# Patient Record
Sex: FEMALE | Race: WHITE | NOT HISPANIC OR LATINO | Employment: OTHER | URBAN - METROPOLITAN AREA
[De-identification: names, ages, dates, MRNs, and addresses within clinical notes are randomized per-mention and may not be internally consistent; named-entity substitution may affect disease eponyms.]

---

## 2017-01-05 ENCOUNTER — ANTI-COAG VISIT (OUTPATIENT)
Dept: CARDIOLOGY | Facility: CLINIC | Age: 72
End: 2017-01-05
Payer: MEDICARE

## 2017-01-05 DIAGNOSIS — Z79.01 LONG TERM CURRENT USE OF ANTICOAGULANT THERAPY: Primary | ICD-10-CM

## 2017-01-05 DIAGNOSIS — I48.91 ATRIAL FIBRILLATION, UNSPECIFIED TYPE: ICD-10-CM

## 2017-01-05 LAB
CTP QC/QA: NORMAL
INR PPP: 2.2 (ref 2–3)

## 2017-01-05 PROCEDURE — 85610 PROTHROMBIN TIME: CPT | Mod: QW,S$GLB,,

## 2017-01-05 NOTE — PROGRESS NOTES
INR in range on adjusted dose. No changes/problems reported. Will maintain and recheck in 3 weeks.

## 2017-01-05 NOTE — MR AVS SNAPSHOT
[facility]  [address]  [place] [address]  Phone: [phone]                  Eva Corona   2017 2:00 PM   Anti-coag visit    Description:  Female : 1945   Provider:  Betsy Quesada, KenD   Department:  [facility]           Diagnoses this Visit        Comments    Long term current use of anticoagulant therapy    -  Primary     Atrial fibrillation, unspecified type                To Do List           Future Appointments        Provider Department Dept Phone    2017 3:00 PM JAYY REYES 2 [place] [facility] [phone]    2017 3:00 PM [facility] [phone]    2017 3:00 PM Beronica Ivory, PharmD [facility] [phone]      Goals (5 Years of Data)     None      [facility]     [facility] -  Assistance  Registered nurses in the [facility] provide clinical advisement, health education, appointment booking, and other advisory services.  Call for this free service at [phone].             Medications           Message regarding Medications     Verify the changes and/or additions to your medication regime listed below are the same as discussed with your clinician today.  If any of these changes or additions are incorrect, please notify your healthcare provider.             Verify that the below list of medications is an accurate representation of the medications you are currently taking.  If none reported, the list may be blank. If incorrect, please contact your healthcare provider. Carry this list with you in case of emergency.           Current Medications     acetaminophen (TYLENOL) 325 MG tablet Take 2 tablets (650 mg total) by mouth every 6 (six) hours as needed for Pain.    amlodipine (NORVASC) 5 MG tablet Take 1 tablet (5 mg total) by mouth once daily.    amoxicillin (AMOXIL) 500 MG Tab Take 1 tablet (500 mg total) by mouth every 12  (twelve) hours.    ascorbic acid (VITAMIN C) 1000 MG tablet Take 1,000 mg by mouth once daily.    atorvastatin (LIPITOR) 10 MG tablet Take 10 mg by mouth once daily.    COCONUT OIL ORAL Take by mouth.    fluticasone (FLONASE) 50 mcg/actuation nasal spray 2 sprays by Each Nare route every evening.    furosemide (LASIX) 40 MG tablet Take 40 mg by mouth 2 (two) times daily.    gabapentin (NEURONTIN) 300 MG capsule Take 600 mg by mouth 3 (three) times daily.    glimepiride (AMARYL) 4 MG tablet Take 2 tablets (8 mg total) by mouth daily with breakfast.    lansoprazole (PREVACID SOLUTAB) 30 MG disintegrating tablet Take 30 mg by mouth once daily.    levothyroxine (SYNTHROID) 75 MCG tablet TAKE 1 TABLET BY MOUTH ONCE A DAY BEFORE BREAKFAST    metformin (GLUCOPHAGE) 500 MG tablet Take 1 tablet (500 mg total) by mouth 2 (two) times daily with meals.    metoprolol succinate (TOPROL-XL) 100 MG 24 hr tablet Take 1 tablet (100 mg total) by mouth once daily.    MULTIVIT-MIN/FA/CA CARB/VIT K (ONE-A-DAY WOMEN'S 50+ ORAL) Take 1 tablet by mouth once daily.     potassium chloride SA (K-DUR,KLOR-CON) 20 MEQ tablet Take 1 tablet (20 mEq total) by mouth once daily.    VITAMIN A ORAL Take 1 tablet by mouth once daily.     warfarin (COUMADIN) 5 MG tablet 1.5 tablets (7.5 mg) PO on Mon, Wed, Fri.  1 tablet PO (5 mg) all other days.           Clinical Reference Information           Vital Signs - Last Recorded     LMP                   (LMP Unknown)           Allergies as of 1/5/2017     Codeine    Neuromuscular Blockers, Steroidal    Morphine      Immunizations Administered on Date of Encounter - 1/5/2017     None      Orders Placed During Today's Visit      Normal Orders This Visit    ISTAT INR          1/5/2017  2:09 PM - Betsy Quesada PharmD      Component Results     Component Value Flag Ref Range Units Status    INR 2.2  2.0 - 3.0  Final     Acceptable           December 2016 Details    Sun Mon Tue Wed Thu Fri Sat          1               2               3                 4               5               6      5 mg         7      7.5 mg         8      5 mg         9      7.5 mg         10      5 mg           11      5 mg         12      7.5 mg         13   2.3   5 mg   See details      14      7.5 mg         15      5 mg         16      7.5 mg         17      5 mg           18      5 mg         19      7.5 mg         20      5 mg         21      7.5 mg         22      5 mg         23      7.5 mg         24      5 mg           25      5 mg         26      7.5 mg         27      5 mg         28      7.5 mg         29      5 mg         30      7.5 mg         31      5 mg          Date Details   12/13 Last INR check   INR: 2.3                 January 2017 Details    Sun Mon Tue Wed Thu Fri Sat     1      5 mg         2      7.5 mg         3      5 mg         4      7.5 mg         5   2.2   5 mg   See details      6      7.5 mg         7      5 mg           8      5 mg         9      7.5 mg         10      5 mg         11      7.5 mg         12      5 mg         13      7.5 mg         14      5 mg           15      5 mg         16      7.5 mg         17      5 mg         18      7.5 mg         19      5 mg         20      7.5 mg         21      5 mg           22      5 mg         23      7.5 mg         24      5 mg         25      7.5 mg         26      5 mg         27      7.5 mg         28      5 mg           29      5 mg         30      7.5 mg         31                 Date Details   01/05 This INR check   INR: 2.2       Date of next INR:  1/31/2017               How to take your warfarin dose     To take:  5 mg Take 1 of the 5 mg tablets.    To take:  7.5 mg Take 1.5 of the 5 mg tablets.           Anticoagulation Summary as of 1/5/2017     Maintenance plan 7.5 mg (5 mg x 1.5) on Mon, Wed, Fri; 5 mg (5 mg x 1) all other days    Full instructions 7.5 mg on Mon, Wed, Fri; 5 mg all other days    Next INR check 1/31/2017       Anticoagulation Episode Summary     Comments [facility]

## 2017-01-11 ENCOUNTER — DOCUMENTATION ONLY (OUTPATIENT)
Dept: FAMILY MEDICINE | Facility: CLINIC | Age: 72
End: 2017-01-11

## 2017-01-11 NOTE — PROGRESS NOTES
Pre-Visit Chart Review  For Appointment Scheduled on 01/12/2017    Health Maintenance Due   Topic Date Due    Foot Exam  09/04/1955    TETANUS VACCINE  09/04/1963    Pneumococcal (65+) (1 of 2 - PCV13) 09/04/2010    Eye Exam  05/19/2015    Mammogram  03/03/2016    Influenza Vaccine  08/01/2016    Urine Microalbumin  10/12/2016

## 2017-01-26 ENCOUNTER — OFFICE VISIT (OUTPATIENT)
Dept: CARDIOLOGY | Facility: CLINIC | Age: 72
End: 2017-01-26
Payer: MEDICARE

## 2017-01-26 VITALS
SYSTOLIC BLOOD PRESSURE: 155 MMHG | OXYGEN SATURATION: 94 % | BODY MASS INDEX: 31.47 KG/M2 | DIASTOLIC BLOOD PRESSURE: 67 MMHG | HEART RATE: 73 BPM | HEIGHT: 64 IN | WEIGHT: 184.31 LBS

## 2017-01-26 DIAGNOSIS — Z86.79 PERSONAL HISTORY OF ATRIAL FIBRILLATION: ICD-10-CM

## 2017-01-26 DIAGNOSIS — E78.2 MIXED HYPERLIPIDEMIA: ICD-10-CM

## 2017-01-26 DIAGNOSIS — I42.9 CARDIOMYOPATHY, UNSPECIFIED TYPE: ICD-10-CM

## 2017-01-26 DIAGNOSIS — Z86.79 S/P ABLATION OF ATRIAL FIBRILLATION: ICD-10-CM

## 2017-01-26 DIAGNOSIS — Z98.890 S/P ABLATION OF ATRIAL FIBRILLATION: ICD-10-CM

## 2017-01-26 DIAGNOSIS — E66.9 OBESITY, CLASS I, BMI 30-34.9: ICD-10-CM

## 2017-01-26 DIAGNOSIS — I48.19 PERSISTENT ATRIAL FIBRILLATION: ICD-10-CM

## 2017-01-26 DIAGNOSIS — I50.33 ACUTE ON CHRONIC DIASTOLIC HEART FAILURE: ICD-10-CM

## 2017-01-26 DIAGNOSIS — I50.22 CHRONIC SYSTOLIC CONGESTIVE HEART FAILURE: ICD-10-CM

## 2017-01-26 DIAGNOSIS — I48.0 PAROXYSMAL ATRIAL FIBRILLATION: ICD-10-CM

## 2017-01-26 DIAGNOSIS — I10 ESSENTIAL HYPERTENSION: Primary | ICD-10-CM

## 2017-01-26 DIAGNOSIS — Z79.01 LONG TERM CURRENT USE OF ANTICOAGULANT THERAPY: ICD-10-CM

## 2017-01-26 PROCEDURE — 3077F SYST BP >= 140 MM HG: CPT | Mod: S$GLB,,, | Performed by: INTERNAL MEDICINE

## 2017-01-26 PROCEDURE — 99214 OFFICE O/P EST MOD 30 MIN: CPT | Mod: S$GLB,,, | Performed by: INTERNAL MEDICINE

## 2017-01-26 PROCEDURE — 3078F DIAST BP <80 MM HG: CPT | Mod: S$GLB,,, | Performed by: INTERNAL MEDICINE

## 2017-01-26 PROCEDURE — 1157F ADVNC CARE PLAN IN RCRD: CPT | Mod: S$GLB,,, | Performed by: INTERNAL MEDICINE

## 2017-01-26 PROCEDURE — 99499 UNLISTED E&M SERVICE: CPT | Mod: S$GLB,,, | Performed by: INTERNAL MEDICINE

## 2017-01-26 PROCEDURE — 1159F MED LIST DOCD IN RCRD: CPT | Mod: S$GLB,,, | Performed by: INTERNAL MEDICINE

## 2017-01-26 PROCEDURE — 1160F RVW MEDS BY RX/DR IN RCRD: CPT | Mod: S$GLB,,, | Performed by: INTERNAL MEDICINE

## 2017-01-26 PROCEDURE — 1126F AMNT PAIN NOTED NONE PRSNT: CPT | Mod: S$GLB,,, | Performed by: INTERNAL MEDICINE

## 2017-01-26 PROCEDURE — 99999 PR PBB SHADOW E&M-EST. PATIENT-LVL III: CPT | Mod: PBBFAC,,, | Performed by: INTERNAL MEDICINE

## 2017-01-26 NOTE — MR AVS SNAPSHOT
[facility] - Cardiology  [address]  [place] [address]  Phone: [phone]                  Eva Corona   2017 3:00 PM   Office Visit    Description:  Female : 1945   Provider:  Justus Cleveland MD   Department:  [facility]           Reason for Visit     Hypertension           Diagnoses this Visit        Comments    Essential hypertension    -  Primary     Acute on chronic diastolic heart failure         Cardiomyopathy, unspecified type         Chronic systolic congestive heart failure         Persistent atrial fibrillation                To Do List           Future Appointments        Provider Department Dept Phone    2017 3:00 PM Beronica Ivory, PharmD [facility] - Coumadin [phone]    2017 3:00 PM [facility] [phone]      Goals (5 Years of Data)     None      Follow-Up and Disposition     Return in about 3 months (around 2017).      [facility]     [facility] On Call Nurse Care Line -  Assistance  Registered nurses in the [facility] provide clinical advisement, health education, appointment booking, and other advisory services.  Call for this free service at [phone].             Medications           Message regarding Medications     Verify the changes and/or additions to your medication regime listed below are the same as discussed with your clinician today.  If any of these changes or additions are incorrect, please notify your healthcare provider.             Verify that the below list of medications is an accurate representation of the medications you are currently taking.  If none reported, the list may be blank. If incorrect, please contact your healthcare provider. Carry this list with you in case of emergency.           Current Medications     acetaminophen (TYLENOL) 325 MG tablet Take 2 tablets (650 mg total) by mouth every 6 (six) hours as needed for Pain.     "amlodipine (NORVASC) 5 MG tablet Take 1 tablet (5 mg total) by mouth once daily.    ascorbic acid (VITAMIN C) 1000 MG tablet Take 1,000 mg by mouth once daily.    atorvastatin (LIPITOR) 10 MG tablet Take 10 mg by mouth once daily.    COCONUT OIL ORAL Take by mouth.    fluticasone (FLONASE) 50 mcg/actuation nasal spray 2 sprays by Each Nare route every evening.    furosemide (LASIX) 40 MG tablet Take 40 mg by mouth 2 (two) times daily.    gabapentin (NEURONTIN) 300 MG capsule Take 600 mg by mouth 3 (three) times daily.    glimepiride (AMARYL) 4 MG tablet Take 2 tablets (8 mg total) by mouth daily with breakfast.    lansoprazole (PREVACID SOLUTAB) 30 MG disintegrating tablet Take 30 mg by mouth once daily.    levothyroxine (SYNTHROID) 75 MCG tablet TAKE 1 TABLET BY MOUTH ONCE A DAY BEFORE BREAKFAST    metformin (GLUCOPHAGE) 500 MG tablet Take 1 tablet (500 mg total) by mouth 2 (two) times daily with meals.    metoprolol succinate (TOPROL-XL) 100 MG 24 hr tablet Take 1 tablet (100 mg total) by mouth once daily.    MULTIVIT-MIN/FA/CA CARB/VIT K (ONE-A-DAY WOMEN'S 50+ ORAL) Take 1 tablet by mouth once daily.     potassium chloride SA (K-DUR,KLOR-CON) 20 MEQ tablet Take 1 tablet (20 mEq total) by mouth once daily.    VITAMIN A ORAL Take 1 tablet by mouth once daily.     warfarin (COUMADIN) 5 MG tablet 1.5 tablets (7.5 mg) PO on Mon, Wed, Fri.  1 tablet PO (5 mg) all other days.           Clinical Reference Information           Vital Signs - Last Recorded  Most recent update: 1/26/2017  3:50 PM by Brooke Velázquez MA    BP Pulse Ht Wt LMP SpO2    (!) 155/67 (BP Location: Left arm) 73 5' 4" (1.626 m) 83.6 kg (184 lb 4.9 oz) (LMP Unknown) (!) 94%    BMI                31.64 kg/m2          Blood Pressure          Most Recent Value    Right Arm BP - Sitting  153/68    Left Arm BP - Sitting  155/67    BP  (!)  155/67      Allergies as of 1/26/2017     Codeine    Neuromuscular Blockers, Steroidal    Morphine      Immunizations " Administered on Date of Encounter - 1/26/2017     None      Instructions    Continue current medication regimen  Follow up in 3 months

## 2017-01-26 NOTE — PROGRESS NOTES
[facility]    CC: Follow up    Patient ID: Eva Corona is a 71 y.o. female with a past medical history of DM2, Afib s/p ablation (on coumadin), Non-ischemic cardiomyopathy (EF 35-40%), HTN, HLD, who presents for a follow up appointment.  Pertinent history/events include:      -She was unable to tolerate the increase in Toprol XL to 100 mg daily due to feeling weak and tired.  She went back to 50 mg daily, and now feels better on this dosage.  -Increased Toprol XL to 100 mg daily on 11/10/2016 for improved blood pressure control.  -Underwent Afib ablation on 6/18/2015  -Underwent cardiac catherization on 6/3/2015: RHC showed increased right heart pressures: RV:60/14, PAP:63/25. Coronary angiogram revealed normal coronaries and normal LV function.  -Echo from 11/30/2015 shows EF:35-40% (unchanged from 6/19/2015); Mildly to moderately enlarged aortic root.   -Admitted with PAF with RVR and CHF on 1/30/14- underwent successful electrical cardioversion and was placed on sotalol.    Last clinic visit 12/9/2016:  Assessment/Plan:   Eva Corona is a 71 y.o. female with a past medical history of DM2, Afib s/p ablation (on coumadin), Non-ischemic cardiomyopathy (EF 35-40%), HTN, HLD, who presents for a follow up.  Blood pressure remains elevated.   Unable to tolerate Toprol  mg daily.    1. HTN- Not currently at goal.  Increase Norvasc to 10 mg daily.  Continue Toprol XL 50 mg daily.  Pt 's blood pressure has been difficult to control for several years per chart review.  Will continue to make small changes to her regimen as she tolerates them.   Pt instructed to eat a low salt diet as well.     2. Non-ischemic Cardiomyopathy-  Asymptomatic at this time.  Continue to monitor.    3. PAF s/p ablation- Continue anticoagulation with coumadin.      4. HLD- Continue atorvastatin 10 mg daily.        HPI  Today, Ms. Corona reports doing well.  Recently treated for bronchitis, which is improving.  States blood  pressures at PCP's office and home have been in 130's systolic.  Reports no chest pain, significant SOB, LE edema, palpitations, syncope or TIA symptoms.  States she is eating a low salt diet now and has lost a little weight.  /68 today.  Taking all medications as prescribed with no issues.      Past Medical History   Diagnosis Date    Anticoagulant long-term use     Atrial fibrillation     Cataract     CHF (congestive heart failure)     Diabetes mellitus     Encounter for blood transfusion     Hyperlipidemia     Hypertension     On home oxygen therapy      2L, nightly    Shortness of breath on exertion     Thyroid disease      Past Surgical History   Procedure Laterality Date    Cardiac catheterization Right     Colonoscopy      Cardiac electrophysiology mapping and ablation      Eye surgery Bilateral      cataract, implants    Hysterectomy       TVH, ovaries intact, benign      Review of patient's allergies indicates:   Allergen Reactions    Codeine Nausea And Vomiting    Neuromuscular blockers, steroidal      Other reaction(s): Unknown    Morphine Nausea And Vomiting       Current Outpatient Prescriptions:     acetaminophen (TYLENOL) 325 MG tablet, Take 2 tablets (650 mg total) by mouth every 6 (six) hours as needed for Pain., Disp: , Rfl: 0    amlodipine (NORVASC) 5 MG tablet, Take 1 tablet (5 mg total) by mouth once daily., Disp: 90 tablet, Rfl: 3    ascorbic acid (VITAMIN C) 1000 MG tablet, Take 1,000 mg by mouth once daily., Disp: , Rfl:     atorvastatin (LIPITOR) 10 MG tablet, Take 10 mg by mouth once daily., Disp: , Rfl:     COCONUT OIL ORAL, Take by mouth., Disp: , Rfl:     fluticasone (FLONASE) 50 mcg/actuation nasal spray, 2 sprays by Each Nare route every evening., Disp: 3 Bottle, Rfl: 3    furosemide (LASIX) 40 MG tablet, Take 40 mg by mouth 2 (two) times daily., Disp: , Rfl:     gabapentin (NEURONTIN) 300 MG capsule, Take 600 mg by mouth 3 (three) times daily., Disp: ,  Rfl:     glimepiride (AMARYL) 4 MG tablet, Take 2 tablets (8 mg total) by mouth daily with breakfast., Disp: 180 tablet, Rfl: 3    lansoprazole (PREVACID SOLUTAB) 30 MG disintegrating tablet, Take 30 mg by mouth once daily., Disp: , Rfl:     levothyroxine (SYNTHROID) 75 MCG tablet, TAKE 1 TABLET BY MOUTH ONCE A DAY BEFORE BREAKFAST, Disp: 90 tablet, Rfl: 3    metformin (GLUCOPHAGE) 500 MG tablet, Take 1 tablet (500 mg total) by mouth 2 (two) times daily with meals., Disp: 180 tablet, Rfl: 3    metoprolol succinate (TOPROL-XL) 100 MG 24 hr tablet, Take 1 tablet (100 mg total) by mouth once daily. (Patient taking differently: Take 100 mg by mouth once daily. Take 1/2 tablet PO daily), Disp: 30 tablet, Rfl: 11    MULTIVIT-MIN/FA/CA CARB/VIT K (ONE-A-DAY WOMEN'S 50+ ORAL), Take 1 tablet by mouth once daily. , Disp: , Rfl:     potassium chloride SA (K-DUR,KLOR-CON) 20 MEQ tablet, Take 1 tablet (20 mEq total) by mouth once daily., Disp: 30 tablet, Rfl: 11    VITAMIN A ORAL, Take 1 tablet by mouth once daily. , Disp: , Rfl:     warfarin (COUMADIN) 5 MG tablet, 1.5 tablets (7.5 mg) PO on Mon, Wed, Fri.  1 tablet PO (5 mg) all other days., Disp: 135 tablet, Rfl: 3      Social History     Social History    Marital status:      Spouse name: N/A    Number of children: N/A    Years of education: N/A     Occupational History    Not on file.     Social History Main Topics    Smoking status: Never Smoker    Smokeless tobacco: Never Used    Alcohol use No    Drug use: No    Sexual activity: No     Other Topics Concern    Not on file     Social History Narrative     Family History   Problem Relation Age of Onset    Diabetes Mother     Cancer Neg Hx          Review of Systems  Constitution: Denies chills, fever, and sweats.  HENT: Denies headaches or blurry vision.  Cardiovascular: Denies chest pain or irregular heart beat.  Respiratory: Denies cough or shortness of breath.  Gastrointestinal: Denies  "abdominal pain, nausea, or vomiting.  Musculoskeletal: Denies muscle cramps.  Neurological: Denies dizziness or focal weakness.  Psychiatric/Behavioral: Normal mental status.  Hematologic/Lymphatic: Denies bleeding problem or easy bruising/bleeding.  Skin: Denies rash or suspicious lesions    Physical Examination  Visit Vitals    BP (!) 155/67 (BP Location: Left arm)    Pulse 73    Ht 5' 4" (1.626 m)    Wt 83.6 kg (184 lb 4.9 oz)    LMP  (LMP Unknown)    SpO2 (!) 94%    BMI 31.64 kg/m2       Constitutional: No acute distress, conversant  HEENT: Sclera anicteric, Pupils equal, round and reactive to light, extraocular motions intact, Oropharynx clear  Neck: No JVD, no carotid bruits  Cardiovascular: regular rate and rhythm, no murmur, rubs or gallops, normal S1/S2  Pulmonary: Clear to auscultation bilaterally  Abdominal: Abdomen soft, nontender, nondistended, positive bowel sounds  Extremities: No lower extremity edema,   Pulses:  Carotid pulses are 2+ on the right side, and 2+ on the left side.  Radial pulses are 2+ on the right side, and 2+ on the left side.   Femoral pulses are 2+ on the right side, and 2+ on the left side.  Skin: No ecchymosis, erythema, or ulcers  Psych: Alert and oriented x 3, appropriate affect  Neuro: CNII-XII intact, no focal deficits    Labs:  Results for MUMTAZ MONTALVO (MRN [id number]) as of 12/9/2016 17:27   Ref. Range 12/2/2016 09:38   Cholesterol Latest Ref Range: 120 - 199 mg/dL 112 (L)   HDL Latest Ref Range: 40 - 75 mg/dL 26 (L)   LDL Cholesterol Latest Ref Range: 63.0 - 159.0 mg/dL 57.4 (L)   Total Cholesterol/HDL Ratio Latest Ref Range: 2.0 - 5.0  4.3   Triglycerides Latest Ref Range: 30 - 150 mg/dL 143       Echo 11/30/2015:  CONCLUSIONS     1 - Mildly to moderately enlarged aortic root.     2 - Mild left atrial enlargement.     3 - Mild left ventricular enlargement.     4 - Eccentric hypertrophy.     5 - Moderately depressed left ventricular systolic function (EF 35-40%). "     6 - Low normal to mildly depressed right ventricular systolic function .     7 - No significant change from Echo on 6/19/2015.     Assessment/Plan:   Eva Corona is a 71 y.o. female with a past medical history of DM2, Afib s/p ablation (on coumadin), Non-ischemic cardiomyopathy (EF 35-40%), HTN, HLD, who presents for a follow up.  Blood pressure remains elevated.   Unable to tolerate Toprol  mg daily.    1. HTN- Pt's blood pressures better at home.  She has not been able to tolerate small changes to her regimen.  Will continue current regimen and monitor her home blood pressure readings.  Continue a low salt diet and weight loss.       2. Non-ischemic Cardiomyopathy-  Asymptomatic at this time.  Continue to monitor.    3. PAF s/p ablation- Continue anticoagulation with coumadin.      4. HLD- Continue atorvastatin 10 mg daily.      Follow up in 3 months    Total duration of face to face visit time 45 minutes.  Total time spent counseling greater than fifty percent of total visit time.  Counseling included discussion regarding imaging findings, diagnosis, possibilities, treatment options, risks and benefits.      Justus Cleveland MD, PhD  Interventional Cardiology

## 2017-01-31 NOTE — PROGRESS NOTES
Pt called to resched appt to 2/7--sick with bronchitis; taking doxycycline 100mg BID x 7 days (started yesterday)

## 2017-02-07 ENCOUNTER — ANTI-COAG VISIT (OUTPATIENT)
Dept: CARDIOLOGY | Facility: CLINIC | Age: 72
End: 2017-02-07
Payer: MEDICARE

## 2017-02-07 DIAGNOSIS — I48.91 ATRIAL FIBRILLATION, UNSPECIFIED TYPE: ICD-10-CM

## 2017-02-07 DIAGNOSIS — Z79.01 LONG TERM CURRENT USE OF ANTICOAGULANT THERAPY: Primary | ICD-10-CM

## 2017-02-07 LAB — INR PPP: 1.7 (ref 2–3)

## 2017-02-07 PROCEDURE — 85610 PROTHROMBIN TIME: CPT | Mod: QW,S$GLB,, | Performed by: PHARMACIST

## 2017-02-07 PROCEDURE — 99211 OFF/OP EST MAY X REQ PHY/QHP: CPT | Mod: 25,S$GLB,, | Performed by: PHARMACIST

## 2017-02-07 NOTE — PROGRESS NOTES
"INR subtherapeutic, Pt has another refill of doxy x 7 days, started today.  INR is subtherapeutic with the lowered dose for potential DDI.  Will adjust dose a bit higher for the refill of doxy which is for 7 days.  Pt will then resume her routine dose and recheck in 1 month  "This note will not be shared with the patient."  "

## 2017-02-07 NOTE — MR AVS SNAPSHOT
[facility]  [address]  [place] [address]  Phone: [phone]                  Eva Corona   2017 2:15 PM   Anti-coag visit    Description:  Female : 1945   Provider:  Beronica Ivory PharmD   Department:  [place] [facility] Cou[facility]           Diagnoses this Visit        Comments    Long term current use of anticoagulant therapy                To Do List           Future Appointments        Provider Department Dept Phone    3/7/2017 2:30 PM Fadi Gomezll [facility] - Coumadin [phone]    2017 3:00 PM Justus Cleveland MD [facility] Cardiology [phone]      Goals (5 Years of Data)     None      [facility]     [facility] On Call Nurse [facility] Line -  Assistance  Registered nurses in the [facility] On Call Center provide clinical advisement, health education, appointment booking, and other advisory services.  Call for this free service at [phone].             Medications           Message regarding Medications     Verify the changes and/or additions to your medication regime listed below are the same as discussed with your clinician today.  If any of these changes or additions are incorrect, please notify your healthcare provider.             Verify that the below list of medications is an accurate representation of the medications you are currently taking.  If none reported, the list may be blank. If incorrect, please contact your healthcare provider. Carry this list with you in case of emergency.           Current Medications     acetaminophen (TYLENOL) 325 MG tablet Take 2 tablets (650 mg total) by mouth every 6 (six) hours as needed for Pain.    amlodipine (NORVASC) 5 MG tablet Take 1 tablet (5 mg total) by mouth once daily.    ascorbic acid (VITAMIN C) 1000 MG tablet Take 1,000 mg by mouth once daily.    atorvastatin (LIPITOR) 10 MG tablet Take 10 mg by mouth once daily.    COCONUT OIL ORAL Take by mouth.    fluticasone (FLONASE)  50 mcg/actuation nasal spray 2 sprays by Each Nare route every evening.    furosemide (LASIX) 40 MG tablet Take 40 mg by mouth 2 (two) times daily.    gabapentin (NEURONTIN) 300 MG capsule Take 600 mg by mouth 3 (three) times daily.    glimepiride (AMARYL) 4 MG tablet Take 2 tablets (8 mg total) by mouth daily with breakfast.    lansoprazole (PREVACID SOLUTAB) 30 MG disintegrating tablet Take 30 mg by mouth once daily.    levothyroxine (SYNTHROID) 75 MCG tablet TAKE 1 TABLET BY MOUTH ONCE A DAY BEFORE BREAKFAST    metformin (GLUCOPHAGE) 500 MG tablet Take 1 tablet (500 mg total) by mouth 2 (two) times daily with meals.    metoprolol succinate (TOPROL-XL) 100 MG 24 hr tablet Take 1 tablet (100 mg total) by mouth once daily.    MULTIVIT-MIN/FA/CA CARB/VIT K (ONE-A-DAY WOMEN'S 50+ ORAL) Take 1 tablet by mouth once daily.     potassium chloride SA (K-DUR,KLOR-CON) 20 MEQ tablet Take 1 tablet (20 mEq total) by mouth once daily.    VITAMIN A ORAL Take 1 tablet by mouth once daily.     warfarin (COUMADIN) 5 MG tablet 1.5 tablets (7.5 mg) PO on Mon, Wed, Fri.  1 tablet PO (5 mg) all other days.           Clinical Reference Information           Your Vitals Were     Last Period                   (LMP Unknown)           Allergies as of 2/7/2017     Codeine    Neuromuscular Blockers, Steroidal    Morphine      Immunizations Administered on Date of Encounter - 2/7/2017     None      January 2017 Details    Sun Mon Tue Wed Thu Fri Sat     1               2               3               4               5   2.2   5 mg   See details      6      7.5 mg         7      5 mg           8      5 mg         9      7.5 mg         10      5 mg         11      7.5 mg         12      5 mg         13      7.5 mg         14      5 mg           15      5 mg         16      7.5 mg         17      5 mg         18      7.5 mg         19      5 mg         20      7.5 mg         21      5 mg           22      5 mg         23      7.5 mg         24       5 mg         25      7.5 mg         26      5 mg         27      7.5 mg         28      5 mg           29      5 mg         30      7.5 mg         31      5 mg              Date Details   01/05 Last INR check   INR: 2.2                 February 2017 Details    Sun Mon Tue Wed Thu Fri Sat        1      2.5 mg         2      5 mg         3      5 mg         4      5 mg           5      5 mg         6      2.5 mg         7      5 mg   See details      8      5 mg         9      5 mg         10      5 mg         11      5 mg           12      5 mg         13      5 mg         14      5 mg         15      7.5 mg         16      5 mg         17      7.5 mg         18      5 mg           19      5 mg         20      7.5 mg         21      5 mg         22      7.5 mg         23      5 mg         24      7.5 mg         25      5 mg           26      5 mg         27      7.5 mg         28      5 mg              Date Details   02/07 This INR check               How to take your warfarin dose     To take:  5 mg Take 1 of the 5 mg tablets.    To take:  7.5 mg Take 1.5 of the 5 mg tablets.           March 2017 Details    Sun Mon Tue Wed Thu Fri Sat        1      7.5 mg         2      5 mg         3      7.5 mg         4      5 mg           5      5 mg         6      7.5 mg         7            8               9               10               11                 12               13               14               15               16               17               18                 19               20               21               22               23               24               25                 26               27               28               29               30               31                 Date Details   No additional details    Date of next INR:  3/7/2017         How to take your warfarin dose     To take:  5 mg Take 1 of the 5 mg tablets.    To take:  7.5 mg Take 1.5 of the 5 mg tablets.           Anticoagulation  Summary as of 2/7/2017     Maintenance plan 7.5 mg (5 mg x 1.5) on Mon, Wed, Fri; 5 mg (5 mg x 1) all other days    Full instructions 2/8: 5 mg; 2/10: 5 mg; 2/13: 5 mg; Otherwise 7.5 mg on Mon, Wed, Fri; 5 mg all other days    Next INR check 3/7/2017      Anticoagulation Episode Summary     Comments [facility]      Patient Findings     Positives Change in medications    Negatives Signs/symptoms of thrombosis, Signs/symptoms of bleeding, Laboratory test error suspected, Change in health, Change in alcohol use, Change in activity, Upcoming invasive procedure, Emergency department visit, Upcoming dental procedure, Missed doses, Extra doses, Change in diet/appetite, Hospital admission, Bruising, Other complaints    Comments Pt has another refill of doxy x 7 days, started today      Language Assistance Services     ATTENTION: Language assistance services are available, free of charge. Please call [phone].      ATENCIÓN: Si habla kimberly, tiene a galdamez disposición servicios gratuitos de asistencia lingüística. Llame al [phone].     [facility] Ý: N?u b?n nói Ti?ng Vi?t, có các d?ch v? h? tr? ngôn ng? mi?n phí dành cho b?n. G?i s? [phone].         [facility] [facility] complies with applicable Federal civil rights laws and does not discriminate on the basis of race, color, national origin, age, disability, or sex.

## 2017-02-23 RX ORDER — FUROSEMIDE 40 MG/1
TABLET ORAL
Qty: 180 TABLET | Refills: 3 | Status: SHIPPED | OUTPATIENT
Start: 2017-02-23 | End: 2017-07-25

## 2017-02-23 RX ORDER — ATORVASTATIN CALCIUM 10 MG/1
TABLET, FILM COATED ORAL
Qty: 90 TABLET | Refills: 3 | Status: SHIPPED | OUTPATIENT
Start: 2017-02-23 | End: 2018-03-21 | Stop reason: SDUPTHER

## 2017-02-23 RX ORDER — GABAPENTIN 300 MG/1
CAPSULE ORAL
Qty: 540 CAPSULE | Refills: 3 | Status: SHIPPED | OUTPATIENT
Start: 2017-02-23 | End: 2017-09-28 | Stop reason: DRUGHIGH

## 2017-02-23 RX ORDER — AMLODIPINE BESYLATE 5 MG/1
TABLET ORAL
Qty: 90 TABLET | Refills: 3 | Status: SHIPPED | OUTPATIENT
Start: 2017-02-23 | End: 2017-11-14 | Stop reason: ALTCHOICE

## 2017-03-07 ENCOUNTER — ANTI-COAG VISIT (OUTPATIENT)
Dept: CARDIOLOGY | Facility: CLINIC | Age: 72
End: 2017-03-07
Payer: MEDICARE

## 2017-03-07 DIAGNOSIS — I48.91 ATRIAL FIBRILLATION, UNSPECIFIED TYPE: ICD-10-CM

## 2017-03-07 DIAGNOSIS — Z79.01 LONG TERM CURRENT USE OF ANTICOAGULANT THERAPY: Primary | ICD-10-CM

## 2017-03-07 LAB — INR PPP: 2.1 (ref 2–3)

## 2017-03-07 PROCEDURE — 85610 PROTHROMBIN TIME: CPT | Mod: QW,S$GLB,, | Performed by: PHARMACIST

## 2017-03-07 NOTE — PROGRESS NOTES
INR in range.  Pt denies any changes in meds/diet/health.  Will continue dose and recheck in 5 weeks

## 2017-04-11 ENCOUNTER — ANTI-COAG VISIT (OUTPATIENT)
Dept: CARDIOLOGY | Facility: CLINIC | Age: 72
End: 2017-04-11
Payer: MEDICARE

## 2017-04-11 DIAGNOSIS — Z79.01 LONG TERM CURRENT USE OF ANTICOAGULANT THERAPY: Primary | ICD-10-CM

## 2017-04-11 DIAGNOSIS — I48.91 ATRIAL FIBRILLATION, UNSPECIFIED TYPE: ICD-10-CM

## 2017-04-11 LAB — INR PPP: 2.4 (ref 2–3)

## 2017-04-11 PROCEDURE — 85610 PROTHROMBIN TIME: CPT | Mod: QW,S$GLB,, | Performed by: PHARMACIST

## 2017-04-11 NOTE — PROGRESS NOTES
"INR in range.  Pt denies any changes in meds/diet/health.  Will continue dose and recheck in 4 weeks.  She is t o see Dr Cleveland 4/27, will call with changes.   She plans to inquire about a different anticoagulant. "This note will not be shared with the patient."  "

## 2017-04-11 NOTE — MR AVS SNAPSHOT
[place] [facility]  [address]  [place] [address]  Phone: [phone]                  Eva Corona   2017 2:30 PM   Anti-coag visit    Description:  Female : 1945   Provider:  Beronica Ivory, PharmD   Department:  [place] [facility]           Diagnoses this Visit        Comments    Long term current use of anticoagulant therapy    -  Primary     Atrial fibrillation, unspecified type                To Do List           Future Appointments        Provider Department Dept Phone    2017 3:30 PM [facility] [phone]    2017 2:30 PM Beronica Ivory, Fadi Salcedoll [facility] - Coumadin [phone]      Goals (5 Years of Data)     None      [facility]     [facility] On Call Nurse Care Line -  Assistance  Unless otherwise directed by your provider, please contact [facility], our nurse care line that is available for  assistance.     Registered nurses in the [facility] Center provide: appointment scheduling, clinical advisement, health education, and other advisory services.  Call: [phone] (toll free)               Medications           Message regarding Medications     Verify the changes and/or additions to your medication regime listed below are the same as discussed with your clinician today.  If any of these changes or additions are incorrect, please notify your healthcare provider.             Verify that the below list of medications is an accurate representation of the medications you are currently taking.  If none reported, the list may be blank. If incorrect, please contact your healthcare provider. Carry this list with you in case of emergency.           Current Medications     acetaminophen (TYLENOL) 325 MG tablet Take 2 tablets (650 mg total) by mouth every 6 (six) hours as needed for Pain.    amlodipine (NORVASC) 5 MG tablet TAKE 1 TABLET (5 MG TOTAL) BY MOUTH ONCE DAILY.    ascorbic acid  (VITAMIN C) 1000 MG tablet Take 1,000 mg by mouth once daily.    atorvastatin (LIPITOR) 10 MG tablet TAKE 1 TABLET (10 MG TOTAL) BY MOUTH ONCE DAILY.    COCONUT OIL ORAL Take by mouth.    fluticasone (FLONASE) 50 mcg/actuation nasal spray 2 sprays by Each Nare route every evening.    furosemide (LASIX) 40 MG tablet TAKE 1 TABLET (40 MG TOTAL) BY MOUTH 2 (TWO) TIMES DAILY.    gabapentin (NEURONTIN) 300 MG capsule TAKE 2 CAPSULES (600 MG TOTAL) BY MOUTH 3 (THREE) TIMES DAILY.    glimepiride (AMARYL) 4 MG tablet Take 2 tablets (8 mg total) by mouth daily with breakfast.    lansoprazole (PREVACID SOLUTAB) 30 MG disintegrating tablet Take 30 mg by mouth once daily.    levothyroxine (SYNTHROID) 75 MCG tablet TAKE 1 TABLET BY MOUTH ONCE A DAY BEFORE BREAKFAST    metformin (GLUCOPHAGE) 500 MG tablet Take 1 tablet (500 mg total) by mouth 2 (two) times daily with meals.    metoprolol succinate (TOPROL-XL) 100 MG 24 hr tablet Take 1 tablet (100 mg total) by mouth once daily.    MULTIVIT-MIN/FA/CA CARB/VIT K (ONE-A-DAY WOMEN'S 50+ ORAL) Take 1 tablet by mouth once daily.     potassium chloride SA (K-DUR,KLOR-CON) 20 MEQ tablet Take 1 tablet (20 mEq total) by mouth once daily.    VITAMIN A ORAL Take 1 tablet by mouth once daily.     warfarin (COUMADIN) 5 MG tablet 1.5 tablets (7.5 mg) PO on Mon, Wed, Fri.  1 tablet PO (5 mg) all other days.           Clinical Reference Information           Your Vitals Were     Last Period                   (LMP Unknown)           Allergies as of 4/11/2017     Codeine    Neuromuscular Blockers, Steroidal    Morphine      Immunizations Administered on Date of Encounter - 4/11/2017     None      Orders Placed During Today's Visit      Normal Orders This Visit    POCT INR          4/11/2017  2:56 PM - Beronica Ivory, PharmD      Component Results     Component    INR    2.4      March 2017 Details    Sun Mon Tue Wed Thu Fri Sat        1               2               3               4                  5               6               7   2.1   5 mg   See details      8      7.5 mg         9      5 mg         10      7.5 mg         11      5 mg           12      5 mg         13      7.5 mg         14      5 mg         15      7.5 mg         16      5 mg         17      7.5 mg         18      5 mg           19      5 mg         20      7.5 mg         21      5 mg         22      7.5 mg         23      5 mg         24      7.5 mg         25      5 mg           26      5 mg         27      7.5 mg         28      5 mg         29      7.5 mg         30      5 mg         31      7.5 mg           Date Details   03/07 Last INR check   INR: 2.1                 April 2017 Details    Sun Mon Tue Wed Thu Fri Sat           1      5 mg           2      5 mg         3      7.5 mg         4      5 mg         5      7.5 mg         6      5 mg         7      7.5 mg         8      5 mg           9      5 mg         10      7.5 mg         11   2.4   5 mg   See details      12      7.5 mg         13      5 mg         14      7.5 mg         15      5 mg           16      5 mg         17      7.5 mg         18      5 mg         19      7.5 mg         20      5 mg         21      7.5 mg         22      5 mg           23      5 mg         24      7.5 mg         25      5 mg         26      7.5 mg         27      5 mg         28      7.5 mg         29      5 mg           30      5 mg                Date Details   04/11 This INR check   INR: 2.4                     How to take your warfarin dose     To take:  5 mg Take 1 of the 5 mg tablets.    To take:  7.5 mg Take 1.5 of the 5 mg tablets.           May 2017 Details    Sun Mon Tue Wed Thu Fri Sat      1      7.5 mg         2      5 mg         3      7.5 mg         4      5 mg         5      7.5 mg         6      5 mg           7      5 mg         8      7.5 mg         9            10               11               12               13                 14               15               16                17               18               19               20                 21               22               23               24               25               26               27                 28               29               30               31                   Date Details   No additional details    Date of next INR:  5/9/2017         How to take your warfarin dose     To take:  5 mg Take 1 of the 5 mg tablets.    To take:  7.5 mg Take 1.5 of the 5 mg tablets.           Anticoagulation Summary as of 4/11/2017     Maintenance plan 7.5 mg (5 mg x 1.5) on Mon, Wed, Fri; 5 mg (5 mg x 1) all other days    Full instructions 7.5 mg on Mon, Wed, Fri; 5 mg all other days    Next INR check 5/9/2017      Anticoagulation Episode Summary     Comments [facility]  (po)      Language Assistance Services     ATTENTION: Language assistance services are available, free of charge. Please call [phone].      ATENCIÓN: Si fabiola harris, tiene a galdamez disposición servicios gratuitos de asistencia lingüística. Llame al [phone].     CHÚ Ý: N?u b?n nói Ti?ng Vi?t, có các d?ch v? h? tr? ngôn ng? mi?n phí dành cho b?n. G?i s? [phone].         [place] [facility] complies with applicable Federal civil rights laws and does not discriminate on the basis of race, color, national origin, age, disability, or sex.

## 2017-04-27 ENCOUNTER — OFFICE VISIT (OUTPATIENT)
Dept: CARDIOLOGY | Facility: CLINIC | Age: 72
End: 2017-04-27
Payer: MEDICARE

## 2017-04-27 VITALS — HEIGHT: 64 IN | WEIGHT: 185.88 LBS | OXYGEN SATURATION: 91 % | HEART RATE: 71 BPM | BODY MASS INDEX: 31.73 KG/M2

## 2017-04-27 DIAGNOSIS — Z86.79 PERSONAL HISTORY OF ATRIAL FIBRILLATION: ICD-10-CM

## 2017-04-27 DIAGNOSIS — I10 ESSENTIAL HYPERTENSION: ICD-10-CM

## 2017-04-27 DIAGNOSIS — Z86.79 S/P ABLATION OF ATRIAL FIBRILLATION: ICD-10-CM

## 2017-04-27 DIAGNOSIS — I27.20 PULMONARY HYPERTENSION: Primary | ICD-10-CM

## 2017-04-27 DIAGNOSIS — I48.19 PERSISTENT ATRIAL FIBRILLATION: ICD-10-CM

## 2017-04-27 DIAGNOSIS — I48.0 PAROXYSMAL ATRIAL FIBRILLATION: ICD-10-CM

## 2017-04-27 DIAGNOSIS — E66.9 OBESITY, CLASS I, BMI 30-34.9: ICD-10-CM

## 2017-04-27 DIAGNOSIS — Z79.01 LONG TERM CURRENT USE OF ANTICOAGULANT THERAPY: ICD-10-CM

## 2017-04-27 DIAGNOSIS — I42.9 CARDIOMYOPATHY, UNSPECIFIED TYPE: ICD-10-CM

## 2017-04-27 DIAGNOSIS — I50.22 CHRONIC SYSTOLIC CONGESTIVE HEART FAILURE: ICD-10-CM

## 2017-04-27 DIAGNOSIS — Z98.890 S/P ABLATION OF ATRIAL FIBRILLATION: ICD-10-CM

## 2017-04-27 DIAGNOSIS — E78.2 MIXED HYPERLIPIDEMIA: ICD-10-CM

## 2017-04-27 PROCEDURE — 99214 OFFICE O/P EST MOD 30 MIN: CPT | Mod: S$GLB,,, | Performed by: INTERNAL MEDICINE

## 2017-04-27 PROCEDURE — 99999 PR PBB SHADOW E&M-EST. PATIENT-LVL III: CPT | Mod: PBBFAC,,, | Performed by: INTERNAL MEDICINE

## 2017-04-27 PROCEDURE — 99499 UNLISTED E&M SERVICE: CPT | Mod: S$PBB,,, | Performed by: INTERNAL MEDICINE

## 2017-04-27 PROCEDURE — 1126F AMNT PAIN NOTED NONE PRSNT: CPT | Mod: S$GLB,,, | Performed by: INTERNAL MEDICINE

## 2017-04-27 PROCEDURE — 1160F RVW MEDS BY RX/DR IN RCRD: CPT | Mod: S$GLB,,, | Performed by: INTERNAL MEDICINE

## 2017-04-27 PROCEDURE — 1159F MED LIST DOCD IN RCRD: CPT | Mod: S$GLB,,, | Performed by: INTERNAL MEDICINE

## 2017-04-27 NOTE — PROGRESS NOTES
[facility]    CC: Follow up    Patient ID: Eva Corona is a 71 y.o. female with a past medical history of DM2, Afib s/p ablation (on coumadin), Non-ischemic cardiomyopathy (EF 35-40%), HTN, HLD, who presents for a follow up appointment.  Pertinent history/events include:      -She was unable to tolerate the increase in Toprol XL to 100 mg daily due to feeling weak and tired.  She went back to 50 mg daily, and now feels better on this dosage.  -Increased Toprol XL to 100 mg daily on 11/10/2016 for improved blood pressure control.  -Underwent Afib ablation on 6/18/2015  -Underwent cardiac catherization on 6/3/2015: RHC showed increased right heart pressures: RV:60/14, PAP:63/25. Coronary angiogram revealed normal coronaries and normal LV function.  -Echo from 11/30/2015 shows EF:35-40% (unchanged from 6/19/2015); Mildly to moderately enlarged aortic root.   -Admitted with PAF with RVR and CHF on 1/30/14- underwent successful electrical cardioversion and was placed on sotalol    HPI:  Today, Ms. Corona reports doing well.  Blood pressures have been stable and mainly in 130's at home.  She has no chest pain, significant SOB, LE edema, palpitations, syncope or TIA symptoms.  Taking all medications as prescribed with no issues.  Maintaining a low salt diet.    Past Medical History:   Diagnosis Date    Anticoagulant long-term use     Atrial fibrillation     Cataract     CHF (congestive heart failure)     Diabetes mellitus     Encounter for blood transfusion     Hyperlipidemia     Hypertension     On home oxygen therapy     2L, nightly    Shortness of breath on exertion     Thyroid disease      Past Surgical History:   Procedure Laterality Date    CARDIAC CATHETERIZATION Right     CARDIAC ELECTROPHYSIOLOGY MAPPING AND ABLATION      COLONOSCOPY      EYE SURGERY Bilateral     cataract, implants    HYSTERECTOMY      TVH, ovaries intact, benign      Review of patient's allergies indicates:    Allergen Reactions    Codeine Nausea And Vomiting    Neuromuscular blockers, steroidal      Other reaction(s): Unknown    Morphine Nausea And Vomiting       Current Outpatient Prescriptions:     acetaminophen (TYLENOL) 325 MG tablet, Take 2 tablets (650 mg total) by mouth every 6 (six) hours as needed for Pain., Disp: , Rfl: 0    amlodipine (NORVASC) 5 MG tablet, TAKE 1 TABLET (5 MG TOTAL) BY MOUTH ONCE DAILY., Disp: 90 tablet, Rfl: 3    ascorbic acid (VITAMIN C) 1000 MG tablet, Take 1,000 mg by mouth once daily., Disp: , Rfl:     atorvastatin (LIPITOR) 10 MG tablet, TAKE 1 TABLET (10 MG TOTAL) BY MOUTH ONCE DAILY., Disp: 90 tablet, Rfl: 3    COCONUT OIL ORAL, Take by mouth., Disp: , Rfl:     fluticasone (FLONASE) 50 mcg/actuation nasal spray, 2 sprays by Each Nare route every evening., Disp: 3 Bottle, Rfl: 3    furosemide (LASIX) 40 MG tablet, TAKE 1 TABLET (40 MG TOTAL) BY MOUTH 2 (TWO) TIMES DAILY., Disp: 180 tablet, Rfl: 3    gabapentin (NEURONTIN) 300 MG capsule, TAKE 2 CAPSULES (600 MG TOTAL) BY MOUTH 3 (THREE) TIMES DAILY., Disp: 540 capsule, Rfl: 3    glimepiride (AMARYL) 4 MG tablet, Take 2 tablets (8 mg total) by mouth daily with breakfast., Disp: 180 tablet, Rfl: 3    lansoprazole (PREVACID SOLUTAB) 30 MG disintegrating tablet, Take 30 mg by mouth once daily., Disp: , Rfl:     levothyroxine (SYNTHROID) 75 MCG tablet, TAKE 1 TABLET BY MOUTH ONCE A DAY BEFORE BREAKFAST, Disp: 90 tablet, Rfl: 3    metformin (GLUCOPHAGE) 500 MG tablet, Take 1 tablet (500 mg total) by mouth 2 (two) times daily with meals., Disp: 180 tablet, Rfl: 3    metoprolol succinate (TOPROL-XL) 100 MG 24 hr tablet, Take 1 tablet (100 mg total) by mouth once daily. (Patient taking differently: Take 100 mg by mouth once daily. Take 1/2 tablet PO daily), Disp: 30 tablet, Rfl: 11    MULTIVIT-MIN/FA/CA CARB/VIT K (ONE-A-DAY WOMEN'S 50+ ORAL), Take 1 tablet by mouth once daily. , Disp: , Rfl:     potassium chloride SA  "(K-DUR,KLOR-CON) 20 MEQ tablet, Take 1 tablet (20 mEq total) by mouth once daily., Disp: 30 tablet, Rfl: 11    VITAMIN A ORAL, Take 1 tablet by mouth once daily. , Disp: , Rfl:     warfarin (COUMADIN) 5 MG tablet, 1.5 tablets (7.5 mg) PO on Mon, Wed, Fri.  1 tablet PO (5 mg) all other days., Disp: 135 tablet, Rfl: 3      Social History     Social History    Marital status:      Spouse name: N/A    Number of children: N/A    Years of education: N/A     Occupational History    Not on file.     Social History Main Topics    Smoking status: Never Smoker    Smokeless tobacco: Never Used    Alcohol use No    Drug use: No    Sexual activity: No     Other Topics Concern    Not on file     Social History Narrative     Family History   Problem Relation Age of Onset    Diabetes Mother     Cancer Neg Hx          Review of Systems  Constitution: Denies chills, fever, and sweats.  HENT: Denies headaches or blurry vision.  Cardiovascular: Denies chest pain or irregular heart beat.  Respiratory: Denies cough or shortness of breath.  Gastrointestinal: Denies abdominal pain, nausea, or vomiting.  Musculoskeletal: Denies muscle cramps.  Neurological: Denies dizziness or focal weakness.  Psychiatric/Behavioral: Normal mental status.  Hematologic/Lymphatic: Denies bleeding problem or easy bruising/bleeding.  Skin: Denies rash or suspicious lesions    Physical Examination  Pulse 71  Ht 5' 4" (1.626 m)  Wt 84.3 kg (185 lb 13.6 oz)  LMP  (LMP Unknown)  SpO2 (!) 91%  BMI 31.9 kg/m2    Constitutional: No acute distress, conversant  HEENT: Sclera anicteric, Pupils equal, round and reactive to light, extraocular motions intact, Oropharynx clear  Neck: No JVD, no carotid bruits  Cardiovascular: regular rate and rhythm, no murmur, rubs or gallops, normal S1/S2  Pulmonary: Clear to auscultation bilaterally  Abdominal: Abdomen soft, nontender, nondistended, positive bowel sounds  Extremities: No lower extremity edema, "   Pulses:  Carotid pulses are 2+ on the right side, and 2+ on the left side.  Radial pulses are 2+ on the right side, and 2+ on the left side.   Femoral pulses are 2+ on the right side, and 2+ on the left side.  Skin: No ecchymosis, erythema, or ulcers  Psych: Alert and oriented x 3, appropriate affect  Neuro: CNII-XII intact, no focal deficits    Labs:  Results for MUMTAZ MONTALVO (MRN [id number]) as of 12/9/2016 17:27   Ref. Range 12/2/2016 09:38   Cholesterol Latest Ref Range: 120 - 199 mg/dL 112 (L)   HDL Latest Ref Range: 40 - 75 mg/dL 26 (L)   LDL Cholesterol Latest Ref Range: 63.0 - 159.0 mg/dL 57.4 (L)   Total Cholesterol/HDL Ratio Latest Ref Range: 2.0 - 5.0  4.3   Triglycerides Latest Ref Range: 30 - 150 mg/dL 143       Echo 11/30/2015:  CONCLUSIONS     1 - Mildly to moderately enlarged aortic root.     2 - Mild left atrial enlargement.     3 - Mild left ventricular enlargement.     4 - Eccentric hypertrophy.     5 - Moderately depressed left ventricular systolic function (EF 35-40%).     6 - Low normal to mildly depressed right ventricular systolic function .     7 - No significant change from Echo on 6/19/2015.     Assessment/Plan:   Mumtaz Montalvo is a 71 y.o. female with a past medical history of DM2, Afib s/p ablation (on coumadin), Non-ischemic cardiomyopathy (EF 35-40%), HTN, HLD, who presents for a follow up.     1. HTN- Stable.  She has not been able to tolerate small changes to her regimen.  Continue current regimen.  Continue a low salt diet and weight loss.       2. Non-ischemic Cardiomyopathy-  Asymptomatic at this time and well compensated on exam.  Continue to monitor.  Repeat echo prior to next visit.      3. PAF s/p ablation- Continue anticoagulation with coumadin.      4. HLD- Continue atorvastatin 10 mg daily.      Follow up in 6 months with echo prior    Total duration of face to face visit time 45 minutes.  Total time spent counseling greater than fifty percent of total visit  time.  Counseling included discussion regarding imaging findings, diagnosis, possibilities, treatment options, risks and benefits.      Justus Cleveland MD, PhD  Interventional Cardiology

## 2017-04-27 NOTE — MR AVS SNAPSHOT
[facility]  [address], [address]  [place] [address]  Phone: [phone]                  Eva Corona   2017 3:30 PM   Office Visit    Description:  Female : 1945   Provider:  Justus Cleveland MD   Department:  [place] [facility]           Reason for Visit     Follow-up           Diagnoses this Visit        Comments    Pulmonary hypertension    -  Primary     Essential hypertension         Cardiomyopathy, unspecified type         Chronic systolic congestive heart failure         Persistent atrial fibrillation         Paroxysmal atrial fibrillation         Mixed hyperlipidemia         Obesity, Class I, BMI 30-34.9         Personal history of atrial fibrillation         Long term current use of anticoagulant therapy         S/P ablation of atrial fibrillation                To Do List           Future Appointments        Provider Department Dept Phone    2017 2:30 PM Beronica Ivory, PharmD [facility] [phone]    10/31/2017 1:00 PM MD [place] Torres [facility] [phone]      Goals (5 Years of Data)     None      [facility]     [facility] On Call Nurse Care Line -  Assistance  Unless otherwise directed by your provider, please contact [facility], our nurse care line that is available for  assistance.     Registered nurses in the [facility] On Call Center provide: appointment scheduling, clinical advisement, health education, and other advisory services.  Call: [phone] (toll free)               Medications           Message regarding Medications     Verify the changes and/or additions to your medication regime listed below are the same as discussed with your clinician today.  If any of these changes or additions are incorrect, please notify your healthcare provider.             Verify that the below list of medications is an accurate representation of the medications you are currently taking.  If none  "reported, the list may be blank. If incorrect, please contact your healthcare provider. Carry this list with you in case of emergency.           Current Medications     acetaminophen (TYLENOL) 325 MG tablet Take 2 tablets (650 mg total) by mouth every 6 (six) hours as needed for Pain.    amlodipine (NORVASC) 5 MG tablet TAKE 1 TABLET (5 MG TOTAL) BY MOUTH ONCE DAILY.    ascorbic acid (VITAMIN C) 1000 MG tablet Take 1,000 mg by mouth once daily.    atorvastatin (LIPITOR) 10 MG tablet TAKE 1 TABLET (10 MG TOTAL) BY MOUTH ONCE DAILY.    COCONUT OIL ORAL Take by mouth.    fluticasone (FLONASE) 50 mcg/actuation nasal spray 2 sprays by Each Nare route every evening.    furosemide (LASIX) 40 MG tablet TAKE 1 TABLET (40 MG TOTAL) BY MOUTH 2 (TWO) TIMES DAILY.    gabapentin (NEURONTIN) 300 MG capsule TAKE 2 CAPSULES (600 MG TOTAL) BY MOUTH 3 (THREE) TIMES DAILY.    glimepiride (AMARYL) 4 MG tablet Take 2 tablets (8 mg total) by mouth daily with breakfast.    lansoprazole (PREVACID SOLUTAB) 30 MG disintegrating tablet Take 30 mg by mouth once daily.    levothyroxine (SYNTHROID) 75 MCG tablet TAKE 1 TABLET BY MOUTH ONCE A DAY BEFORE BREAKFAST    metformin (GLUCOPHAGE) 500 MG tablet Take 1 tablet (500 mg total) by mouth 2 (two) times daily with meals.    metoprolol succinate (TOPROL-XL) 100 MG 24 hr tablet Take 1 tablet (100 mg total) by mouth once daily.    MULTIVIT-MIN/FA/CA CARB/VIT K (ONE-A-DAY WOMEN'S 50+ ORAL) Take 1 tablet by mouth once daily.     potassium chloride SA (K-DUR,KLOR-CON) 20 MEQ tablet Take 1 tablet (20 mEq total) by mouth once daily.    VITAMIN A ORAL Take 1 tablet by mouth once daily.     warfarin (COUMADIN) 5 MG tablet 1.5 tablets (7.5 mg) PO on Mon, Wed, Fri.  1 tablet PO (5 mg) all other days.           Clinical Reference Information           Your Vitals Were     Pulse Height Weight Last Period SpO2 BMI    71 5' 4" (1.626 m) 84.3 kg (185 lb 13.6 oz) (LMP Unknown) 91% 31.9 kg/m2      Blood Pressure     "      Most Recent Value    Right Arm BP - Sitting  163/73    Left Arm BP - Sitting  167/69      Allergies as of 4/27/2017     Codeine    Neuromuscular Blockers, Steroidal    Morphine      Immunizations Administered on Date of Encounter - 4/27/2017     None      Orders Placed During Today's Visit     Future Labs/Procedures Expected by Expires    2D Echo w/ Color Flow Doppler  10/11/2017 4/27/2018      Instructions    Continue current medication regimen  Follow up in 6 months with echo prior       Language Assistance Services     ATTENTION: Language assistance services are available, free of charge. Please call [phone].      ATENCIÓN: Si habla español, tiene a galdamez disposición servicios gratuitos de asistencia lingüística. Llame al [phone].     CHÚ Ý: N?u b?n nói Ti?ng Vi?t, có các d?ch v? h? tr? ngôn ng? mi?n phí dành cho b?n. G?i s? [phone].         [facility] complies with applicable Federal civil rights laws and does not discriminate on the basis of race, color, national origin, age, disability, or sex.

## 2017-05-08 ENCOUNTER — HOSPITAL ENCOUNTER (EMERGENCY)
Facility: HOSPITAL | Age: 72
Discharge: HOME | End: 2017-05-08
Attending: EMERGENCY MEDICINE
Payer: MEDICARE

## 2017-05-08 VITALS
BODY MASS INDEX: 30.82 KG/M2 | OXYGEN SATURATION: 91 % | DIASTOLIC BLOOD PRESSURE: 63 MMHG | SYSTOLIC BLOOD PRESSURE: 145 MMHG | RESPIRATION RATE: 18 BRPM | WEIGHT: 185 LBS | HEIGHT: 65 IN | HEART RATE: 96 BPM | TEMPERATURE: 99 F

## 2017-05-08 DIAGNOSIS — R50.9 FEVER: ICD-10-CM

## 2017-05-08 DIAGNOSIS — J06.9 VIRAL URI: Primary | ICD-10-CM

## 2017-05-08 LAB
ALBUMIN SERPL BCP-MCNC: 4.1 G/DL
ALP SERPL-CCNC: 88 U/L
ALT SERPL W/O P-5'-P-CCNC: 36 U/L
ANION GAP SERPL CALC-SCNC: 11 MMOL/L
AST SERPL-CCNC: 35 U/L
BASOPHILS # BLD AUTO: 0 K/UL
BASOPHILS NFR BLD: 0 %
BILIRUB SERPL-MCNC: 0.7 MG/DL
BUN SERPL-MCNC: 14 MG/DL
CALCIUM SERPL-MCNC: 9.7 MG/DL
CHLORIDE SERPL-SCNC: 99 MMOL/L
CO2 SERPL-SCNC: 28 MMOL/L
CREAT SERPL-MCNC: 0.8 MG/DL
DIFFERENTIAL METHOD: ABNORMAL
EOSINOPHIL # BLD AUTO: 0.2 K/UL
EOSINOPHIL NFR BLD: 1.7 %
ERYTHROCYTE [DISTWIDTH] IN BLOOD BY AUTOMATED COUNT: 15.6 %
EST. GFR  (AFRICAN AMERICAN): >60 ML/MIN/1.73 M^2
EST. GFR  (NON AFRICAN AMERICAN): >60 ML/MIN/1.73 M^2
GLUCOSE SERPL-MCNC: 128 MG/DL
HCT VFR BLD AUTO: 35 %
HGB BLD-MCNC: 11.4 G/DL
LACTATE SERPL-SCNC: 0.9 MMOL/L
LYMPHOCYTES # BLD AUTO: 1.1 K/UL
LYMPHOCYTES NFR BLD: 11.9 %
MCH RBC QN AUTO: 27.1 PG
MCHC RBC AUTO-ENTMCNC: 32.7 %
MCV RBC AUTO: 83 FL
MONOCYTES # BLD AUTO: 0.6 K/UL
MONOCYTES NFR BLD: 6.1 %
NEUTROPHILS # BLD AUTO: 7.4 K/UL
NEUTROPHILS NFR BLD: 80.3 %
PLATELET # BLD AUTO: 234 K/UL
PMV BLD AUTO: 8.2 FL
POTASSIUM SERPL-SCNC: 3.9 MMOL/L
PROT SERPL-MCNC: 7.6 G/DL
RBC # BLD AUTO: 4.22 M/UL
SODIUM SERPL-SCNC: 138 MMOL/L
WBC # BLD AUTO: 9.2 K/UL

## 2017-05-08 PROCEDURE — 87040 BLOOD CULTURE FOR BACTERIA: CPT

## 2017-05-08 PROCEDURE — 36415 COLL VENOUS BLD VENIPUNCTURE: CPT

## 2017-05-08 PROCEDURE — 80053 COMPREHEN METABOLIC PANEL: CPT

## 2017-05-08 PROCEDURE — 83605 ASSAY OF LACTIC ACID: CPT

## 2017-05-08 PROCEDURE — 25000003 PHARM REV CODE 250: Performed by: EMERGENCY MEDICINE

## 2017-05-08 PROCEDURE — 85025 COMPLETE CBC W/AUTO DIFF WBC: CPT

## 2017-05-08 PROCEDURE — 99284 EMERGENCY DEPT VISIT MOD MDM: CPT

## 2017-05-08 RX ORDER — ACETAMINOPHEN 325 MG/1
650 TABLET ORAL
Status: COMPLETED | OUTPATIENT
Start: 2017-05-08 | End: 2017-05-08

## 2017-05-08 RX ADMIN — ACETAMINOPHEN 650 MG: 325 TABLET ORAL at 07:05

## 2017-05-08 NOTE — ED PROVIDER NOTES
"Encounter Date: 5/8/2017    SCRIBE #1 NOTE: I, Vesta Greene, am scribing for, and in the presence of, Dr. Tran.       History     Chief Complaint   Patient presents with    Fever     "since after lunch"     Review of patient's allergies indicates:   Allergen Reactions    Codeine Nausea And Vomiting    Neuromuscular blockers, steroidal      Other reaction(s): Unknown    Morphine Nausea And Vomiting     HPI Comments: 05/08/2017  6:50 PM     Chief Complaint: Fever    The patient is a 71 y.o. female with PMHx of anticoagulant long-term use; A-fib; cataract; CHF; DM Encounter for blood transfusion; HLD; HTN; On home oxygen therapy; SOB on exertion; and thyroid disease and PSHx of cardiac catheterization; colonoscopy; cardiac electrophysiology mapping and ablation; eye surgery; and hysterectomy presents to the ED c/o a fever with associated congestion and yellow sputum that began 2 days ago. Pt attempted tx with Mucinex DM and Tylenol. Pt denies change in appetite, change in breathing, abdominal pain, rash, or sore throat. Pt reports great grand baby is sick and may have a viral illness. Pt states she cannot take Aspirin.         The history is provided by the patient.     Past Medical History:   Diagnosis Date    Anticoagulant long-term use     Atrial fibrillation     Cataract     CHF (congestive heart failure)     Diabetes mellitus     Encounter for blood transfusion     Hyperlipidemia     Hypertension     On home oxygen therapy     2L, nightly    Shortness of breath on exertion     Thyroid disease      Past Surgical History:   Procedure Laterality Date    CARDIAC CATHETERIZATION Right     CARDIAC ELECTROPHYSIOLOGY MAPPING AND ABLATION      COLONOSCOPY      EYE SURGERY Bilateral     cataract, implants    HYSTERECTOMY      TVH, ovaries intact, benign     Family History   Problem Relation Age of Onset    Diabetes Mother     Cancer Neg Hx      Social History   Substance Use Topics    Smoking " status: Never Smoker    Smokeless tobacco: Never Used    Alcohol use No     Review of Systems   Constitutional: Positive for fever.   HENT: Positive for congestion. Negative for sore throat.    Eyes: Negative for visual disturbance.   Respiratory:        + Yellow sputum     Cardiovascular: Negative for chest pain.   Gastrointestinal: Negative for abdominal pain, diarrhea, nausea and vomiting.   Genitourinary: Negative for difficulty urinating and pelvic pain.   Musculoskeletal: Negative for arthralgias.   Skin: Negative for rash.   Neurological: Negative for weakness.   Psychiatric/Behavioral: Negative for confusion.       Physical Exam   Initial Vitals   BP Pulse Resp Temp SpO2   05/08/17 1759 05/08/17 1759 05/08/17 1759 05/08/17 1759 05/08/17 1759   149/65 110 18 100 °F (37.8 °C) 94 %     Physical Exam    Nursing note and vitals reviewed.  Constitutional: She appears well-developed and well-nourished. No distress.   HENT:   Head: Normocephalic and atraumatic.   Eyes: EOM are normal. Pupils are equal, round, and reactive to light.   Neck: Normal range of motion. Neck supple.   Cardiovascular: Regular rhythm.   Mildly tachycardic     Pulmonary/Chest: Effort normal and breath sounds normal. She has no wheezes. She has no rhonchi. She has no rales.   Abdominal: Soft. She exhibits no distension. There is no tenderness.   Neurological: She is alert and oriented to person, place, and time. No cranial nerve deficit or sensory deficit.   Skin: Skin is warm and dry.         ED Course   Procedures  Labs Reviewed   CBC W/ AUTO DIFFERENTIAL - Abnormal; Notable for the following:        Result Value    Hemoglobin 11.4 (*)     Hematocrit 35.0 (*)     RDW 15.6 (*)     MPV 8.2 (*)     Gran% 80.3 (*)     Lymph% 11.9 (*)     All other components within normal limits   COMPREHENSIVE METABOLIC PANEL - Abnormal; Notable for the following:     Glucose 128 (*)     All other components within normal limits   CULTURE, BLOOD   LACTIC  ACID, PLASMA                 Imaging Results         X-Ray Chest PA And Lateral (Final result) Result time:  05/08/17 18:41:13    Final result by Brian Anand MD (05/08/17 18:41:13)    Impression:     Radiographic findings which could relate to cardiac decompensation/CHF..      Electronically signed by: Brian Anand MD  Date:     05/08/17  Time:    18:41     Narrative:    Comparison:Chest x-ray-6/22/2015    Technique: PA and lateral radiographs of the chest were acquired     Findings:     The cardiac silhouette is enlarged.  There is prominence of the central pulmonary vasculature.  There is atherosclerotic calcification present within the aortic arch.  No airspace consolidation or pulmonary mass. No significant volume of pleural fluid or pneumothorax. The bones reveal degenerative changes of the thoracic spine and acromioclavicular joints.            (radiology reading, visualized by me)          Scribe Attestation:   Scribe #1: I performed the above scribed service and the documentation accurately describes the services I performed. I attest to the accuracy of the note.    Attending Attestation:           Physician Attestation for Scribe:  Physician Attestation Statement for Scribe #1: I, Dr. Tran, reviewed documentation, as scribed by Vesta Greene  in my presence, and it is both accurate and complete.         Eva Corona is a 71 y.o. female presenting with subjective fever on the part of the patient and the setting of new upper respiratory symptoms and sick contact in a grandchild.  I suspect viral URI.  The patient has not actually had any measured fevers with MAXIMUM TEMPERATURE 37.8 and the emergency department this evening.  Initial tachycardia resolved with observation.  Lactic acid is normal and I doubt sepsis.  Lungs are clear to auscultation with normal work of breathing.  No sign of pneumonia.  Radiology reading reviewed it with mild CHF pattern very similar to prior with no sign of  new pulmonary edema or decompensated heart failure.  I do not think antibiotics are indicated.  I do not think requires admission for observation.  Decongestants with acetaminophen as necessary for subjective fever discussed in detail.  Return precautions reviewed.  Outpatient follow-up recommended.        ED Course     Clinical Impression:   The primary encounter diagnosis was Viral URI. A diagnosis of Fever was also pertinent to this visit.          Luke Tran MD  05/08/17 [id number]

## 2017-05-08 NOTE — ED NOTES
Assumed care from JARRED, RN.  Patient awake, alert and oriented x 3, skin warm and dry, in NAD with family at bedside.

## 2017-05-08 NOTE — ED AVS SNAPSHOT
[facility] [address]  [place] [address]               Eva Corona   2017  6:17 PM   ED    Description:  Female : 1945   Department:  [facility]           Your Care was Coordinated By:     Provider Role From To    Luke Tran MD Attending Provider 17 0407 --      Reason for Visit     Fever           Diagnoses this Visit        Comments    Viral URI    -  Primary     Fever           ED Disposition     ED Disposition Condition Comment    Discharge             To Do List           Follow-up Information     Follow up with Roxanna Peterson MD.    Specialty:  Family Medicine    Why:  1 week    Contact information:    [facility] [address]  [phone]        [facility] On Call     [facility] Nurse Care Line -  Assistance  Unless otherwise directed by your provider, please contact [facility], our nurse care line that is available for  assistance.     Registered nurses in the [facility] Center provide: appointment scheduling, clinical advisement, health education, and other advisory services.  Call: [phone] (toll free)               Medications           Message regarding Medications     Verify the changes and/or additions to your medication regime listed below are the same as discussed with your clinician today.  If any of these changes or additions are incorrect, please notify your healthcare provider.        These medications were administered today        Dose Freq    acetaminophen tablet 650 mg 650 mg ED 1 Time    Sig: Take 2 tablets (650 mg total) by mouth ED 1 Time.    Class: Normal    Route: Oral           Verify that the below list of medications is an accurate representation of the medications you are currently taking.  If none reported, the list may be blank. If incorrect, please contact your healthcare provider. Carry this list with you in case of emergency.          "  Current Medications     acetaminophen (TYLENOL) 325 MG tablet Take 2 tablets (650 mg total) by mouth every 6 (six) hours as needed for Pain.    amlodipine (NORVASC) 5 MG tablet TAKE 1 TABLET (5 MG TOTAL) BY MOUTH ONCE DAILY.    ascorbic acid (VITAMIN C) 1000 MG tablet Take 1,000 mg by mouth once daily.    atorvastatin (LIPITOR) 10 MG tablet TAKE 1 TABLET (10 MG TOTAL) BY MOUTH ONCE DAILY.    COCONUT OIL ORAL Take by mouth.    fluticasone (FLONASE) 50 mcg/actuation nasal spray 2 sprays by Each Nare route every evening.    furosemide (LASIX) 40 MG tablet TAKE 1 TABLET (40 MG TOTAL) BY MOUTH 2 (TWO) TIMES DAILY.    gabapentin (NEURONTIN) 300 MG capsule TAKE 2 CAPSULES (600 MG TOTAL) BY MOUTH 3 (THREE) TIMES DAILY.    glimepiride (AMARYL) 4 MG tablet Take 2 tablets (8 mg total) by mouth daily with breakfast.    lansoprazole (PREVACID SOLUTAB) 30 MG disintegrating tablet Take 30 mg by mouth once daily.    levothyroxine (SYNTHROID) 75 MCG tablet TAKE 1 TABLET BY MOUTH ONCE A DAY BEFORE BREAKFAST    metformin (GLUCOPHAGE) 500 MG tablet Take 1 tablet (500 mg total) by mouth 2 (two) times daily with meals.    metoprolol succinate (TOPROL-XL) 100 MG 24 hr tablet Take 1 tablet (100 mg total) by mouth once daily.    MULTIVIT-MIN/FA/CA CARB/VIT K (ONE-A-DAY WOMEN'S 50+ ORAL) Take 1 tablet by mouth once daily.     potassium chloride SA (K-DUR,KLOR-CON) 20 MEQ tablet Take 1 tablet (20 mEq total) by mouth once daily.    VITAMIN A ORAL Take 1 tablet by mouth once daily.     warfarin (COUMADIN) 5 MG tablet 1.5 tablets (7.5 mg) PO on Mon, Wed, Fri.  1 tablet PO (5 mg) all other days.           Clinical Reference Information           Your Vitals Were     BP Pulse Temp Resp Height Weight    145/63 96 100 °F (37.8 °C) 18 5' 5" (1.651 m) 83.9 kg (185 lb)    Last Period SpO2 BMI          (LMP Unknown) 91% 30.79 kg/m2        Allergies as of 5/8/2017        Reactions    Codeine Nausea And Vomiting    Neuromuscular Blockers, Steroidal     " Other reaction(s): Unknown    Morphine Nausea And Vomiting      Immunizations Administered on Date of Encounter - 5/8/2017     None      ED Micro, Lab, POCT     Start Ordered       Status Ordering Provider    05/08/17 1858 05/08/17 1857  Lactic acid, plasma  STAT      Final result     05/08/17 1807 05/08/17 1806    STAT,   Status:  Canceled      Canceled     05/08/17 1807 05/08/17 1806    STAT,   Status:  Canceled      Canceled     05/08/17 1807 05/08/17 1806  CBC auto differential  STAT      Final result     05/08/17 1807 05/08/17 1806  Comprehensive metabolic panel  STAT      Final result     05/08/17 1807 05/08/17 1806  Blood culture  STAT      In process       ED Imaging Orders     Start Ordered       Status Ordering Provider    05/08/17 1807 05/08/17 1806  X-Ray Chest PA And Lateral  1 time imaging      Final result       Discharge References/Attachments     URI, VIRAL, NO ABX (ADULT) (ENGLISH)      Your Scheduled Appointments     May 09, 2017  2:30 PM CDT   Anticoagulation with Fadi Gomez - Coumadin ([facility])    [address]  [place] [address]   [phone]            Oct 31, 2017  1:00 PM CDT   Established Patient Visit with MD [place] Torres - Cardiology ([facility])    [address]  [place] [address]   [phone]               [facility] complies with applicable Federal civil rights laws and does not discriminate on the basis of race, color, national origin, age, disability, or sex.        Language Assistance Services     ATTENTION: Language assistance services are available, free of charge. Please call [phone].      ATENCIÓN: Si habla kimberly, tiene a galdamez disposición servicios gratuitos de asistencia lingüística. Llame al [phone].     CHÚ Ý: N?u b?n nói Ti?ng Vi?t, có các d?ch v? h? tr? ngôn ng? mi?n phí dành cho b?n. G?i s? [phone].

## 2017-05-09 ENCOUNTER — ANTI-COAG VISIT (OUTPATIENT)
Dept: CARDIOLOGY | Facility: CLINIC | Age: 72
End: 2017-05-09
Payer: MEDICARE

## 2017-05-09 DIAGNOSIS — Z79.01 LONG TERM CURRENT USE OF ANTICOAGULANT THERAPY: Primary | ICD-10-CM

## 2017-05-09 DIAGNOSIS — I48.91 ATRIAL FIBRILLATION, UNSPECIFIED TYPE: ICD-10-CM

## 2017-05-09 LAB — INR PPP: 1.6 (ref 2–3)

## 2017-05-09 PROCEDURE — 85610 PROTHROMBIN TIME: CPT | Mod: QW,S$GLB,, | Performed by: PHARMACIST

## 2017-05-09 PROCEDURE — 99211 OFF/OP EST MAY X REQ PHY/QHP: CPT | Mod: 25,S$GLB,, | Performed by: PHARMACIST

## 2017-05-09 NOTE — ED NOTES
"Pt presents to ER with c/o fever since lunch today. Pt reports she "did not take temp at home. I just felt my eyes get heavy and I knew I had a fever." Pt laying in bed, resting comfortably, VSS. Pt denies chest pain, SOB. Will continue to monitor.   "

## 2017-05-09 NOTE — MR AVS SNAPSHOT
[facility]  [address]  [place] [address]  Phone: [phone]                  Eva Corona   2017 2:30 PM   Anti-coag visit    Description:  Female : 1945   Provider:  Beronica Ivory PharmD   Department:  [facility]           Diagnoses this Visit        Comments    Long term current use of anticoagulant therapy    -  Primary     Atrial fibrillation, unspecified type                To Do List           Future Appointments        Provider Department Dept Phone    2017 1:30 PM Beronica Ivory, [facility] [phone]    10/31/2017 1:00 PM Justus Cleveland MD [facility] [phone]      Goals (5 Years of Data)     None      [facility]     [facility] Care Line -  Assistance  Unless otherwise directed by your provider, please contact [facility], our nurse care line that is available for  assistance.     Registered nurses in the [facility] provide: appointment scheduling, clinical advisement, health education, and other advisory services.  Call: [phone] (toll free)               Medications           Message regarding Medications     Verify the changes and/or additions to your medication regime listed below are the same as discussed with your clinician today.  If any of these changes or additions are incorrect, please notify your healthcare provider.             Verify that the below list of medications is an accurate representation of the medications you are currently taking.  If none reported, the list may be blank. If incorrect, please contact your healthcare provider. Carry this list with you in case of emergency.           Current Medications     acetaminophen (TYLENOL) 325 MG tablet Take 2 tablets (650 mg total) by mouth every 6 (six) hours as needed for Pain.    amlodipine (NORVASC) 5 MG tablet TAKE 1 TABLET (5 MG TOTAL) BY MOUTH ONCE DAILY.    ascorbic acid  (VITAMIN C) 1000 MG tablet Take 1,000 mg by mouth once daily.    atorvastatin (LIPITOR) 10 MG tablet TAKE 1 TABLET (10 MG TOTAL) BY MOUTH ONCE DAILY.    COCONUT OIL ORAL Take by mouth.    fluticasone (FLONASE) 50 mcg/actuation nasal spray 2 sprays by Each Nare route every evening.    furosemide (LASIX) 40 MG tablet TAKE 1 TABLET (40 MG TOTAL) BY MOUTH 2 (TWO) TIMES DAILY.    gabapentin (NEURONTIN) 300 MG capsule TAKE 2 CAPSULES (600 MG TOTAL) BY MOUTH 3 (THREE) TIMES DAILY.    glimepiride (AMARYL) 4 MG tablet Take 2 tablets (8 mg total) by mouth daily with breakfast.    lansoprazole (PREVACID SOLUTAB) 30 MG disintegrating tablet Take 30 mg by mouth once daily.    levothyroxine (SYNTHROID) 75 MCG tablet TAKE 1 TABLET BY MOUTH ONCE A DAY BEFORE BREAKFAST    metformin (GLUCOPHAGE) 500 MG tablet Take 1 tablet (500 mg total) by mouth 2 (two) times daily with meals.    metoprolol succinate (TOPROL-XL) 100 MG 24 hr tablet Take 1 tablet (100 mg total) by mouth once daily.    MULTIVIT-MIN/FA/CA CARB/VIT K (ONE-A-DAY WOMEN'S 50+ ORAL) Take 1 tablet by mouth once daily.     potassium chloride SA (K-DUR,KLOR-CON) 20 MEQ tablet Take 1 tablet (20 mEq total) by mouth once daily.    VITAMIN A ORAL Take 1 tablet by mouth once daily.     warfarin (COUMADIN) 5 MG tablet 1.5 tablets (7.5 mg) PO on Mon, Wed, Fri.  1 tablet PO (5 mg) all other days.           Clinical Reference Information           Your Vitals Were     Last Period                   (LMP Unknown)           Allergies as of 5/9/2017     Codeine    Neuromuscular Blockers, Steroidal    Morphine      Immunizations Administered on Date of Encounter - 5/9/2017     None      Orders Placed During Today's Visit      Normal Orders This Visit    POCT INR          5/9/2017  2:41 PM - Beronica Ivory, PharmD      Component Results     Component    INR    1.6      April 2017 Details    Sun Mon Tue Wed Thu Fri Sat           1                 2               3               4                5               6               7               8                 9      5 mg         10      7.5 mg         11   2.4   5 mg   See details      12      7.5 mg         13      5 mg         14      7.5 mg         15      5 mg           16      5 mg         17      7.5 mg         18      5 mg         19      7.5 mg         20      5 mg         21      7.5 mg         22      5 mg           23      5 mg         24      7.5 mg         25      5 mg         26      7.5 mg         27      5 mg         28      7.5 mg         29      5 mg           30      5 mg                Date Details   04/11 Last INR check   INR: 2.4                 May 2017 Details    Sun Mon Tue Wed Thu Fri Sat      1      7.5 mg         2      5 mg         3      7.5 mg         4      5 mg         5      7.5 mg         6      5 mg           7      5 mg         8      5 mg         9   1.6   5 mg   See details      10      7.5 mg         11      5 mg         12      7.5 mg         13      5 mg           14      5 mg         15      7.5 mg         16      5 mg         17      7.5 mg         18      5 mg         19      7.5 mg         20      5 mg           21      5 mg         22      7.5 mg         23      5 mg         24      7.5 mg         25      5 mg         26      7.5 mg         27      5 mg           28      5 mg         29      7.5 mg         30            31                   Date Details   05/09 This INR check   INR: 1.6       Date of next INR:  5/30/2017               How to take your warfarin dose     To take:  5 mg Take 1 of the 5 mg tablets.    To take:  7.5 mg Take 1.5 of the 5 mg tablets.           Anticoagulation Summary as of 5/9/2017     Maintenance plan 7.5 mg (5 mg x 1.5) on Mon, Wed, Fri; 5 mg (5 mg x 1) all other days    Full instructions 7.5 mg on Mon, Wed, Fri; 5 mg all other days    Next INR check 5/30/2017      Anticoagulation Episode Summary     Comments [facility]  (po)      Patient Findings     Positives  Change in medications, Other complaints    Negatives Signs/symptoms of thrombosis, Signs/symptoms of bleeding, Laboratory test error suspected, Change in health, Change in alcohol use, Change in activity, Upcoming invasive procedure, Emergency department visit, Upcoming dental procedure, Missed doses, Extra doses, Change in diet/appetite, Hospital admission, Bruising    Comments Pt reports she has been feeling poorly for a few days, dx with bronchitis yesterday and started on doxy.  She reports she was feeling very poorly  Last night and running fever, so went to ED.  No med changes, but they did assess with CXR and blood/urine work.  She reports appetite is down.      Language Assistance Services     ATTENTION: Language assistance services are available, free of charge. Please call [phone].      ATENCIÓN: Si fabiola harris, tiene a galdamez disposición servicios gratuitos de asistencia lingüística. Llame al [phone].     CHÚ Ý: N?u b?n nói Ti?ng Vi?t, có các d?ch v? h? tr? ngôn ng? mi?n phí dành cho b?n. G?i s? [phone].         [facility] complies with applicable Federal civil rights laws and does not discriminate on the basis of race, color, national origin, age, disability, or sex.

## 2017-05-09 NOTE — PROGRESS NOTES
"INR subtherapeutic today.  Pt reports she has been feeling poorly for a few days, dx with bronchitis yesterday and started on doxy 100mg 2 tab first day then 1 daily x 7 days .  She reports she was feeling very poorly  Last night and running fever, so went to ED.  No med changes, but they did assess with CXR and blood/urine work.  She reports appetite is down.  She denies any missed doses. And confirms the dose from last night.    Since the doxy dose is shorter duration and frequency is less than normal, AND, the iNR is subtherapuetic today, I will not adjust coumadin dose.  Will maintain and recheck in 3 weeks "This note will not be shared with the patient."  "

## 2017-05-14 LAB — BACTERIA BLD CULT: NORMAL

## 2017-05-23 RX ORDER — GLIMEPIRIDE 4 MG/1
TABLET ORAL
Qty: 180 TABLET | Refills: 3 | Status: SHIPPED | OUTPATIENT
Start: 2017-05-23 | End: 2018-03-16 | Stop reason: SDUPTHER

## 2017-05-23 RX ORDER — LEVOTHYROXINE SODIUM 75 UG/1
TABLET ORAL
Qty: 90 TABLET | Refills: 3 | Status: SHIPPED | OUTPATIENT
Start: 2017-05-23 | End: 2018-03-16 | Stop reason: SDUPTHER

## 2017-05-26 ENCOUNTER — DOCUMENTATION ONLY (OUTPATIENT)
Dept: FAMILY MEDICINE | Facility: CLINIC | Age: 72
End: 2017-05-26

## 2017-05-26 ENCOUNTER — OFFICE VISIT (OUTPATIENT)
Dept: FAMILY MEDICINE | Facility: CLINIC | Age: 72
End: 2017-05-26
Payer: MEDICARE

## 2017-05-26 ENCOUNTER — LAB VISIT (OUTPATIENT)
Dept: LAB | Facility: HOSPITAL | Age: 72
End: 2017-05-26
Attending: FAMILY MEDICINE
Payer: MEDICARE

## 2017-05-26 VITALS
BODY MASS INDEX: 30.56 KG/M2 | WEIGHT: 183.44 LBS | DIASTOLIC BLOOD PRESSURE: 57 MMHG | SYSTOLIC BLOOD PRESSURE: 133 MMHG | TEMPERATURE: 98 F | OXYGEN SATURATION: 95 % | HEART RATE: 71 BPM | HEIGHT: 65 IN

## 2017-05-26 DIAGNOSIS — Z23 NEED FOR PNEUMOCOCCAL VACCINATION: ICD-10-CM

## 2017-05-26 DIAGNOSIS — J34.89 SORE NOSE: ICD-10-CM

## 2017-05-26 DIAGNOSIS — E66.9 OBESITY, CLASS I, BMI 30-34.9: ICD-10-CM

## 2017-05-26 DIAGNOSIS — I50.9 ACUTE ON CHRONIC CONGESTIVE HEART FAILURE, UNSPECIFIED CONGESTIVE HEART FAILURE TYPE: ICD-10-CM

## 2017-05-26 LAB
BNP SERPL-MCNC: 440 PG/ML
CREAT UR-MCNC: 62 MG/DL
MICROALBUMIN UR DL<=1MG/L-MCNC: 11 UG/ML
MICROALBUMIN/CREATININE RATIO: 17.7 UG/MG

## 2017-05-26 PROCEDURE — G0009 ADMIN PNEUMOCOCCAL VACCINE: HCPCS | Mod: S$GLB,,, | Performed by: PHYSICIAN ASSISTANT

## 2017-05-26 PROCEDURE — 83880 ASSAY OF NATRIURETIC PEPTIDE: CPT

## 2017-05-26 PROCEDURE — 83036 HEMOGLOBIN GLYCOSYLATED A1C: CPT

## 2017-05-26 PROCEDURE — 36415 COLL VENOUS BLD VENIPUNCTURE: CPT | Mod: PO

## 2017-05-26 PROCEDURE — 1126F AMNT PAIN NOTED NONE PRSNT: CPT | Mod: S$GLB,,, | Performed by: PHYSICIAN ASSISTANT

## 2017-05-26 PROCEDURE — 99999 PR PBB SHADOW E&M-EST. PATIENT-LVL III: CPT | Mod: PBBFAC,,, | Performed by: PHYSICIAN ASSISTANT

## 2017-05-26 PROCEDURE — 1159F MED LIST DOCD IN RCRD: CPT | Mod: S$GLB,,, | Performed by: PHYSICIAN ASSISTANT

## 2017-05-26 PROCEDURE — 3045F PR MOST RECENT HEMOGLOBIN A1C LEVEL 7.0-9.0%: CPT | Mod: S$GLB,,, | Performed by: PHYSICIAN ASSISTANT

## 2017-05-26 PROCEDURE — 90670 PCV13 VACCINE IM: CPT | Mod: S$GLB,,, | Performed by: PHYSICIAN ASSISTANT

## 2017-05-26 PROCEDURE — 99214 OFFICE O/P EST MOD 30 MIN: CPT | Mod: S$GLB,,, | Performed by: PHYSICIAN ASSISTANT

## 2017-05-26 PROCEDURE — 99499 UNLISTED E&M SERVICE: CPT | Mod: S$PBB,,, | Performed by: PHYSICIAN ASSISTANT

## 2017-05-26 RX ORDER — MUPIROCIN 20 MG/G
OINTMENT TOPICAL 3 TIMES DAILY
Qty: 22 G | Refills: 0 | Status: SHIPPED | OUTPATIENT
Start: 2017-05-26 | End: 2017-06-05

## 2017-05-26 NOTE — PROGRESS NOTES
Subjective:       Patient ID: Eva Corona is a 71 y.o. female.    Chief Complaint: Follow-up (Emergency room/URI)    HPI   Patient is a 71 year old  female presenting to the clinic for ER f/u URI. Patient reports symptoms are gradually improving. She still has cough with mild amount of mucous coming up. She has been taking mucinex DM. She has also completed course of doxycycline 100mg BID given to her by Dr. Carver. She reports wheezing and shortness of breath have improved.   Review of Systems   Constitutional: Negative for activity change, appetite change, chills, diaphoresis, fatigue and fever.   HENT: Positive for congestion, postnasal drip, rhinorrhea and sinus pressure. Negative for nosebleeds.    Respiratory: Positive for cough. Negative for shortness of breath and wheezing.    Cardiovascular: Negative.  Negative for chest pain.   Gastrointestinal: Negative for abdominal pain, blood in stool, constipation, diarrhea, nausea and vomiting.   Genitourinary: Negative for dysuria, frequency, hematuria and urgency.   Musculoskeletal: Negative.    Skin: Negative.  Negative for color change and rash.   Neurological: Negative for dizziness and syncope.   Psychiatric/Behavioral: Negative for agitation, behavioral problems and confusion.       Objective:      Physical Exam   Constitutional: Vital signs are normal. She appears well-developed and well-nourished. No distress.   HENT:   Head: Normocephalic and atraumatic.   Right Ear: Hearing, tympanic membrane, external ear and ear canal normal.   Left Ear: Hearing, tympanic membrane, external ear and ear canal normal.   Nose: Rhinorrhea present.   Mouth/Throat: Uvula is midline, oropharynx is clear and moist and mucous membranes are normal.   Cardiovascular: Normal rate, regular rhythm, S1 normal, S2 normal and normal heart sounds.  Exam reveals no gallop.    No murmur heard.  Pulses:       Radial pulses are 2+ on the right side, and 2+ on the left side.         Dorsalis pedis pulses are 2+ on the right side, and 2+ on the left side.        Posterior tibial pulses are 2+ on the right side, and 2+ on the left side.   <2sec cap refill fingers bilat     Pulmonary/Chest: Effort normal and breath sounds normal. No respiratory distress. She has no wheezes. She has no rhonchi.   Feet:   Right Foot:   Protective Sensation: 7 sites tested. 7 sites sensed.   Skin Integrity: Negative for ulcer, blister, skin breakdown, erythema, warmth, callus or dry skin.   Left Foot:   Protective Sensation: 7 sites tested. 7 sites sensed.   Skin Integrity: Negative for ulcer, blister, skin breakdown, erythema, warmth, callus or dry skin.   Skin: Skin is warm and dry. She is not diaphoretic.   Appropriate skin turgor   Psychiatric: She has a normal mood and affect. Her speech is normal and behavior is normal. Judgment and thought content normal. Cognition and memory are normal.       Assessment:       1. Uncontrolled type 2 diabetes mellitus with complication, without long-term current use of insulin    2. Sore nose    3. Acute on chronic congestive heart failure, unspecified congestive heart failure type    4. Need for pneumococcal vaccination    5. Obesity, Class I, BMI 30-34.9        Plan:   Eva was seen today for follow-up.    Diagnoses and all orders for this visit:    Uncontrolled type 2 diabetes mellitus with complication, without long-term current use of insulin  -     Cancel: MICROALBUMIN / CREATININE RATIO URINE  -     Hemoglobin A1c; Future  -     MICROALBUMIN / CREATININE RATIO URINE    Sore nose  -     mupirocin (BACTROBAN) 2 % ointment; Apply topically 3 (three) times daily.    Acute on chronic congestive heart failure, unspecified congestive heart failure type  -     Brain natriuretic peptide; Future    Need for pneumococcal vaccination  -     Pneumococcal Conjugate Vaccine (13 Valent) (IM)    Obesity, Class I, BMI 30-34.9  Patient readiness: acceptance and barriers:none    During the  course of the visit the patient was educated and counseled about the following:     Diabetes:  Discussed general issues about diabetes pathophysiology and management.  Hypertension:   Medication: no change.  Obesity:   Regular aerobic exercise program discussed.    Goals: Diabetes: Maintain Hemoglobin A1C below 7, Hypertension: Reduce Blood Pressure and Obesity: Reduce calorie intake and BMI    Did patient meet goals/outcomes: No    The following self management tools provided: declined    Patient Instructions (the written plan) was given to the patient/family.     Time spent with patient: 30 minutes

## 2017-05-26 NOTE — PROGRESS NOTES
Pre-Visit Chart Review  For Appointment Scheduled on 05/26/17    Health Maintenance Due   Topic Date Due    Foot Exam  09/04/1955    TETANUS VACCINE  09/04/1963    Zoster Vaccine  09/04/2005    Pneumococcal (65+) (1 of 2 - PCV13) 09/04/2010    Mammogram  03/03/2016    Urine Microalbumin  10/12/2016    DEXA SCAN  03/03/2017    Hemoglobin A1c  03/05/2017

## 2017-05-26 NOTE — PROGRESS NOTES
Patient identified using name and . VIS sheet was given and procedure was explained. Prevnar 13 was administered IM in the right deltoid using sterile technique. No residual bleeding was noted at the injection site. Patient tolerated procedure well.

## 2017-05-26 NOTE — PATIENT INSTRUCTIONS
Established High Blood Pressure    High blood pressure (hypertension) is a chronic disease. Often health care providers dont know what causes it. But it can be caused by certain health conditions and medicines.  If you have high blood pressure, you may not have any symptoms. If you do have symptoms, they may include headache, dizziness, changes in your vision, chest pain, and shortness of breath. But even without symptoms, high blood pressure thats not treated raises your risk for heart attack and stroke. High blood pressure is a serious health risk and shouldnt be ignored.  A blood pressure reading is made up of two numbers: a higher number over a lower number. The top number is the systolic pressure. The bottom number is the diastolic pressure. A normal blood pressure is less than 120 over less than 80.  High blood pressure is when either the top number is 140 or higher, or the bottom number is 90 or higher. This must be the result when taking your blood pressure a number of times. The blood pressures between normal and high are called prehypertension.  Home care  If you have high blood pressure, you should do what is listed below to lower your blood pressure. If you are taking medicines for high blood pressure, these methods may reduce or end your need for medicines in the future.  · Begin a weight-loss program if you are overweight.  · Cut back on how much salt you get in your diet. Heres how to do this:  ¨ Dont eat foods that have a lot of salt. These include olives, pickles, smoked meats, and salted potato chips.  ¨ Dont add salt to your food at the table.  ¨ Use only small amounts of salt when cooking.  · Begin an exercise program. Talk with your health care provider about the type of exercise program that would be best for you. It doesn't have to be hard. Even brisk walking for 20 minutes 3 times a week is a good form of exercise.  · Dont take medicines that have heart stimulants. This includes many  cold and sinus decongestant pills and sprays, as well as diet pills. Check the warnings about hypertension on the label. Stimulants such as amphetamine or cocaine could be lethal for someone with high blood pressure. Never take these.  · Limit how much caffeine you get in your diet. Switch to caffeine-free products.  · Stop smoking. If you are a long-time smoker, this can be hard. Enroll in a stop-smoking program to make it more likely that you will quit for good.  · Learn how to handle stress. This is an important part of any program to lower blood pressure. Learn about relaxation methods like meditation, yoga, or biofeedback.  · If your provider prescribed medicines, take them exactly as directed. Missing doses may cause your blood pressure get out of control.  · Consider buying an automatic blood pressure machine. You can get one of these at most pharmacies. Use this to watch your blood pressure at home. Give the results to your provider.  Follow-up care  You will need to make regular visits to your health care provider. This is to check your blood pressure and to make changes to your medicines. Make a follow-up appointment as directed.  When to seek medical advice  Call your health care provider right away if any of these occur:  · Chest pain or shortness of breath  · Severe headache  · Throbbing or rushing sound in the ears  · Nosebleed  · Sudden severe pain in your belly (abdomen)  · Extreme drowsiness, confusion, or fainting  · Dizziness or dizziness with a spinning sensation (vertigo)  · Weakness of an arm or leg or one side of the face  · You have problems speaking or seeing   Date Last Reviewed: 11/25/2014  © 0410-2031 [employer]. [address] [place] [address], [place] [address]. All rights reserved. This information is not intended as a substitute for professional medical care. Always follow your healthcare professional's instructions.        Diabetes (General Information)  Diabetes is a long-term  health problem. It means your body does not make enough insulin. Or it may mean that your body cannot use the insulin it makes. Insulin is a hormone in your body. It lets blood sugar (glucose) reach the cells in your body. All of your cells need glucose for fuel.  When you have diabetes, the glucose in your blood builds up because it cannot get into the cells. This buildup is called high blood sugar (hyperglycemia).  Your blood sugar level depends on several things. It depends on what kind of food you eat and how much of it you eat. It also depends on how much exercise you get, and how much insulin you have in your body. Eating too much of the wrong kinds of food or not taking diabetes medicine on time can cause high blood sugar. Infections can cause high blood sugar even if you are taking medicines correctly.  These things can also cause low blood sugar:  · Missing meals  · Not eating enough food  · Taking too much diabetes medicine  Diabetes can cause serious problems over time if you do not get treated. These problems include heart disease, stroke, kidney failure, and blindness. They also include nerve pain or loss of feeling in your legs and feet, and gangrene of the feet. By keeping your blood sugar under control you can prevent or delay these problems.  Normal blood sugar levels are 80 to 100 before a meal and less than 180 in the 1 to 2 hours after a meal.  Home care  Follow these guidelines when caring for yourself at home:  · Follow the diet your healthcare provider gives you. Take insulin or other diabetes medicine exactly as told to.  · Watch your blood sugar as you are told to. Keep a log of your results. This will help your provider change your medicines to keep your blood sugar under control.  · Try to reach your ideal weight. You may be able to cut back on or not have to take diabetes medicine if you eat the right foods and get exercise.  · Do not smoke. Smoking worsens the effects of diabetes on your  circulation. You are much more likely to have a heart attack if you have diabetes and you smoke.  · Take good care of your feet. If you have lost feeling in your feet, you may not see an injury or infection. Check your feet and between your toes at least once a week.  · Wear a medical alert bracelet or necklace, or carry a card in your wallet that says you have diabetes. This will help healthcare providers give you the right care if you get very ill and cannot tell them that you have diabetes.  Sick day plan  If you get a cold, the flu, or a bacterial or viral infection, take these steps:  · Look at your diabetes sick plan and call your healthcare provider as you were told to. You may need to call your provider right away if:  ¨ Your blood sugar is above 240 while taking your diabetes medicine  ¨ Your urine ketone levels are above normal or high  ¨ You have been vomiting more than 6 hours  ¨ You have trouble breathing or your breath ha s a fruity smell  ¨ You have a high fever  ¨ You have a fever for several days and you are not getting better  ¨ You get light-headed and are sleepier than usual  · Keep taking your diabetes pills (oral medicine) even if you have been vomiting and are feeling sick. Call your provider right away because you may need insulin to lower your blood sugar until you recover from your illness.  · Keep taking your insulin even if you have been vomiting and are feeling sick. Call your provider right away to ask if you need to change your insulin dose. This will depend on your blood sugar results.  · Check your blood sugar every 2 to 4 hours, or at least 4 times a day.  · Check your ketones often. If you are vomiting and having diarrhea, watch them more often.  · Do not skip meals. Try to eat small meals on a regular schedule. Do this even if you do not feel like eating.  · Drink water or other liquids that do not have caffeine or calories. This will keep you from getting dehydrated. If you are  nauseated or vomiting, takes small sips every 5 minutes. To prevent dehydration try to drink a cup (8 ounces) of fluids every hour while you are awake.  General care  Always bring a source of fast-acting sugar with you in case you have symptoms of low blood sugar (below 70). At the first sign of low blood sugar, eat or drink 15 to 20 grams of fast-acting sugar to raise your blood sugar. Examples are:  · 3 to 4 glucose tablets. You can buy these at most [facility].  · 4 ounces (1/2 cup) of regular (not diet) soft drinks  · 4 ounces (1/2 cup) of any fruit juice  · 8 ounces (1 cup) of milk  · 5 to 6 pieces of hard candy  · 1 tablespoon of honey  Check your blood sugar 15 minutes after treating yourself. If it is still below 70, take 15 to 20 more grams of fast-acting sugar. Test again in 15 minutes. If it returns to normal (70 or above), eat a snack or meal to keep your blood sugar in a safe range. If it stays low, call your doctor or go to an emergency room.  Follow-up care  Follow-up with your healthcare provider, or as advised. For more information about diabetes, visit the American Diabetes Association website at www.diabetes.org or call 788-165-2478.  When to seek medical advice  Call your healthcare provider right away if you have any of these symptoms of high blood sugar:  · Frequent urination  · Dizziness  · Drowsiness  · Thirst  · Headache  · Nausea or vomiting  · Abdominal pain  · Eyesight changes  · Fast breathing  · Confusion or loss of consciousness  Also call your provider right away if you have any of these signs of low blood sugar:  · Fatigue  · Headache  · Shakes  · Excess sweating  · Hunger  · Feeling anxious or restless  · Eyesight changes  · Drowsiness  · Weakness  · Confusion or loss of consciousness  Call 911  Call for emergency help right away if any of these occur:  · Chest pain or shortness of breath  · Dizziness or fainting  · Weakness of an arm or leg or one side of the face  · Trouble  speaking or seeing   Date Last Reviewed: 6/1/2016 © 2000-2016 [employer]. [address] [place] [address], [place] [address]. All rights reserved. This information is not intended as a substitute for professional medical care. Always follow your healthcare professional's instructions.        Weight Management: Getting Started  Healthy bodies come in all shapes and sizes. Not all bodies are made to be thin. For some people, a healthy weight is higher than the average weight listed on weight charts. Your healthcare provider can help you decide on a healthy weight for you.    Reasons to lose weight  Losing weight can help with some health problems, such as high blood pressure, heart disease, diabetes, sleep apnea, and arthritis. You may also feel more energy.  Set your long-term goal  Your goal doesn't even have to be a specific weight. You may decide on a fitness goal (such as being able to walk 10 miles a week), or a health goal (such as lowering your blood pressure). Choose a goal that is measurable and reasonable, so you know when you've reached it. A goal of reaching a BMI of less than 25 is not always reasonable (or possible).   Make an action plan  Habits dont change overnight. Setting your goals too high can leave you feeling discouraged if you cant reach them. Be realistic. Choose one or two small changes you can make now. Set an action plan for how you are going to make these changes. When you can stick to this plan, keep making a few more small changes. Taking small steps will help you stay on the path to success.  Track your progress  Write down your goals. Then, keep a daily record of your progress. Write down what you eat and how active you are. This record lets you look back on how much youve done. It may also help when youre feeling frustrated. Reward yourself for success. Even if you dont reach every goal, give yourself credit for what you do get done.  Get support  Encouragement from  others can help make losing weight easier. Ask your family members and friends for support. They may even want to join you. Also look to your healthcare provider, registered dietitian, and  for help. Your local hospital can give you more information about nutrition, exercise, and weight loss.  Date Last Reviewed: 1/31/2016  © 5302-1846 [employer]. [address] [place] [address], [place] [address]. All rights reserved. This information is not intended as a substitute for professional medical care. Always follow your healthcare professional's instructions.        Walking for Fitness  Fitness walking has something for everyone, even people who are already fit. Walking is one of the safest ways to condition your body aerobically. It can boost energy, help you lose weight, and reduce stress.    Physical benefits  · Walking strengthens your heart and lungs, and tones your muscles.  · When walking, your feet land with less impact than in other sports. This reduces chances of muscle, bone, and joint injury.  · Regular walking improves your cholesterol levels and lowers your risk of heart disease. And it helps you control your blood sugar if you have diabetes.  · Walking is a weight-bearing activity, which helps maintain bone density. This can help prevent osteoporosis.  Personal rewards  · Taking walks can help you relax and manage stress. And fitness walking may make you feel better about yourself.  · Walking can help you sleep better at night and make you less likely to be depressed.  · Regular walking may help maintain your memory as you get older.  · Walking is a great way to spend extra time with friends and family members. Be sure to invite your dog along!  Q&A about fitness walking  Q: Will walking keep me fit?  A: Yes. Regular walking at the right pace gives you all the benefits of other aerobic activities, such as jogging and swimming.  Q: Will walking help me lose weight and keep it  off?  A: Yes. Per mile, walking can burn as many calories as jogging. Your health care provider can help work walking into your weight-loss plan.  Q: Is walking safe for my health?  A: Yes. Walking is safe if you have high blood pressure, diabetes, heart disease, or other conditions. Talk to your health care provider before you start.  Date Last Reviewed: 5/9/2015  © 2853-3800 [employer]. [address] [place] [address], [place] [address]. All rights reserved. This information is not intended as a substitute for professional medical care. Always follow your healthcare professional's instructions.

## 2017-05-27 LAB
ESTIMATED AVG GLUCOSE: 166 MG/DL
HBA1C MFR BLD HPLC: 7.4 %

## 2017-05-28 DIAGNOSIS — E11.8 TYPE 2 DIABETES MELLITUS WITH COMPLICATION, WITHOUT LONG-TERM CURRENT USE OF INSULIN: Primary | ICD-10-CM

## 2017-05-29 RX ORDER — METFORMIN HYDROCHLORIDE 500 MG/1
1000 TABLET ORAL 2 TIMES DAILY WITH MEALS
Qty: 360 TABLET | Refills: 1 | Status: SHIPPED | OUTPATIENT
Start: 2017-05-29 | End: 2017-09-28 | Stop reason: DRUGHIGH

## 2017-06-06 ENCOUNTER — ANTI-COAG VISIT (OUTPATIENT)
Dept: CARDIOLOGY | Facility: CLINIC | Age: 72
End: 2017-06-06
Payer: MEDICARE

## 2017-06-06 DIAGNOSIS — I48.91 ATRIAL FIBRILLATION, UNSPECIFIED TYPE: ICD-10-CM

## 2017-06-06 DIAGNOSIS — Z79.01 LONG TERM CURRENT USE OF ANTICOAGULANT THERAPY: Primary | ICD-10-CM

## 2017-06-06 LAB — INR PPP: 2.4 (ref 2–3)

## 2017-06-06 PROCEDURE — 85610 PROTHROMBIN TIME: CPT | Mod: QW,S$GLB,, | Performed by: PHARMACIST

## 2017-07-11 ENCOUNTER — ANTI-COAG VISIT (OUTPATIENT)
Dept: CARDIOLOGY | Facility: CLINIC | Age: 72
End: 2017-07-11
Payer: MEDICARE

## 2017-07-11 DIAGNOSIS — Z79.01 LONG TERM CURRENT USE OF ANTICOAGULANT THERAPY: Primary | ICD-10-CM

## 2017-07-11 LAB — INR PPP: 2.3 (ref 2–3)

## 2017-07-11 PROCEDURE — 85610 PROTHROMBIN TIME: CPT | Mod: QW,S$GLB,, | Performed by: PHARMACIST

## 2017-07-11 NOTE — PROGRESS NOTES
INR in range.  Pt denies any changes in meds/diet/health.  Will continue dose and recheck in 6 weeks

## 2017-07-24 ENCOUNTER — TELEPHONE (OUTPATIENT)
Dept: CARDIOLOGY | Facility: CLINIC | Age: 72
End: 2017-07-24

## 2017-07-24 NOTE — TELEPHONE ENCOUNTER
Spoke to patient.  She stated that she has no outward swelling or edema to legs , ankles or feet.  No swelling in her arms or hands.   Patient is taking her lasix daily.  Not having difficulty with her thyroid and or diabetic medication.  Patient stated that she is hydrating herself regularly.     Asked patient to call and make an appointment with her PCP or the NP or PA for an urgent appointment if she is continuing with Shortness of breath.  If she continues to have shortness of breath instructed patient to go to the emergency department.    Patient has an 10/2017 appointment for Dr Cleveland.  Dr Cleveladn is out of the office for 2 months.   Patient stated that she understands and agrees.

## 2017-07-24 NOTE — TELEPHONE ENCOUNTER
----- Message from Ivania Mcfadden sent at 7/24/2017 11:03 AM CDT -----  Patient requesting same day appointment due to shortness of breath and trouble sleeping. Please call patient at [phone]. Thank you.

## 2017-07-25 ENCOUNTER — TELEPHONE (OUTPATIENT)
Dept: ADMINISTRATIVE | Facility: HOSPITAL | Age: 72
End: 2017-07-25

## 2017-07-25 ENCOUNTER — HOSPITAL ENCOUNTER (EMERGENCY)
Facility: HOSPITAL | Age: 72
Discharge: HOME | End: 2017-07-25
Attending: EMERGENCY MEDICINE
Payer: MEDICARE

## 2017-07-25 VITALS
TEMPERATURE: 97 F | SYSTOLIC BLOOD PRESSURE: 156 MMHG | RESPIRATION RATE: 19 BRPM | DIASTOLIC BLOOD PRESSURE: 70 MMHG | BODY MASS INDEX: 30.45 KG/M2 | HEART RATE: 91 BPM | WEIGHT: 183 LBS | OXYGEN SATURATION: 97 %

## 2017-07-25 DIAGNOSIS — I50.9 ACUTE ON CHRONIC CONGESTIVE HEART FAILURE, UNSPECIFIED CONGESTIVE HEART FAILURE TYPE: Primary | ICD-10-CM

## 2017-07-25 LAB
ALBUMIN SERPL BCP-MCNC: 4.4 G/DL
ALP SERPL-CCNC: 103 U/L
ALT SERPL W/O P-5'-P-CCNC: 36 U/L
ANION GAP SERPL CALC-SCNC: 13 MMOL/L
AST SERPL-CCNC: 34 U/L
BASOPHILS # BLD AUTO: 0 K/UL
BASOPHILS NFR BLD: 0.4 %
BILIRUB SERPL-MCNC: 0.6 MG/DL
BNP SERPL-MCNC: 900 PG/ML
BUN SERPL-MCNC: 17 MG/DL
CALCIUM SERPL-MCNC: 10.3 MG/DL
CHLORIDE SERPL-SCNC: 98 MMOL/L
CO2 SERPL-SCNC: 31 MMOL/L
CREAT SERPL-MCNC: 0.9 MG/DL
DIFFERENTIAL METHOD: ABNORMAL
EOSINOPHIL # BLD AUTO: 0.2 K/UL
EOSINOPHIL NFR BLD: 2.7 %
ERYTHROCYTE [DISTWIDTH] IN BLOOD BY AUTOMATED COUNT: 16.2 %
EST. GFR  (AFRICAN AMERICAN): >60 ML/MIN/1.73 M^2
EST. GFR  (NON AFRICAN AMERICAN): >60 ML/MIN/1.73 M^2
GLUCOSE SERPL-MCNC: 154 MG/DL
HCT VFR BLD AUTO: 39.6 %
HGB BLD-MCNC: 12.9 G/DL
INR PPP: 2.2
LYMPHOCYTES # BLD AUTO: 2.1 K/UL
LYMPHOCYTES NFR BLD: 25.6 %
MCH RBC QN AUTO: 26.8 PG
MCHC RBC AUTO-ENTMCNC: 32.6 G/DL
MCV RBC AUTO: 82 FL
MONOCYTES # BLD AUTO: 0.6 K/UL
MONOCYTES NFR BLD: 7.2 %
NEUTROPHILS # BLD AUTO: 5.2 K/UL
NEUTROPHILS NFR BLD: 64.1 %
PLATELET # BLD AUTO: 298 K/UL
PMV BLD AUTO: 8.4 FL
POTASSIUM SERPL-SCNC: 3.7 MMOL/L
PROT SERPL-MCNC: 8.3 G/DL
PROTHROMBIN TIME: 22.6 SEC
RBC # BLD AUTO: 4.82 M/UL
SODIUM SERPL-SCNC: 142 MMOL/L
TROPONIN I SERPL DL<=0.01 NG/ML-MCNC: 0.02 NG/ML
WBC # BLD AUTO: 8.2 K/UL

## 2017-07-25 PROCEDURE — 63600175 PHARM REV CODE 636 W HCPCS: Performed by: EMERGENCY MEDICINE

## 2017-07-25 PROCEDURE — 93005 ELECTROCARDIOGRAM TRACING: CPT

## 2017-07-25 PROCEDURE — 83880 ASSAY OF NATRIURETIC PEPTIDE: CPT

## 2017-07-25 PROCEDURE — 36415 COLL VENOUS BLD VENIPUNCTURE: CPT

## 2017-07-25 PROCEDURE — 84484 ASSAY OF TROPONIN QUANT: CPT

## 2017-07-25 PROCEDURE — 99284 EMERGENCY DEPT VISIT MOD MDM: CPT | Mod: 25

## 2017-07-25 PROCEDURE — 80053 COMPREHEN METABOLIC PANEL: CPT

## 2017-07-25 PROCEDURE — 96374 THER/PROPH/DIAG INJ IV PUSH: CPT

## 2017-07-25 PROCEDURE — 85610 PROTHROMBIN TIME: CPT

## 2017-07-25 PROCEDURE — 85025 COMPLETE CBC W/AUTO DIFF WBC: CPT

## 2017-07-25 RX ORDER — FUROSEMIDE 10 MG/ML
40 INJECTION INTRAMUSCULAR; INTRAVENOUS
Status: COMPLETED | OUTPATIENT
Start: 2017-07-25 | End: 2017-07-25

## 2017-07-25 RX ORDER — FUROSEMIDE 40 MG/1
TABLET ORAL
Qty: 120 TABLET | Refills: 0 | Status: SHIPPED | OUTPATIENT
Start: 2017-07-25 | End: 2018-02-27 | Stop reason: ALTCHOICE

## 2017-07-25 RX ADMIN — FUROSEMIDE 40 MG: 10 INJECTION, SOLUTION INTRAMUSCULAR; INTRAVENOUS at 11:07

## 2017-07-25 NOTE — TELEPHONE ENCOUNTER
Call transferred from phone room/[facility].  Patient stated that she is very short of breathe and has not been able to lay flat for 3 night. She was not able to sleep with out being elevated and  last night it got worse.  She was so short of breathe in her recliner she could not sleep.  After reviewing chart I instructed her to go to the ED due to her history of CHF and other cardiac issues.  She confirmed understanding and was go to the ED.  Spoke with Dr. Peterson and she agree with my decision.

## 2017-07-25 NOTE — TELEPHONE ENCOUNTER
----- Message from Kavitha Wilson sent at 7/25/2017  9:30 AM CDT -----  Called pod and nurse took call.  Patient states that she has shortness of breathe.

## 2017-07-25 NOTE — ED NOTES
Pt here for eval of SOB that has been worsening x1 week, denies CP. No swelling noted to extremities. Pt wears oxygen at night. Pt she has hx of CHF and is currently taking lasix. Denies fever, n/v/d. Reports producitve cough. resp even and unlabored. Breath sounds clear bilat. Skin warm dry and intact. Pt placed on cardiac monitor, nibp and continuous pulse ox.

## 2017-07-25 NOTE — ED PROVIDER NOTES
Encounter Date: 7/25/2017    SCRIBE #1 NOTE: ITiki, jenny scribing for, and in the presence of, Dr. Humphrey.       History     Chief Complaint   Patient presents with    Shortness of Breath     x 1 week       07/25/2017 11:25 AM     Chief Complaint: SOB      Eva Corona is a 71 y.o. female with a history of HTN, DM, CHF, and SOB on exertion who presents to the ED with complaint of SOB since a week ago. SOB is associated with coughing up phlegm and leg swelling. Pt reports difficulty sleeping secondary to SOB. She denies fever. Pt called her cardiologist Dr. Cleveland, was informed he is out of town, and was told to present to the ED for further evaluation. She denies tobacco use and a PMHx of COPD and Pneumonia. Allergend include Codeine, Morphine, and Neuromuscular blockers. Pt reports eating salty food recently.           The history is provided by the patient.     Review of patient's allergies indicates:   Allergen Reactions    Codeine Nausea And Vomiting    Neuromuscular blockers, steroidal      Other reaction(s): Unknown    Morphine Nausea And Vomiting     Past Medical History:   Diagnosis Date    Anticoagulant long-term use     Atrial fibrillation     Cataract     CHF (congestive heart failure)     Diabetes mellitus     Encounter for blood transfusion     Hyperlipidemia     Hypertension     On home oxygen therapy     2L, nightly    Shortness of breath on exertion     Thyroid disease      Past Surgical History:   Procedure Laterality Date    CARDIAC CATHETERIZATION Right     CARDIAC ELECTROPHYSIOLOGY MAPPING AND ABLATION      COLONOSCOPY      EYE SURGERY Bilateral     cataract, implants    HYSTERECTOMY      TVH, ovaries intact, benign     Family History   Problem Relation Age of Onset    Diabetes Mother     Cancer Neg Hx      Social History   Substance Use Topics    Smoking status: Never Smoker    Smokeless tobacco: Never Used    Alcohol use No     Review of Systems    Constitutional: Negative for fever.   HENT: Negative for sore throat.    Respiratory: Positive for cough (with phlegm) and shortness of breath.    Cardiovascular: Positive for leg swelling. Negative for chest pain.   Gastrointestinal: Negative for nausea.   Genitourinary: Negative for dysuria.   Musculoskeletal: Negative for back pain.   Skin: Negative for rash.   Neurological: Negative for weakness.   Hematological: Does not bruise/bleed easily.       Physical Exam     Initial Vitals [07/25/17 1114]   BP Pulse Resp Temp SpO2   136/61 95 19 97.3 °F (36.3 °C) (!) 88 %      MAP       86         Physical Exam    Nursing note and vitals reviewed.  Constitutional: She appears well-developed.   HENT:   Head: Normocephalic and atraumatic.   Eyes: EOM are normal. Pupils are equal, round, and reactive to light.   Neck: Neck supple.   Tightening to the JVD.   Cardiovascular: Normal rate, regular rhythm and intact distal pulses. Exam reveals gallop and S3.    No murmur heard.  2+ pitting edema to bilateral anterior leg.   Pulmonary/Chest: She has no decreased breath sounds. She has rales.   Rales at the bases left more than right.   Abdominal: Soft. Bowel sounds are normal. She exhibits no distension.   Musculoskeletal: Normal range of motion.   Neurological: She is alert and oriented to person, place, and time.   Skin: Skin is warm and dry.   Psychiatric: She has a normal mood and affect.         ED Course   Procedures  Labs Reviewed - No data to display          Medical Decision Making:   Patient appears to have a CHF exacerbation.  It is very mild.  I feel that she stable for discharge.  I spoke with , who can see her in clinic this week.  Her EKG shows no signs of acute ischemia.  I will increase her Lasix to 80 mg in the morning and 40 mg in the evening.  Her creatinine is stable.  Her troponin is within normal limits.            Scribe Attestation:   Scribe #1: I performed the above scribed service and the  documentation accurately describes the services I performed. I attest to the accuracy of the note.    Attending Attestation:           Physician Attestation for Scribe:  Physician Attestation Statement for Scribe #1: I, Dr. Humphrey, reviewed documentation, as scribed by Tiki Faustin in my presence, and it is both accurate and complete.                 ED Course     Clinical Impression:   The encounter diagnosis was Acute on chronic congestive heart failure, unspecified congestive heart failure type.                           Deep Humphrey MD  07/25/17 0854

## 2017-08-01 ENCOUNTER — OFFICE VISIT (OUTPATIENT)
Dept: CARDIOLOGY | Facility: CLINIC | Age: 72
End: 2017-08-01
Payer: MEDICARE

## 2017-08-01 VITALS
HEIGHT: 65 IN | WEIGHT: 183.88 LBS | SYSTOLIC BLOOD PRESSURE: 152 MMHG | DIASTOLIC BLOOD PRESSURE: 67 MMHG | HEART RATE: 101 BPM | OXYGEN SATURATION: 94 % | BODY MASS INDEX: 30.64 KG/M2

## 2017-08-01 DIAGNOSIS — I48.0 PAROXYSMAL ATRIAL FIBRILLATION: ICD-10-CM

## 2017-08-01 DIAGNOSIS — I42.9 CARDIOMYOPATHY, UNSPECIFIED TYPE: Primary | ICD-10-CM

## 2017-08-01 DIAGNOSIS — I15.9 SECONDARY HYPERTENSION: ICD-10-CM

## 2017-08-01 PROCEDURE — 99213 OFFICE O/P EST LOW 20 MIN: CPT | Mod: S$GLB,,, | Performed by: INTERNAL MEDICINE

## 2017-08-01 PROCEDURE — 99499 UNLISTED E&M SERVICE: CPT | Mod: S$GLB,,, | Performed by: INTERNAL MEDICINE

## 2017-08-01 PROCEDURE — 93000 ELECTROCARDIOGRAM COMPLETE: CPT | Mod: S$GLB,,, | Performed by: INTERNAL MEDICINE

## 2017-08-01 PROCEDURE — 99999 PR PBB SHADOW E&M-EST. PATIENT-LVL III: CPT | Mod: PBBFAC,,, | Performed by: INTERNAL MEDICINE

## 2017-08-01 PROCEDURE — 1126F AMNT PAIN NOTED NONE PRSNT: CPT | Mod: S$GLB,,, | Performed by: INTERNAL MEDICINE

## 2017-08-01 PROCEDURE — 1159F MED LIST DOCD IN RCRD: CPT | Mod: S$GLB,,, | Performed by: INTERNAL MEDICINE

## 2017-08-01 RX ORDER — LOSARTAN POTASSIUM 50 MG/1
50 TABLET ORAL DAILY
Qty: 90 TABLET | Refills: 0 | Status: SHIPPED | OUTPATIENT
Start: 2017-08-01 | End: 2017-10-25 | Stop reason: SDUPTHER

## 2017-08-01 NOTE — PATIENT INSTRUCTIONS
1reduce lasix to 40 mg am and 40 mg pm.  2.Loasartan 50 mg  3.ECHO in October and follow with Dr Cleveland.

## 2017-08-01 NOTE — PROGRESS NOTES
[facility]    CC: Follow-up from a hospital visit  Chief Complaint   Patient presents with    Follow-up     Hospital F/U       Patient ID: Eva Corona is a 71 y.o. female with a past medical history of atrial fibrillation status post ablation, cardiomyopathy.    HPI  She was recently in the emergency room where she presented with worsening shortness of breath.  Her Lasix was increased from a regular 40 mg twice a day to 80 mg in the morning and 40 mg at night.    Has done well with increase dose of Lasix.  She was eating out a lot before this episode.  I believe the increased salt in her diet led to this decompensation    Past Medical History:   Diagnosis Date    Anticoagulant long-term use     Atrial fibrillation     Cataract     CHF (congestive heart failure)     Diabetes mellitus     Encounter for blood transfusion     Hyperlipidemia     Hypertension     On home oxygen therapy     2L, nightly    Shortness of breath on exertion     Thyroid disease      Past Surgical History:   Procedure Laterality Date    CARDIAC CATHETERIZATION Right     CARDIAC ELECTROPHYSIOLOGY MAPPING AND ABLATION      COLONOSCOPY      EYE SURGERY Bilateral     cataract, implants    HYSTERECTOMY      TVH, ovaries intact, benign     Social History     Social History    Marital status:      Spouse name: N/A    Number of children: N/A    Years of education: N/A     Occupational History    Not on file.     Social History Main Topics    Smoking status: Never Smoker    Smokeless tobacco: Never Used    Alcohol use No    Drug use: No    Sexual activity: No     Other Topics Concern    Not on file     Social History Narrative    No narrative on file     Family History   Problem Relation Age of Onset    Diabetes Mother     Cancer Neg Hx        Review of patient's allergies indicates:   Allergen Reactions    Codeine Nausea And Vomiting    Neuromuscular blockers, steroidal      Other reaction(s):  Unknown    Morphine Nausea And Vomiting       Medication List with Changes/Refills   New Medications    LOSARTAN (COZAAR) 50 MG TABLET    Take 1 tablet (50 mg total) by mouth once daily.   Current Medications    ACETAMINOPHEN (TYLENOL) 325 MG TABLET    Take 2 tablets (650 mg total) by mouth every 6 (six) hours as needed for Pain.    AMLODIPINE (NORVASC) 5 MG TABLET    TAKE 1 TABLET (5 MG TOTAL) BY MOUTH ONCE DAILY.    ASCORBIC ACID (VITAMIN C) 1000 MG TABLET    Take 1,000 mg by mouth once daily.    ATORVASTATIN (LIPITOR) 10 MG TABLET    TAKE 1 TABLET (10 MG TOTAL) BY MOUTH ONCE DAILY.    COCONUT OIL ORAL    Take by mouth.    FLUTICASONE (FLONASE) 50 MCG/ACTUATION NASAL SPRAY    2 sprays by Each Nare route every evening.    FUROSEMIDE (LASIX) 40 MG TABLET    Take 80 mg in the morning and 40 mg in the evening.    GABAPENTIN (NEURONTIN) 300 MG CAPSULE    TAKE 2 CAPSULES (600 MG TOTAL) BY MOUTH 3 (THREE) TIMES DAILY.    GLIMEPIRIDE (AMARYL) 4 MG TABLET    TAKE 2 TABLETS EVERY DAY WITH BREAKFAST    LANSOPRAZOLE (PREVACID SOLUTAB) 30 MG DISINTEGRATING TABLET    Take 30 mg by mouth once daily.    LEVOTHYROXINE (SYNTHROID) 75 MCG TABLET    TAKE 1 TABLET EVERY DAY  BEFORE  BREAKFAST    METFORMIN (GLUCOPHAGE) 500 MG TABLET    Take 2 tablets (1,000 mg total) by mouth 2 (two) times daily with meals.    METOPROLOL SUCCINATE (TOPROL-XL) 100 MG 24 HR TABLET    Take 1 tablet (100 mg total) by mouth once daily.    MULTIVIT-MIN/FA/CA CARB/VIT K (ONE-A-DAY WOMEN'S 50+ ORAL)    Take 1 tablet by mouth once daily.     POTASSIUM CHLORIDE SA (K-DUR,KLOR-CON) 20 MEQ TABLET    Take 1 tablet (20 mEq total) by mouth once daily.    VITAMIN A ORAL    Take 1 tablet by mouth once daily.     WARFARIN (COUMADIN) 5 MG TABLET    1.5 tablets (7.5 mg) PO on Mon, Wed, Fri.  1 tablet PO (5 mg) all other days.       Review of Systems  Constitution: Denies chills, fever, and sweats.  HENT: Denies headaches or blurry vision.  Cardiovascular: Denies chest  "pain or irregular heart beat.  Respiratory: Denies cough or shortness of breath.  Gastrointestinal: Denies abdominal pain, nausea, or vomiting.  Musculoskeletal: Denies muscle cramps.  Neurological: Denies dizziness or focal weakness.  Psychiatric/Behavioral: Normal mental status.  Hematologic/Lymphatic: Denies bleeding problem or easy bruising/bleeding.  Skin: Denies rash or suspicious lesions    Physical Examination  BP (!) 152/67 (BP Location: Left arm, Patient Position: Sitting)   Pulse 101   Ht 5' 5" (1.651 m)   Wt 83.4 kg (183 lb 13.8 oz)   LMP  (LMP Unknown)   SpO2 (!) 94%   BMI 30.60 kg/m²     Constitutional: No acute distress, conversant  HEENT: Sclera anicteric, Pupils equal, round and reactive to light, extraocular motions intact, Oropharynx clear  Neck: No JVD, no carotid bruits  Cardiovascular: regular rate and rhythm, no murmur, rubs or gallops, normal S1/S2  Pulmonary: Clear to auscultation bilaterally  Abdominal: Abdomen soft, nontender, nondistended, positive bowel sounds  Extremities: No lower extremity edema,   Pulses:  Carotid pulses are 2+ on the right side, and 2+ on the left side.  Radial pulses are 2+ on the right side, and 2+ on the left side.   Femoral pulses are 2+ on the right side, and 2+ on the left side.  .    Skin: No ecchymosis, erythema, or ulcers  Psych: Alert and oriented x 3, appropriate affect  Neuro: CNII-XII intact, no focal deficits    Labs:  Most Recent Data  CBC:   Lab Results   Component Value Date    WBC 8.20 07/25/2017    HGB 12.9 07/25/2017    HCT 39.6 07/25/2017     07/25/2017    MCV 82 07/25/2017    RDW 16.2 (H) 07/25/2017     BMP:   Lab Results   Component Value Date     07/25/2017    K 3.7 07/25/2017    CL 98 07/25/2017    CO2 31 (H) 07/25/2017    BUN 17 07/25/2017    CREATININE 0.9 07/25/2017     (H) 07/25/2017    CALCIUM 10.3 07/25/2017    MG 2.0 06/23/2015    PHOS 3.7 03/31/2015     LFTS;   Lab Results   Component Value Date    PROT 8.3 " 07/25/2017    ALBUMIN 4.4 07/25/2017    BILITOT 0.6 07/25/2017    AST 34 07/25/2017    ALKPHOS 103 07/25/2017    ALT 36 07/25/2017     COAGS:   Lab Results   Component Value Date    INR 2.2 (H) 07/25/2017     FLP:   Lab Results   Component Value Date    CHOL 112 (L) 12/02/2016    HDL 26 (L) 12/02/2016    LDLCALC 57.4 (L) 12/02/2016    TRIG 143 12/02/2016    CHOLHDL 23.2 12/02/2016     CARDIAC:   Lab Results   Component Value Date    TROPONINI 0.017 07/25/2017     (H) 07/25/2017       Imaging:    EKG: Sinus rhythm.  Nonspecific ST-T wave changes.    Echo:      CONCLUSIONS     1 - Mildly to moderately enlarged aortic root.     2 - Mild left atrial enlargement.     3 - Mild left ventricular enlargement.     4 - Eccentric hypertrophy.     5 - Moderately depressed left ventricular systolic function (EF 35-40%).     6 - Low normal to mildly depressed right ventricular systolic function .     7 - No significant change from Echo on 6/19/2015.       I have personally reviewed these images and echo data    Assessment/Plan:  Eva was seen today for follow-up.    Diagnoses and all orders for this visit:    Cardiomyopathy, unspecified type    Paroxysmal atrial fibrillation    Other orders  -     losartan (COZAAR) 50 MG tablet; Take 1 tablet (50 mg total) by mouth once daily.       1reduce lasix to 40 mg am and 40 mg pm.  2.Loasartan 50 mg  3.ECHO in October and follow with Dr Cleveland.    Total duration of face to face visit time 30 minutes.  Total time spent counseling greater than fifty percent of total visit time.  Counseling included discussion regarding imaging findings, diagnosis, possibilities, treatment options, risks and benefits.  The patient had many questions regarding the options and long-term effect which were all answered to my best ability.      Josef London MD,MRCP,RPVI,FACC,FSCAI.  Interventional Cardiology   Phone [phone]

## 2017-08-02 DIAGNOSIS — I15.9 SECONDARY HYPERTENSION: Primary | ICD-10-CM

## 2017-08-07 ENCOUNTER — TELEPHONE (OUTPATIENT)
Dept: CARDIOLOGY | Facility: CLINIC | Age: 72
End: 2017-08-07

## 2017-08-07 NOTE — TELEPHONE ENCOUNTER
Spoke with Ms. Cj, She stated that Dr. London put her on losartan 50mg daily. Her BP this morning was 129/58 and 128/54. I stated I would talk with Dr. London about this, and she also stated that she has been SOB. I asked if she has noticed any swelling of her feet or any weight gain. she stated that she has had some swelling but she doesn't weight herself daily. Advised her to start weighing herself daily and to call us if she has a 3 plus pound weight gain. Pt verbalized understanding. No further issues noted.

## 2017-08-07 NOTE — TELEPHONE ENCOUNTER
----- Message from Juliocesar Gamble sent at 8/7/2017  9:36 AM CDT -----  Contact: Eva  He would like a call back from nurse. States her medication was changed, but now her blood pressure is lower 129/58 and 128/54.Please call back [phone] (home)   Thanks!

## 2017-08-08 ENCOUNTER — TELEPHONE (OUTPATIENT)
Dept: CARDIOLOGY | Facility: CLINIC | Age: 72
End: 2017-08-08

## 2017-08-08 NOTE — TELEPHONE ENCOUNTER
----- Message from Alireza Hastings sent at 8/8/2017 12:00 PM CDT -----  Contact: pt's daughter Nancie Nunez is calling to leave a fax number to Dr Gill's office Fax#[phone]  Call Back#[phone]  Thanks

## 2017-08-08 NOTE — TELEPHONE ENCOUNTER
Patient called because she would like us to send her records over to Dr. Lewis's office. No further issues noted.    Called patient back to inform her that she would need to fill out a medical release at Dr. Lewis's office for us to release it to them, and also to find out if she wants to cancel her appt with Dr. Walter in October. No answer and unable to leave voicemail. Will continue to call.

## 2017-08-22 ENCOUNTER — ANTI-COAG VISIT (OUTPATIENT)
Dept: CARDIOLOGY | Facility: CLINIC | Age: 72
End: 2017-08-22
Payer: MEDICARE

## 2017-08-22 DIAGNOSIS — I48.91 ATRIAL FIBRILLATION, UNSPECIFIED TYPE: ICD-10-CM

## 2017-08-22 DIAGNOSIS — Z79.01 LONG TERM CURRENT USE OF ANTICOAGULANT THERAPY: Primary | ICD-10-CM

## 2017-08-22 LAB — INR PPP: 2.2 (ref 2–3)

## 2017-08-22 PROCEDURE — 99211 OFF/OP EST MAY X REQ PHY/QHP: CPT | Mod: 25,S$GLB,, | Performed by: PHARMACIST

## 2017-08-22 PROCEDURE — 85610 PROTHROMBIN TIME: CPT | Mod: QW,S$GLB,, | Performed by: PHARMACIST

## 2017-08-22 NOTE — PROGRESS NOTES
INR in range, watch for slow trending downward.  Pt reports she went to ED 7/25 due to SOB, was only started on losartan.  She has f/u the following week with cards and reports no further changes or problems. She denies any missed doses. She reports she has made some healthy dietary changes, including decreased sodium.  She is eating more salads but these are iceberg. w ill maintain and recheck in a month

## 2017-08-24 DIAGNOSIS — E11.9 DIABETES MELLITUS WITHOUT COMPLICATION: ICD-10-CM

## 2017-08-28 ENCOUNTER — TELEPHONE (OUTPATIENT)
Dept: CARDIOLOGY | Facility: CLINIC | Age: 72
End: 2017-08-28

## 2017-08-28 NOTE — TELEPHONE ENCOUNTER
Patient called because Dr. Schmidt had put her on an extra BP medication, losartan 50mg daily. She stated that her bp has been very low, she wasn't able to give me a full reading. She stated that her systolic number was 102 but she couldn't remember the bottom number. She also asked if she should stop taking the losartan for now until she see's Dr. Cleveland. Informed her that I would talk woth Dr. Cleveland about this.    Offered her an appt with Dr. Walter for 08/31/2017 @ 3pm. Patient accepted. No further issues noted.

## 2017-08-28 NOTE — TELEPHONE ENCOUNTER
----- Message from Juliocesar Gamble sent at 8/28/2017  9:40 AM CDT -----  Contact: Eva  Called about her medication she was put on that dropper her blood pressure down too low. I let her speak with Felisha  In clinic. Thanks!

## 2017-08-28 NOTE — TELEPHONE ENCOUNTER
Spoke with Ms. Corona, informed her that Dr. Cleveland would like her to take her BP this morning and this afternoon, and give us a call around 4pm. She verbalized understanding. No further issues noted.

## 2017-08-29 ENCOUNTER — TELEPHONE (OUTPATIENT)
Dept: CARDIOLOGY | Facility: CLINIC | Age: 72
End: 2017-08-29

## 2017-08-29 NOTE — TELEPHONE ENCOUNTER
----- Message from Yun Cuellar sent at 8/29/2017  7:32 AM CDT -----  Contact: johnnie   bp 120  60 08 28  PM reading   109  61 9:30 pm     Call back

## 2017-08-29 NOTE — TELEPHONE ENCOUNTER
Spoke with Ms. Corona, Informed her that her bp reading she gave were good. Advised her to continue logging her bp and taking her medication how it is prescribed, and Dr. Cleveland will go over the log on Thursday at her appt. Patient verbalized understanding. No further issues noted.

## 2017-08-31 ENCOUNTER — OFFICE VISIT (OUTPATIENT)
Dept: CARDIOLOGY | Facility: CLINIC | Age: 72
End: 2017-08-31
Payer: MEDICARE

## 2017-08-31 VITALS
SYSTOLIC BLOOD PRESSURE: 143 MMHG | DIASTOLIC BLOOD PRESSURE: 67 MMHG | BODY MASS INDEX: 30.67 KG/M2 | OXYGEN SATURATION: 91 % | HEART RATE: 93 BPM | WEIGHT: 184.06 LBS | HEIGHT: 65 IN

## 2017-08-31 DIAGNOSIS — Z98.890 S/P ABLATION OF ATRIAL FIBRILLATION: ICD-10-CM

## 2017-08-31 DIAGNOSIS — I50.22 CHRONIC SYSTOLIC CONGESTIVE HEART FAILURE: ICD-10-CM

## 2017-08-31 DIAGNOSIS — I48.19 PERSISTENT ATRIAL FIBRILLATION: ICD-10-CM

## 2017-08-31 DIAGNOSIS — I10 ESSENTIAL HYPERTENSION: ICD-10-CM

## 2017-08-31 DIAGNOSIS — Z86.79 PERSONAL HISTORY OF ATRIAL FIBRILLATION: ICD-10-CM

## 2017-08-31 DIAGNOSIS — E78.2 MIXED HYPERLIPIDEMIA: ICD-10-CM

## 2017-08-31 DIAGNOSIS — R06.09 DOE (DYSPNEA ON EXERTION): ICD-10-CM

## 2017-08-31 DIAGNOSIS — Z86.79 S/P ABLATION OF ATRIAL FIBRILLATION: ICD-10-CM

## 2017-08-31 DIAGNOSIS — I50.33 ACUTE ON CHRONIC DIASTOLIC HEART FAILURE: Primary | ICD-10-CM

## 2017-08-31 DIAGNOSIS — I48.0 PAROXYSMAL ATRIAL FIBRILLATION: ICD-10-CM

## 2017-08-31 PROCEDURE — 3077F SYST BP >= 140 MM HG: CPT | Mod: S$GLB,,, | Performed by: INTERNAL MEDICINE

## 2017-08-31 PROCEDURE — 3078F DIAST BP <80 MM HG: CPT | Mod: S$GLB,,, | Performed by: INTERNAL MEDICINE

## 2017-08-31 PROCEDURE — 1159F MED LIST DOCD IN RCRD: CPT | Mod: S$GLB,,, | Performed by: INTERNAL MEDICINE

## 2017-08-31 PROCEDURE — 99499 UNLISTED E&M SERVICE: CPT | Mod: S$GLB,,, | Performed by: INTERNAL MEDICINE

## 2017-08-31 PROCEDURE — 1126F AMNT PAIN NOTED NONE PRSNT: CPT | Mod: S$GLB,,, | Performed by: INTERNAL MEDICINE

## 2017-08-31 PROCEDURE — 99999 PR PBB SHADOW E&M-EST. PATIENT-LVL V: CPT | Mod: PBBFAC,,, | Performed by: INTERNAL MEDICINE

## 2017-08-31 PROCEDURE — 99215 OFFICE O/P EST HI 40 MIN: CPT | Mod: S$GLB,,, | Performed by: INTERNAL MEDICINE

## 2017-08-31 PROCEDURE — 3008F BODY MASS INDEX DOCD: CPT | Mod: S$GLB,,, | Performed by: INTERNAL MEDICINE

## 2017-08-31 NOTE — PROGRESS NOTES
[facility]    CC: Follow up    Patient ID: Eva Corona is a 71 y.o. female with a past medical history of DM2, Afib s/p ablation (on coumadin), Non-ischemic cardiomyopathy (EF 35-40%), HTN, HLD, who presents for a follow up appointment.  Pertinent history/events are as follows:       -Pt was followed by Dr. Harden in the past.    -Admitted with PAF with RVR and CHF on 1/30/14- underwent successful electrical cardioversion and was placed on sotalol.    -Echo from 11/30/2015 shows EF:35-40% (unchanged from 6/19/2015); Mildly to moderately enlarged aortic root.    -Underwent cardiac catherization on 6/3/2015: RHC showed increased right heart pressures: RV:60/14, PAP:63/25. Coronary angiogram revealed normal coronaries and normal LV function.    -Underwent Afib ablation on 6/18/2015.    -At our initial clinic visit on 10/10/2016 BP noted to be elevated at 152/74.  Blood pressure also noted to be difficult to control and not at goal in past visits with Dr. Harden.  Pt was continue on current regimen and instructed to bring in log of blood pressure/heart rate next visit    -At 11/10/2016 clinic visit, BP remained elevated in 150's systolic.  Increased Toprol XL to 100 mg daily for improved blood pressure control.  Continue Norvasc 5 mg daily.  Pt instructed to bring in bp machine with her next visit.      -At follow up clinic visit on 12/9/2016, pt stated she was unable to tolerate the increase in Toprol XL to 100 mg daily due to feeling weak and tired.  She went back to 50 mg daily, and now feels better on this dosage.  BP remains elevated.  Increase Norvasc to 10 mg daily.  Continue Toprol XL 50 mg daily.  Pt 's blood pressure has been difficult to control for several years per chart review.  Will continue to make small changes to her regimen as she tolerates them.   Pt instructed to eat a low salt diet as well.     -At 1/26/2017 clinic visit, Mrs. Corona reported blood pressures better controlled at  home.  She has not been able to tolerate small changes to her regimen.  Will continue current regimen and monitor her home blood pressure readings.  Continue a low salt diet and weight loss.       -At 4/27/2017 clinic visit, pt reported blood pressures have been stable and mainly in 130's at home.  She has no chest pain, significant SOB, LE edema, palpitations, syncope or TIA symptoms.  Pt continued on current regimen.     -On 7/25/2017, pt presented to [facility] ED with worsening shortness of breath.  Lasix was increased from 40 mg twice a day to 80 mg in the morning and 40 mg at night.    -On 8/1/2017, pt was seen in clinic by Dr. London.  She reported doing well with increased dose of Lasix.  She was eating out a lot before this episode.  Likely the increased salt in her diet led to this decompensation.  She was started on Losartan 50 mg daily for improved blood pressure control.  Pt also noted to be taking lasix at 40 mg bid.    HPI:  Today, Ms. Corona states she's doing better.  Still has occasional SOB which occurs sporadically at rest and on exertion. Taking all medications as prescribed with no issues.  Maintaining a low salt diet.   Pt states she uses 2L supplemental oxygen at night.  States she underwent extensive pulmonary workup several years ago, but no definitive diagnosis was obtained.  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      Past Medical History:   Diagnosis Date    Anticoagulant long-term use     Atrial fibrillation     Cataract     CHF (congestive heart failure)     Diabetes mellitus     Encounter for blood transfusion     Hyperlipidemia     Hypertension     On home oxygen therapy     2L, nightly    Shortness of breath on exertion     Thyroid disease      Past Surgical History:   Procedure Laterality Date    CARDIAC CATHETERIZATION Right     CARDIAC ELECTROPHYSIOLOGY MAPPING AND ABLATION      COLONOSCOPY      EYE SURGERY Bilateral      cataract, implants    HYSTERECTOMY      TVH, ovaries intact, benign      Review of patient's allergies indicates:   Allergen Reactions    Codeine Nausea And Vomiting    Neuromuscular blockers, steroidal      Other reaction(s): Unknown    Morphine Nausea And Vomiting       Current Outpatient Prescriptions:     acetaminophen (TYLENOL) 325 MG tablet, Take 2 tablets (650 mg total) by mouth every 6 (six) hours as needed for Pain., Disp: , Rfl: 0    amlodipine (NORVASC) 5 MG tablet, TAKE 1 TABLET (5 MG TOTAL) BY MOUTH ONCE DAILY., Disp: 90 tablet, Rfl: 3    ascorbic acid (VITAMIN C) 1000 MG tablet, Take 1,000 mg by mouth once daily., Disp: , Rfl:     atorvastatin (LIPITOR) 10 MG tablet, TAKE 1 TABLET (10 MG TOTAL) BY MOUTH ONCE DAILY., Disp: 90 tablet, Rfl: 3    COCONUT OIL ORAL, Take by mouth., Disp: , Rfl:     fluticasone (FLONASE) 50 mcg/actuation nasal spray, 2 sprays by Each Nare route every evening., Disp: 3 Bottle, Rfl: 3    furosemide (LASIX) 40 MG tablet, Take 80 mg in the morning and 40 mg in the evening., Disp: 120 tablet, Rfl: 0    gabapentin (NEURONTIN) 300 MG capsule, TAKE 2 CAPSULES (600 MG TOTAL) BY MOUTH 3 (THREE) TIMES DAILY., Disp: 540 capsule, Rfl: 3    glimepiride (AMARYL) 4 MG tablet, TAKE 2 TABLETS EVERY DAY WITH BREAKFAST, Disp: 180 tablet, Rfl: 3    lansoprazole (PREVACID SOLUTAB) 30 MG disintegrating tablet, Take 30 mg by mouth once daily., Disp: , Rfl:     levothyroxine (SYNTHROID) 75 MCG tablet, TAKE 1 TABLET EVERY DAY  BEFORE  BREAKFAST, Disp: 90 tablet, Rfl: 3    losartan (COZAAR) 50 MG tablet, Take 1 tablet (50 mg total) by mouth once daily., Disp: 90 tablet, Rfl: 0    metformin (GLUCOPHAGE) 500 MG tablet, Take 2 tablets (1,000 mg total) by mouth 2 (two) times daily with meals., Disp: 360 tablet, Rfl: 1    metoprolol succinate (TOPROL-XL) 100 MG 24 hr tablet, Take 1 tablet (100 mg total) by mouth once daily. (Patient taking differently: Take 100 mg by mouth once daily.  "Take 1/2 tablet PO daily), Disp: 30 tablet, Rfl: 11    MULTIVIT-MIN/FA/CA CARB/VIT K (ONE-A-DAY WOMEN'S 50+ ORAL), Take 1 tablet by mouth once daily. , Disp: , Rfl:     potassium chloride SA (K-DUR,KLOR-CON) 20 MEQ tablet, Take 1 tablet (20 mEq total) by mouth once daily., Disp: 30 tablet, Rfl: 11    VITAMIN A ORAL, Take 1 tablet by mouth once daily. , Disp: , Rfl:     warfarin (COUMADIN) 5 MG tablet, 1.5 tablets (7.5 mg) PO on Mon, Wed, Fri.  1 tablet PO (5 mg) all other days., Disp: 135 tablet, Rfl: 3      Social History     Social History    Marital status:      Spouse name: N/A    Number of children: N/A    Years of education: N/A     Occupational History    Not on file.     Social History Main Topics    Smoking status: Never Smoker    Smokeless tobacco: Never Used    Alcohol use No    Drug use: No    Sexual activity: No     Other Topics Concern    Not on file     Social History Narrative    No narrative on file     Family History   Problem Relation Age of Onset    Diabetes Mother     Cancer Neg Hx          Review of Systems  Constitution: Denies chills, fever, and sweats.  HENT: Denies headaches or blurry vision.  Cardiovascular: Denies chest pain or irregular heart beat.  Respiratory: Denies cough or shortness of breath.  Gastrointestinal: Denies abdominal pain, nausea, or vomiting.  Musculoskeletal: Denies muscle cramps.  Neurological: Denies dizziness or focal weakness.  Psychiatric/Behavioral: Normal mental status.  Hematologic/Lymphatic: Denies bleeding problem or easy bruising/bleeding.  Skin: Denies rash or suspicious lesions    Physical Examination  BP (!) 143/67 (BP Location: Left arm)   Pulse 93   Ht 5' 5" (1.651 m)   Wt 83.5 kg (184 lb 1.4 oz)   LMP  (LMP Unknown)   SpO2 (!) 91%   BMI 30.63 kg/m²     Constitutional: No acute distress, conversant  HEENT: Sclera anicteric, Pupils equal, round and reactive to light, extraocular motions intact, Oropharynx clear  Neck: No " JVD, no carotid bruits  Cardiovascular: regular rate and rhythm, no murmur, rubs or gallops, normal S1/S2  Pulmonary: Clear to auscultation bilaterally  Abdominal: Abdomen soft, nontender, nondistended, positive bowel sounds  Extremities: No lower extremity edema,   Pulses:  Carotid pulses are 2+ on the right side, and 2+ on the left side.  Radial pulses are 2+ on the right side, and 2+ on the left side.   Femoral pulses are 2+ on the right side, and 2+ on the left side.  Skin: No ecchymosis, erythema, or ulcers  Psych: Alert and oriented x 3, appropriate affect  Neuro: CNII-XII intact, no focal deficits    Labs:  Results for MUMTAZ MONTALVO (MRN [id number]) as of 12/9/2016 17:27   Ref. Range 12/2/2016 09:38   Cholesterol Latest Ref Range: 120 - 199 mg/dL 112 (L)   HDL Latest Ref Range: 40 - 75 mg/dL 26 (L)   LDL Cholesterol Latest Ref Range: 63.0 - 159.0 mg/dL 57.4 (L)   Total Cholesterol/HDL Ratio Latest Ref Range: 2.0 - 5.0  4.3   Triglycerides Latest Ref Range: 30 - 150 mg/dL 143       Echo 11/30/2015:  CONCLUSIONS     1 - Mildly to moderately enlarged aortic root.     2 - Mild left atrial enlargement.     3 - Mild left ventricular enlargement.     4 - Eccentric hypertrophy.     5 - Moderately depressed left ventricular systolic function (EF 35-40%).     6 - Low normal to mildly depressed right ventricular systolic function .     7 - No significant change from Echo on 6/19/2015.     Assessment/Plan:   Mumtaz Montalvo is a 71 y.o. female with a past medical history of DM2, Afib s/p ablation (on coumadin), Non-ischemic cardiomyopathy (EF 35-40%), HTN, HLD, who presents for follow up.     1. HTN- Readings improved with addition of Losartan 50 mg daily.  Continue current medication regimen and low salt diet.       2. Non-ischemic Cardiomyopathy-  Asymptomatic at this time and well compensated on exam.  Continue to monitor.  Repeat echo prior to next visit.      3. PAF s/p ablation- Continue anticoagulation with coumadin.       4. HLD- Continue atorvastatin 10 mg daily.    5. SOB- Pt reports occasional SOB which occurs sporadically at rest and on exertion.  She uses 2L supplemental oxygen at night.  States she underwent extensive pulmonary workup several years ago, but no definitive diagnosis was obtained. Will refer to Pulmonary  (Dr. Asif) for evaluation.    Follow up in 2 months with echo prior    Total duration of face to face visit time 45 minutes.  Total time spent counseling greater than fifty percent of total visit time.  Counseling included discussion regarding imaging findings, diagnosis, possibilities, treatment options, risks and benefits.      Justus Cleveland MD, PhD  Interventional Cardiology

## 2017-08-31 NOTE — PATIENT INSTRUCTIONS
-Continue current medication regimen  -Refer to Pulmonary  (Dr. Asif) for evaluation  -Follow up in 2 months with echo prior

## 2017-09-13 RX ORDER — FLUTICASONE PROPIONATE 50 MCG
SPRAY, SUSPENSION (ML) NASAL
Qty: 48 G | Refills: 3 | Status: SHIPPED | OUTPATIENT
Start: 2017-09-13 | End: 2018-08-27 | Stop reason: SDUPTHER

## 2017-09-19 ENCOUNTER — ANTI-COAG VISIT (OUTPATIENT)
Dept: CARDIOLOGY | Facility: CLINIC | Age: 72
End: 2017-09-19
Payer: MEDICARE

## 2017-09-19 ENCOUNTER — LAB VISIT (OUTPATIENT)
Dept: LAB | Facility: HOSPITAL | Age: 72
End: 2017-09-19
Attending: FAMILY MEDICINE
Payer: MEDICARE

## 2017-09-19 DIAGNOSIS — Z79.01 LONG TERM CURRENT USE OF ANTICOAGULANT THERAPY: Primary | ICD-10-CM

## 2017-09-19 DIAGNOSIS — I48.91 ATRIAL FIBRILLATION, UNSPECIFIED TYPE: ICD-10-CM

## 2017-09-19 LAB
ANION GAP SERPL CALC-SCNC: 7 MMOL/L
BUN SERPL-MCNC: 24 MG/DL
CALCIUM SERPL-MCNC: 9.3 MG/DL
CHLORIDE SERPL-SCNC: 99 MMOL/L
CO2 SERPL-SCNC: 35 MMOL/L
CREAT SERPL-MCNC: 0.9 MG/DL
EST. GFR  (AFRICAN AMERICAN): >60 ML/MIN/1.73 M^2
EST. GFR  (NON AFRICAN AMERICAN): >60 ML/MIN/1.73 M^2
ESTIMATED AVG GLUCOSE: 169 MG/DL
GLUCOSE SERPL-MCNC: 130 MG/DL
HBA1C MFR BLD HPLC: 7.5 %
INR PPP: 2.5 (ref 2–3)
POTASSIUM SERPL-SCNC: 4.4 MMOL/L
SODIUM SERPL-SCNC: 141 MMOL/L

## 2017-09-19 PROCEDURE — 36415 COLL VENOUS BLD VENIPUNCTURE: CPT | Mod: PO

## 2017-09-19 PROCEDURE — 80048 BASIC METABOLIC PNL TOTAL CA: CPT

## 2017-09-19 PROCEDURE — 85610 PROTHROMBIN TIME: CPT | Mod: QW,S$GLB,, | Performed by: PHARMACIST

## 2017-09-19 PROCEDURE — 83036 HEMOGLOBIN GLYCOSYLATED A1C: CPT

## 2017-09-26 ENCOUNTER — DOCUMENTATION ONLY (OUTPATIENT)
Dept: FAMILY MEDICINE | Facility: CLINIC | Age: 72
End: 2017-09-26

## 2017-09-26 NOTE — PROGRESS NOTES
Pre-Visit Chart Review  For Appointment Scheduled on 09/28/2017      Health Maintenance Due   Topic Date Due    TETANUS VACCINE  09[birth year]04/1963    Zoster Vaccine  09/04/2005    Influenza Vaccine  08/01/2017

## 2017-09-28 ENCOUNTER — OFFICE VISIT (OUTPATIENT)
Dept: FAMILY MEDICINE | Facility: CLINIC | Age: 72
End: 2017-09-28
Payer: MEDICARE

## 2017-09-28 VITALS
TEMPERATURE: 98 F | HEART RATE: 85 BPM | SYSTOLIC BLOOD PRESSURE: 104 MMHG | WEIGHT: 185.88 LBS | DIASTOLIC BLOOD PRESSURE: 51 MMHG | HEIGHT: 65 IN | BODY MASS INDEX: 30.97 KG/M2

## 2017-09-28 DIAGNOSIS — I50.33 ACUTE ON CHRONIC DIASTOLIC HEART FAILURE: Primary | ICD-10-CM

## 2017-09-28 DIAGNOSIS — E03.9 ACQUIRED HYPOTHYROIDISM: ICD-10-CM

## 2017-09-28 DIAGNOSIS — I27.20 PULMONARY HYPERTENSION: ICD-10-CM

## 2017-09-28 DIAGNOSIS — E11.69 HYPERLIPIDEMIA ASSOCIATED WITH TYPE 2 DIABETES MELLITUS: ICD-10-CM

## 2017-09-28 DIAGNOSIS — E11.59 HYPERTENSION ASSOCIATED WITH DIABETES: ICD-10-CM

## 2017-09-28 DIAGNOSIS — I15.2 HYPERTENSION ASSOCIATED WITH DIABETES: ICD-10-CM

## 2017-09-28 DIAGNOSIS — Z00.00 ENCOUNTER FOR PREVENTIVE HEALTH EXAMINATION: ICD-10-CM

## 2017-09-28 DIAGNOSIS — E66.9 OBESITY, CLASS I, BMI 30-34.9: ICD-10-CM

## 2017-09-28 DIAGNOSIS — I70.0 ABDOMINAL AORTIC ATHEROSCLEROSIS: ICD-10-CM

## 2017-09-28 DIAGNOSIS — I42.9 CARDIOMYOPATHY, UNSPECIFIED TYPE: ICD-10-CM

## 2017-09-28 DIAGNOSIS — I48.19 PERSISTENT ATRIAL FIBRILLATION: ICD-10-CM

## 2017-09-28 DIAGNOSIS — D64.9 ANEMIA, UNSPECIFIED TYPE: ICD-10-CM

## 2017-09-28 DIAGNOSIS — E78.5 HYPERLIPIDEMIA ASSOCIATED WITH TYPE 2 DIABETES MELLITUS: ICD-10-CM

## 2017-09-28 PROCEDURE — 99999 PR PBB SHADOW E&M-EST. PATIENT-LVL V: CPT | Mod: PBBFAC,,, | Performed by: PHYSICIAN ASSISTANT

## 2017-09-28 PROCEDURE — G0439 PPPS, SUBSEQ VISIT: HCPCS | Mod: S$GLB,,, | Performed by: PHYSICIAN ASSISTANT

## 2017-09-28 PROCEDURE — 99499 UNLISTED E&M SERVICE: CPT | Mod: S$GLB,,, | Performed by: PHYSICIAN ASSISTANT

## 2017-09-28 RX ORDER — METFORMIN HYDROCHLORIDE 500 MG/1
500 TABLET ORAL
COMMUNITY
End: 2018-06-19

## 2017-09-28 RX ORDER — GABAPENTIN 300 MG/1
300 CAPSULE ORAL NIGHTLY
COMMUNITY
End: 2018-03-19

## 2017-09-28 NOTE — Clinical Note
Primary Care Providers: Roxanan Peterson MD, MD (General)  Your patient was seen today for a HRA visit. Gap(s) in care (HEDIS gaps) have been identified during this visit that require additional testing and possible follow up.  No orders of the defined types were placed in this encounter.   These orders were placed using [facility] approved protocol and any results will be forwarded to your office for appropriate follow up. I have included a copy of my visit note; please review the note and feel free to contact me with any questions.   Thank you for allowing me to participate in the care of your patients. OSKAR Miranda

## 2017-09-28 NOTE — PROGRESS NOTES
"Eva Corona presented for a  Medicare AWV and comprehensive Health Risk Assessment today. The following components were reviewed and updated:    · Medical history  · Family History  · Social history  · Allergies and Current Medications  · Health Risk Assessment  · Health Maintenance  · Care Team     ** See Completed Assessments for Annual Wellness Visit within the encounter summary.**       The following assessments were completed:  · Living Situation  · CAGE  · Depression Screening  · Timed Get Up and Go  · Whisper Test  · Cognitive Function Screening  · Nutrition Screening  · ADL Screening  · PAQ Screening    Vitals:    09/28/17 1451   BP: (!) 104/51   BP Location: Left arm   Patient Position: Sitting   BP Method: Large (Automatic)   Pulse: 85   Temp: 97.8 °F (36.6 °C)   TempSrc: Oral   Weight: 84.3 kg (185 lb 13.6 oz)   Height: 5' 5" (1.651 m)     Body mass index is 30.93 kg/m².  Physical Exam   Constitutional: She is oriented to person, place, and time. She appears well-developed and well-nourished.   HENT:   Head: Normocephalic and atraumatic.   Eyes: Conjunctivae are normal. Pupils are equal, round, and reactive to light.   Neck: Normal range of motion. Neck supple. No JVD present.   Cardiovascular: Normal rate and regular rhythm.  Exam reveals no gallop and no friction rub.    No murmur heard.  Pulmonary/Chest: Effort normal and breath sounds normal. No respiratory distress. She has no wheezes. She has no rales.   Neurological: She is alert and oriented to person, place, and time.   Skin: Skin is warm and dry.   Psychiatric: She has a normal mood and affect. Her behavior is normal. Judgment and thought content normal.                 Diagnoses and health risks identified today and associated recommendations/orders:    Eva was seen today for health risk assessment.    Diagnoses and all orders for this visit:    Acute on chronic diastolic heart failure  Comments:  stable, followed by cardiology    Pulmonary " hypertension  Comments:  controlled, followed by pulmonology    Persistent atrial fibrillation  Comments:  stable, continue to monitor    Cardiomyopathy, unspecified type  Comments:  stable, continue to monitor    Uncontrolled type 2 diabetes mellitus without complication, without long-term current use of insulin  Comments:  uncontrolled, continue meds    Hypertension associated with diabetes  Comments:  patient hypotensive, hold 1/2 lasix tonight and recheck BP in the AM.  Call if still hypotensive.  Push fluids    Hyperlipidemia associated with type 2 diabetes mellitus  Comments:  controlled. continue meds    Abdominal aortic atherosclerosis  Comments:  stable, continue to monitor    Obesity, Class I, BMI 30-34.9  Comments:  uncontrolled, encouraged exercise    Acquired hypothyroidism  Comments:  controlled, continue meds    Anemia, unspecified type  Comments:  stable, continue to monitor    Encounter for preventive health examination        Provided Eva with a 5-10 year written screening schedule and personal prevention plan. Recommendations were developed using the USPSTF age appropriate recommendations. Education, counseling, and referrals were provided as needed. After Visit Summary printed and given to patient which includes a list of additional screenings\tests needed.    No Follow-up on file.    OSKAR Miranda     Patient readiness: acceptance and barriers:none    During the course of the visit the patient was educated and counseled about the following:     Diabetes:  Discussed general issues about diabetes pathophysiology and management.  Hypertension:   Regular aerobic exercise.  Obesity:   General weight loss/lifestyle modification strategies discussed (elicit support from others; identify saboteurs; non-food rewards, etc).    Goals: Diabetes: Maintain Hemoglobin A1C below 7, Hypertension: Reduce Blood Pressure and Obesity: Reduce calorie intake and BMI    Did patient meet goals/outcomes:  No    The following self management tools provided: blood pressure log  blood glucose log  excercise log    Patient Instructions (the written plan) was given to the patient/family.     Time spent with patient: 55 minutes

## 2017-09-28 NOTE — PATIENT INSTRUCTIONS
Weight Management: Getting Started  Healthy bodies come in all shapes and sizes. Not all bodies are made to be thin. For some people, a healthy weight is higher than the average weight listed on weight charts. Your healthcare provider can help you decide on a healthy weight for you.    Reasons to lose weight  Losing weight can help with some health problems, such as high blood pressure, heart disease, diabetes, sleep apnea, and arthritis. You may also feel more energy.  Set your long-term goal  Your goal doesn't even have to be a specific weight. You may decide on a fitness goal (such as being able to walk 10 miles a week), or a health goal (such as lowering your blood pressure). Choose a goal that is measurable and reasonable, so you know when you've reached it. A goal of reaching a BMI of less than 25 is not always reasonable (or possible).   Make an action plan  Habits dont change overnight. Setting your goals too high can leave you feeling discouraged if you cant reach them. Be realistic. Choose one or two small changes you can make now. Set an action plan for how you are going to make these changes. When you can stick to this plan, keep making a few more small changes. Taking small steps will help you stay on the path to success.  Track your progress  Write down your goals. Then, keep a daily record of your progress. Write down what you eat and how active you are. This record lets you look back on how much youve done. It may also help when youre feeling frustrated. Reward yourself for success. Even if you dont reach every goal, give yourself credit for what you do get done.  Get support  Encouragement from others can help make losing weight easier. Ask your family members and friends for support. They may even want to join you. Also look to your healthcare provider, registered dietitian, and  for help. Your local hospital can give you more information about nutrition, exercise, and  weight loss.  Date Last Reviewed: 1/31/2016 © 2000-2017 [employer]. [address] [place] [address], [place] [address]. All rights reserved. This information is not intended as a substitute for professional medical care. Always follow your healthcare professional's instructions.        Walking for Fitness  Fitness walking has something for everyone, even people who are already fit. Walking is one of the safest ways to condition your body aerobically. It can boost energy, help you lose weight, and reduce stress.    Physical benefits  · Walking strengthens your heart and lungs, and tones your muscles.  · When walking, your feet land with less impact than in other sports. This reduces chances of muscle, bone, and joint injury.  · Regular walking improves your cholesterol levels and lowers your risk of heart disease. And it helps you control your blood sugar if you have diabetes.  · Walking is a weight-bearing activity, which helps maintain bone density. This can help prevent osteoporosis.  Personal rewards  · Taking walks can help you relax and manage stress. And fitness walking may make you feel better about yourself.  · Walking can help you sleep better at night and make you less likely to be depressed.  · Regular walking may help maintain your memory as you get older.  · Walking is a great way to spend extra time with friends and family members. Be sure to invite your dog along!  Q&A about fitness walking  Q: Will walking keep me fit?  A: Yes. Regular walking at the right pace gives you all the benefits of other aerobic activities, such as jogging and swimming.  Q: Will walking help me lose weight and keep it off?  A: Yes. Per mile, walking can burn as many calories as jogging. Your health care provider can help work walking into your weight-loss plan.  Q: Is walking safe for my health?  A: Yes. Walking is safe if you have high blood pressure, diabetes, heart disease, or other conditions. Talk to your  healthcare provider before you start.  Date Last Reviewed: 4/1/2017  © 7012-4563 [employer]. [address] [place] [address], [place] [address]. All rights reserved. This information is not intended as a substitute for professional medical care. Always follow your healthcare professional's instructions.          Counseling and Referral of Other Preventative  (Italic type indicates deductible and co-insurance are waived)    Patient Name: Eva Corona  Today's Date: 9/28/2017      SERVICE LIMITATIONS RECOMMENDATION    Vaccines    · Pneumococcal (once after 65)    · Influenza (annually)    · Hepatitis B (if medium/high risk)    · Prevnar 13      Hepatitis B medium/high risk factors:       - End-stage renal disease       - Hemophiliacs who received Factor VII or         IX concentrates       - Clients of institutions for the mentally             retarded       - Persons who live in the same house as          a HepB carrier       - Homosexual men       - Illicit injectable drug abusers     Pneumococcal: Done, no repeat necessary     Influenza: Recommended to patient, declined     Hepatitis B: N/A     Prevnar 13: Done, no repeat necessary    Mammogram (biennial age 50-74)  Annually (age 40 or over)  N/A    Pap (up to age 70 and after 70 if unknown history or abnormal study last 10 years)    N/A     The USPSTF recommends against screening for cervical cancer in women who have had a hysterectomy with removal of the cervix and who do not have a history of a high-grade precancerous lesion (cervical intraepithelial neoplasia [THIEN] grade 2 or 3) or cervical cancer.     Colorectal cancer screening (to age 75)    · Fecal occult blood test (annual)  · Flexible sigmoidoscopy (5y)  · Screening colonoscopy (10y)  · Barium enema   N/A    Diabetes self-management training (no USPSTF recommendations)  Requires referral by treating physician for patient with diabetes or renal disease. 10 hours of initial DSMT sessions of no less  than 30 minutes each in a continuous 12-month period. 2 hours of follow-up DSMT in subsequent years.  Done this year, repeat every year    Bone mass measurements (age 65 & older, biennial)  Requires diagnosis related to osteoporosis or estrogen deficiency. Biennial benefit unless patient has history of long-term glucocorticoid  Last done 5/26/17, recommend to repeat every 3  years    Glaucoma screening (no USPSTF recommendation)  Diabetes mellitus, family history   , age 50 or over    American, age 65 or over  Done this year, repeat every year    Medical nutrition therapy for diabetes or renal disease (no recommended schedule)  Requires referral by treating physician for patient with diabetes or renal disease or kidney transplant within the past 3 years.  Can be provided in same year as diabetes self-management training (DSMT), and CMS recommends medical nutrition therapy take place after DSMT. Up to 3 hours for initial year and 2 hours in subsequent years.  Done this year, repeat every year    Cardiovascular screening blood tests (every 5 years)  · Fasting lipid panel  Order as a panel if possible  Done this year, repeat every year    Diabetes screening tests (at least every 3 years, Medicare covers annually or at 6-month intervals for prediabetic patients)  · Fasting blood sugar (FBS) or glucose tolerance test (GTT)  Patient must be diagnosed with one of the following:       - Hypertension       - Dyslipidemia       - Obesity (BMI 30kg/m2)       - Previous elevated impaired FBS or GTT       ... or any two of the following:       - Overweight (BMI 25 but <30)       - Family history of diabetes       - Age 65 or older       - History of gestational diabetes or birth of baby weighing more than 9 pounds  Done this year, repeat every year    HIV screening (annually for increased risk patients)  · HIV-1 and HIV-2 by EIA, or GELACIO, rapid antibody test or oral mucosa transudate  Patients must be at  increased risk for HIV infection per USPSTF guidelines or pregnant. Tests covered annually for patient at increased risk or as requested by the patient. Pregnant patients may receive up to 3 tests during pregnancy.  Risks discussed, screening is not recommended    Smoking cessation counseling (up to 8 sessions per year)  Patients must be asymptomatic of tobacco-related conditions to receive as a preventative service.  Non-smoker    Subsequent annual wellness visit  At least 12 months since last AWV  Return in one year     The following information is provided to all patients.  This information is to help you find resources for any of the problems found today that may be affecting your health:                Living healthy guide: www.[facility].[place].[facility]      Understanding Diabetes: www.diabetes.org      Eating healthy: www.cdc.gov/healthyweight      CDC home safety checklist: www.cdc.gov/steadi/patient.html      Agency on Aging: www.goea.[place].[facility]      Alcoholics anonymous (AA): www.aa.org      Physical Activity: www.matilda.nih.gov/ya9oqwo      Tobacco use: www.quitwithusla.org

## 2017-10-19 ENCOUNTER — CLINICAL SUPPORT (OUTPATIENT)
Dept: CARDIOLOGY | Facility: CLINIC | Age: 72
End: 2017-10-19
Payer: MEDICARE

## 2017-10-19 DIAGNOSIS — I50.22 CHRONIC SYSTOLIC CONGESTIVE HEART FAILURE: ICD-10-CM

## 2017-10-19 DIAGNOSIS — I42.9 CARDIOMYOPATHY, UNSPECIFIED TYPE: ICD-10-CM

## 2017-10-19 DIAGNOSIS — I27.20 PULMONARY HYPERTENSION: ICD-10-CM

## 2017-10-19 PROCEDURE — 93306 TTE W/DOPPLER COMPLETE: CPT | Mod: S$GLB,,, | Performed by: INTERNAL MEDICINE

## 2017-10-20 ENCOUNTER — HOSPITAL ENCOUNTER (EMERGENCY)
Facility: HOSPITAL | Age: 72
Discharge: HOME | End: 2017-10-20
Attending: EMERGENCY MEDICINE
Payer: MEDICARE

## 2017-10-20 VITALS
HEART RATE: 87 BPM | DIASTOLIC BLOOD PRESSURE: 58 MMHG | TEMPERATURE: 98 F | WEIGHT: 175 LBS | OXYGEN SATURATION: 96 % | SYSTOLIC BLOOD PRESSURE: 128 MMHG | RESPIRATION RATE: 16 BRPM | BODY MASS INDEX: 29.12 KG/M2

## 2017-10-20 DIAGNOSIS — R93.1 ABNORMAL ECHOCARDIOGRAM: Primary | ICD-10-CM

## 2017-10-20 DIAGNOSIS — R06.02 SOB (SHORTNESS OF BREATH): ICD-10-CM

## 2017-10-20 LAB
ALBUMIN SERPL BCP-MCNC: 4.1 G/DL
ALP SERPL-CCNC: 91 U/L
ALT SERPL W/O P-5'-P-CCNC: 16 U/L
ANION GAP SERPL CALC-SCNC: 13 MMOL/L
AORTIC VALVE REGURGITATION: ABNORMAL
AST SERPL-CCNC: 22 U/L
BASOPHILS # BLD AUTO: 0 K/UL
BASOPHILS NFR BLD: 0.3 %
BILIRUB SERPL-MCNC: 0.4 MG/DL
BNP SERPL-MCNC: 621 PG/ML
BUN SERPL-MCNC: 22 MG/DL
CALCIUM SERPL-MCNC: 9.8 MG/DL
CHLORIDE SERPL-SCNC: 98 MMOL/L
CO2 SERPL-SCNC: 30 MMOL/L
CREAT SERPL-MCNC: 0.9 MG/DL
DIASTOLIC DYSFUNCTION: YES
DIFFERENTIAL METHOD: ABNORMAL
EOSINOPHIL # BLD AUTO: 0.2 K/UL
EOSINOPHIL NFR BLD: 2.1 %
ERYTHROCYTE [DISTWIDTH] IN BLOOD BY AUTOMATED COUNT: 17.1 %
EST. GFR  (AFRICAN AMERICAN): >60 ML/MIN/1.73 M^2
EST. GFR  (NON AFRICAN AMERICAN): >60 ML/MIN/1.73 M^2
ESTIMATED PA SYSTOLIC PRESSURE: 66.37
GLOBAL PERICARDIAL EFFUSION: ABNORMAL
GLUCOSE SERPL-MCNC: 61 MG/DL
HCT VFR BLD AUTO: 39 %
HGB BLD-MCNC: 12.9 G/DL
LYMPHOCYTES # BLD AUTO: 2.6 K/UL
LYMPHOCYTES NFR BLD: 28.1 %
MCH RBC QN AUTO: 27 PG
MCHC RBC AUTO-ENTMCNC: 33.1 G/DL
MCV RBC AUTO: 82 FL
MITRAL VALVE REGURGITATION: ABNORMAL
MONOCYTES # BLD AUTO: 0.6 K/UL
MONOCYTES NFR BLD: 6.8 %
NEUTROPHILS # BLD AUTO: 5.8 K/UL
NEUTROPHILS NFR BLD: 62.7 %
PLATELET # BLD AUTO: 315 K/UL
PMV BLD AUTO: 7.4 FL
POTASSIUM SERPL-SCNC: 3.8 MMOL/L
PROT SERPL-MCNC: 8 G/DL
RBC # BLD AUTO: 4.78 M/UL
RETIRED EF AND QEF - SEE NOTES: 12 (ref 55–65)
SODIUM SERPL-SCNC: 141 MMOL/L
TRICUSPID VALVE REGURGITATION: ABNORMAL
TROPONIN I SERPL DL<=0.01 NG/ML-MCNC: 0.01 NG/ML
WBC # BLD AUTO: 9.3 K/UL

## 2017-10-20 PROCEDURE — 93005 ELECTROCARDIOGRAM TRACING: CPT

## 2017-10-20 PROCEDURE — 84484 ASSAY OF TROPONIN QUANT: CPT

## 2017-10-20 PROCEDURE — 36415 COLL VENOUS BLD VENIPUNCTURE: CPT

## 2017-10-20 PROCEDURE — 80053 COMPREHEN METABOLIC PANEL: CPT

## 2017-10-20 PROCEDURE — 85025 COMPLETE CBC W/AUTO DIFF WBC: CPT

## 2017-10-20 PROCEDURE — 99284 EMERGENCY DEPT VISIT MOD MDM: CPT | Mod: 25

## 2017-10-20 PROCEDURE — 83880 ASSAY OF NATRIURETIC PEPTIDE: CPT

## 2017-10-20 NOTE — ED PROVIDER NOTES
Encounter Date: 10/20/2017    SCRIBE #1 NOTE: I, Ashlyn Mae , am scribing for, and in the presence of,  Dr. Starks . I have scribed the entire note.       History     Chief Complaint   Patient presents with    abnormal echocardiogram       10/20/2017  3:01 PM     Chief Complaint: Abnormal echocardiogram       The patient is a 72 y.o. female who is presenting with to the ED for evaluation s/p an abnormal echocardiogram that was performed x 1 day ago and showed a decreased in EF to 12%. The pt reports that her cardiologist instructed her to come to the ED for additional w/u and treatment. Pt denies any acute complaints and states that her SOB is at baseline. No other comments or complaints. Pertinent PMHx long term anticoagulant use, CHF, DM, HTN, HDL. Pertinent past surgical hx includes cardiac catheterization and cardiac ablation.           The history is provided by the patient and medical records.     Review of patient's allergies indicates:   Allergen Reactions    Codeine Nausea And Vomiting    Neuromuscular blockers, steroidal      Other reaction(s): Unknown    Morphine Nausea And Vomiting     Past Medical History:   Diagnosis Date    Anticoagulant long-term use     Atrial fibrillation     Cataract     CHF (congestive heart failure)     Diabetes mellitus     Encounter for blood transfusion     Hyperlipidemia     Hypertension     Hypothyroidism     On home oxygen therapy     2L, nightly    S/P ablation of atrial fibrillation 7/22/2015    Shortness of breath on exertion     Thyroid disease     Type 2 diabetes mellitus      Past Surgical History:   Procedure Laterality Date    CARDIAC CATHETERIZATION Right     CARDIAC ELECTROPHYSIOLOGY MAPPING AND ABLATION      COLONOSCOPY      EYE SURGERY Bilateral     cataract, implants    HYSTERECTOMY      TVH, ovaries intact, benign     Family History   Problem Relation Age of Onset    Diabetes Mother     Heart disease Sister     No Known Problems  Brother     No Known Problems Daughter     No Known Problems Son     No Known Problems Brother     No Known Problems Brother     No Known Problems Son     Cancer Neg Hx      Social History   Substance Use Topics    Smoking status: Never Smoker    Smokeless tobacco: Never Used    Alcohol use No     Review of Systems   Constitutional: Negative for fever.   HENT: Negative for sore throat.    Respiratory: Negative for shortness of breath.    Cardiovascular: Negative for chest pain.   Gastrointestinal: Negative for nausea.   Genitourinary: Negative for dysuria.   Musculoskeletal: Negative for back pain.   Skin: Negative for rash.   Neurological: Negative for weakness.   Hematological: Does not bruise/bleed easily.       Physical Exam     Initial Vitals [10/20/17 1449]   BP Pulse Resp Temp SpO2   132/62 95 16 97.8 °F (36.6 °C) (!) 94 %      MAP       85.33         Physical Exam    Nursing note and vitals reviewed.  Constitutional: She appears well-developed and well-nourished. She is not diaphoretic. No distress.   HENT:   Head: Normocephalic and atraumatic.   Mouth/Throat: Oropharynx is clear and moist.   Eyes: EOM are normal. Pupils are equal, round, and reactive to light.   Neck: Normal range of motion. Neck supple.   Cardiovascular: Normal rate, regular rhythm, normal heart sounds and intact distal pulses. Exam reveals no gallop and no friction rub.    No murmur heard.  Pulmonary/Chest: Breath sounds normal. No respiratory distress. She has no wheezes. She has no rhonchi. She has no rales.   Abdominal: Soft. Bowel sounds are normal. There is no tenderness. There is no rebound and no guarding.   Musculoskeletal: Normal range of motion.   Neurological: She is alert and oriented to person, place, and time.   Skin: Skin is warm and dry.   Psychiatric: She has a normal mood and affect. Her behavior is normal. Judgment and thought content normal.         ED Course   Procedures  Labs Reviewed   COMPREHENSIVE  METABOLIC PANEL   CBC W/ AUTO DIFFERENTIAL   B-TYPE NATRIURETIC PEPTIDE   TROPONIN I     EKG Readings: (Independently Interpreted)   Normal sinus rhythm. Normal axis. Shortened KS interval. LVH noted no acute ST segment changes. No changes from EKG on 08/2017.        X-Rays:   Independently Interpreted Readings:   Chest X-Ray: Enlarged heart without infiltrates or sign of pulmonary edema     Medical Decision Making:   Initial Assessment:   72-year-old female presents with a chief complaint of an abnormal echocardiogram.  Differential Diagnosis:   Differential includes but is not limited to acute MI, anemia, and heart failure.  Clinical Tests:   Lab Tests: Ordered and Reviewed  Radiological Study: Ordered and Reviewed  Medical Tests: Ordered and Reviewed  ED Management:  The patient was emergently evaluated in the emergency Department, her evaluation was significant for an elderly female with a normal lung exam.  The patient's EKG showed no acute abnormalities per my independent interpretation.  The patient's x-ray showed no acute abnormalities per my independent interpretation.  The patient's labs showed no acute abnormalities.  The patient's diagnosis is an abnormal echocardiogram.  I discussed the case with the cardiologist on-call, Dr. Avalos.  He reports that the patient can safely discharged home and can be further worked up as an outpatient.  The patient will be discharged home to continue her home medication regimen and she will follow-up with cardiology in 3 days as an outpatient.  Other:   I have discussed this case with another health care provider.            Scribe Attestation:   Scribe #1: I performed the above scribed service and the documentation accurately describes the services I performed. I attest to the accuracy of the note.    Attending Attestation:           Physician Attestation for Scribe:  Physician Attestation Statement for Scribe #1: I, Dr. Shelley , reviewed documentation, as scribed by Ashlyn  Carmelita  in my presence, and it is both accurate and complete.             I, Dr. Devan Starks, personally performed the services described in this documentation. All medical record entries made by the scribe were at my direction and in my presence.  I have reviewed the chart and agree that the record reflects my personal performance and is accurate and complete. Devan Starks MD.  4:32 PM 10/20/2017       ED Course      Clinical Impression:   The encounter diagnosis was SOB (shortness of breath).                           Devan Starks MD  10/20/17 0268

## 2017-10-20 NOTE — ED NOTES
"Denies c/o - Hx of CHF ("worse the last three years--this is actually on of my better days") - had scheduled echo yesterday with some apparent concerning changes; sent by PMD to evaluate. Speaking in full sentances without difficulty. O2 sat=94% on RA  "

## 2017-10-23 ENCOUNTER — TELEPHONE (OUTPATIENT)
Dept: CARDIOLOGY | Facility: CLINIC | Age: 72
End: 2017-10-23

## 2017-10-23 NOTE — TELEPHONE ENCOUNTER
----- Message from Mariela Wyatt RT sent at 10/23/2017 10:09 AM CDT -----  Contact: pt    pt , requesting a call back soon concerning, her going to [facility], [place], [place], Friday, 10/20/2017.  thanks.

## 2017-10-25 RX ORDER — LOSARTAN POTASSIUM 50 MG/1
TABLET ORAL
Qty: 90 TABLET | Refills: 0 | Status: SHIPPED | OUTPATIENT
Start: 2017-10-25 | End: 2018-01-24 | Stop reason: SDUPTHER

## 2017-10-31 ENCOUNTER — ANTI-COAG VISIT (OUTPATIENT)
Dept: CARDIOLOGY | Facility: CLINIC | Age: 72
End: 2017-10-31
Payer: MEDICARE

## 2017-10-31 DIAGNOSIS — Z79.01 LONG TERM CURRENT USE OF ANTICOAGULANT THERAPY: Primary | ICD-10-CM

## 2017-10-31 DIAGNOSIS — I48.21 PERMANENT ATRIAL FIBRILLATION: ICD-10-CM

## 2017-10-31 LAB — INR PPP: 2.5 (ref 2–3)

## 2017-10-31 PROCEDURE — 85610 PROTHROMBIN TIME: CPT | Mod: QW,S$GLB,, | Performed by: PHARMACIST

## 2017-10-31 NOTE — PROGRESS NOTES
INR in range.  Pt denies any changes in meds/diet/health.  Will continue dose and recheck in 7 weeks

## 2017-11-08 RX ORDER — WARFARIN SODIUM 5 MG/1
TABLET ORAL
Qty: 135 TABLET | Refills: 3 | Status: SHIPPED | OUTPATIENT
Start: 2017-11-08 | End: 2018-08-27 | Stop reason: SDUPTHER

## 2017-11-14 ENCOUNTER — HOSPITAL ENCOUNTER (OUTPATIENT)
Dept: RADIOLOGY | Facility: HOSPITAL | Age: 72
Discharge: HOME | End: 2017-11-14
Attending: INTERNAL MEDICINE
Payer: MEDICARE

## 2017-11-14 ENCOUNTER — OFFICE VISIT (OUTPATIENT)
Dept: CARDIOLOGY | Facility: CLINIC | Age: 72
End: 2017-11-14
Payer: MEDICARE

## 2017-11-14 VITALS
WEIGHT: 182.56 LBS | BODY MASS INDEX: 30.42 KG/M2 | DIASTOLIC BLOOD PRESSURE: 63 MMHG | SYSTOLIC BLOOD PRESSURE: 141 MMHG | OXYGEN SATURATION: 92 % | HEART RATE: 95 BPM | HEIGHT: 65 IN

## 2017-11-14 DIAGNOSIS — E66.9 OBESITY, CLASS I, BMI 30-34.9: ICD-10-CM

## 2017-11-14 DIAGNOSIS — I50.33 ACUTE ON CHRONIC DIASTOLIC HEART FAILURE: ICD-10-CM

## 2017-11-14 DIAGNOSIS — I15.2 HYPERTENSION ASSOCIATED WITH DIABETES: ICD-10-CM

## 2017-11-14 DIAGNOSIS — E11.59 HYPERTENSION ASSOCIATED WITH DIABETES: ICD-10-CM

## 2017-11-14 DIAGNOSIS — I70.0 ABDOMINAL AORTIC ATHEROSCLEROSIS: ICD-10-CM

## 2017-11-14 DIAGNOSIS — I48.19 PERSISTENT ATRIAL FIBRILLATION: ICD-10-CM

## 2017-11-14 DIAGNOSIS — K68.3 RETROPERITONEAL HEMATOMA: ICD-10-CM

## 2017-11-14 DIAGNOSIS — Z79.01 LONG TERM CURRENT USE OF ANTICOAGULANT THERAPY: ICD-10-CM

## 2017-11-14 DIAGNOSIS — I27.20 PULMONARY HYPERTENSION: Primary | ICD-10-CM

## 2017-11-14 DIAGNOSIS — I27.20 PULMONARY HYPERTENSION: ICD-10-CM

## 2017-11-14 DIAGNOSIS — I42.8 NONISCHEMIC CARDIOMYOPATHY: ICD-10-CM

## 2017-11-14 PROCEDURE — 99999 PR PBB SHADOW E&M-EST. PATIENT-LVL IV: CPT | Mod: PBBFAC,,, | Performed by: INTERNAL MEDICINE

## 2017-11-14 PROCEDURE — 99499 UNLISTED E&M SERVICE: CPT | Mod: S$GLB,,, | Performed by: INTERNAL MEDICINE

## 2017-11-14 PROCEDURE — 71020 XR CHEST PA AND LATERAL: CPT | Mod: 26,,, | Performed by: RADIOLOGY

## 2017-11-14 PROCEDURE — 71020 XR CHEST PA AND LATERAL: CPT | Mod: TC

## 2017-11-14 PROCEDURE — 99215 OFFICE O/P EST HI 40 MIN: CPT | Mod: S$GLB,,, | Performed by: INTERNAL MEDICINE

## 2017-11-14 NOTE — PATIENT INSTRUCTIONS
Stop amlodipine  Pt to keep log of blood pressure/heart rate and bring in next visit  Check CXR, CMP, cbc, Mg, BNP today  Continue lasix 80 mg in morning, 40 mg in evening  Continue low salt diet  Follow up in 2 weeks  Plan for left and right heart cath

## 2017-11-14 NOTE — PROGRESS NOTES
[facility]    CC: Follow up    Patient ID: Eva Corona is a 71 y.o. female with a past medical history of DM2, Afib s/p ablation (on coumadin), Non-ischemic cardiomyopathy (EF 35-40%), HTN, HLD, who presents for a follow up appointment.  Pertinent history/events are as follows:       -Pt was followed by Dr. Harden in the past.    -Admitted with PAF with RVR and CHF on 1/30/14- underwent successful electrical cardioversion and was placed on sotalol.    -Echo from 11/30/2015 shows EF:35-40% (unchanged from 6/19/2015); Mildly to moderately enlarged aortic root.    -Underwent cardiac catherization on 6/3/2015: RHC showed increased right heart pressures: RV:60/14, PAP:63/25. Coronary angiogram revealed normal coronaries and normal LV function.    -Underwent Afib ablation on 6/18/2015.    -At our initial clinic visit on 10/10/2016 BP noted to be elevated at 152/74.  Blood pressure also noted to be difficult to control and not at goal in past visits with Dr. Harden.  Pt was continue on current regimen and instructed to bring in log of blood pressure/heart rate next visit    -At 11/10/2016 clinic visit, BP remained elevated in 150's systolic.  Increased Toprol XL to 100 mg daily for improved blood pressure control.  Continue Norvasc 5 mg daily.  Pt instructed to bring in bp machine with her next visit.      -At follow up clinic visit on 12/9/2016, pt stated she was unable to tolerate the increase in Toprol XL to 100 mg daily due to feeling weak and tired.  She went back to 50 mg daily, and now feels better on this dosage.  BP remains elevated.  Increase Norvasc to 10 mg daily.  Continue Toprol XL 50 mg daily.  Pt 's blood pressure has been difficult to control for several years per chart review.  Will continue to make small changes to her regimen as she tolerates them.   Pt instructed to eat a low salt diet as well.     -At 1/26/2017 clinic visit, Mrs. Corona reported blood pressures better controlled at  home.  She has not been able to tolerate small changes to her regimen.  Will continue current regimen and monitor her home blood pressure readings.  Continue a low salt diet and weight loss.       -At 4/27/2017 clinic visit, pt reported blood pressures have been stable and mainly in 130's at home.  She has no chest pain, significant SOB, LE edema, palpitations, syncope or TIA symptoms.  Pt continued on current regimen.     -On 7/25/2017, pt presented to [facility] ED with worsening shortness of breath.  Lasix was increased from 40 mg twice a day to 80 mg in the morning and 40 mg at night.    -On 8/1/2017, pt was seen in clinic by Dr. London.  She reported doing well with increased dose of Lasix.  She was eating out a lot before this episode.  Likely the increased salt in her diet led to this decompensation.  She was started on Losartan 50 mg daily for improved blood pressure control.  Pt also noted to be taking lasix at 40 mg bid.    -At clinic visit on 8/31/2017, Ms. Corona stated she's doing better.  Still has occasional SOB which occurs sporadically at rest and on exertion. Taking all medications as prescribed with no issues.  Maintaining a low salt diet.   Pt states she uses 2L supplemental oxygen at night.  States she underwent extensive pulmonary workup several years ago, but no definitive diagnosis was obtained.  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    Plan: Regarding SOB, Will refer to Pulmonary  (Dr. Asif) for evaluation.  Regarding non-ischemic cardiomyopathy, pt appears well compensated on exam.  Continue current medication regimen.  Repeat echo prior to next visit.    -Pt presented for outpatient echo on 10/19/2017.  Noted to have significant shortness of breath for the previous 3-5 days.  Echo showed reduction in EF to 12% from 35-40% on echo on 11/30/2015 (EF was 24% by LV gram on 6/3/2015 with elevated right heart pressures).  Lasix increased to 80 mg  bid for 2 days, then 80 mg in the morning and 40 mg in the evening.  I instructed Ms. Corona to report to the ED for evaluation on 10/20/2017, but she was not admitted.  CXR showed no acute abnormalities.  BNP elevated at 621.  Cr 0.9, K 3.8.  LFT's within normal limits.        HPI:  Mrs. Corona states her breathing and LE edema has significantly improved with lasix 80 mg in the morning and 40 mg in the evening.  She reports no chest pain, palpitations, claudication, TIA symptoms or syncope.  Taking all medications as prescribed.  Pt appears better compensated and is able to lie flat on exam.  JVD mildly elevated.  Lungs are clear with trace LE edema.   Blood pressure today is 141/63.    Past Medical History:   Diagnosis Date    Anticoagulant long-term use     Atrial fibrillation     Cataract     CHF (congestive heart failure)     Diabetes mellitus     Encounter for blood transfusion     Hyperlipidemia     Hypertension     Hypothyroidism     On home oxygen therapy     2L, nightly    S/P ablation of atrial fibrillation 7/22/2015    Shortness of breath on exertion     Thyroid disease     Type 2 diabetes mellitus      Past Surgical History:   Procedure Laterality Date    CARDIAC CATHETERIZATION Right     CARDIAC ELECTROPHYSIOLOGY MAPPING AND ABLATION      COLONOSCOPY      EYE SURGERY Bilateral     cataract, implants    HYSTERECTOMY      TVH, ovaries intact, benign      Review of patient's allergies indicates:   Allergen Reactions    Codeine Nausea And Vomiting    Neuromuscular blockers, steroidal      Other reaction(s): Unknown    Morphine Nausea And Vomiting       Current Outpatient Prescriptions:     acetaminophen (TYLENOL) 325 MG tablet, Take 2 tablets (650 mg total) by mouth every 6 (six) hours as needed for Pain., Disp: , Rfl: 0    amlodipine (NORVASC) 5 MG tablet, TAKE 1 TABLET (5 MG TOTAL) BY MOUTH ONCE DAILY., Disp: 90 tablet, Rfl: 3    ascorbic acid (VITAMIN C) 1000 MG tablet, Take  1,000 mg by mouth once daily., Disp: , Rfl:     atorvastatin (LIPITOR) 10 MG tablet, TAKE 1 TABLET (10 MG TOTAL) BY MOUTH ONCE DAILY., Disp: 90 tablet, Rfl: 3    COCONUT OIL ORAL, Take by mouth., Disp: , Rfl:     fluticasone (FLONASE) 50 mcg/actuation nasal spray, USE 2 SPRAYS IN EACH NOSTRIL EVERY EVENING, Disp: 48 g, Rfl: 3    furosemide (LASIX) 40 MG tablet, Take 80 mg in the morning and 40 mg in the evening. (Patient taking differently: 40 mg 2 (two) times daily. ), Disp: 120 tablet, Rfl: 0    glimepiride (AMARYL) 4 MG tablet, TAKE 2 TABLETS EVERY DAY WITH BREAKFAST, Disp: 180 tablet, Rfl: 3    lansoprazole (PREVACID SOLUTAB) 30 MG disintegrating tablet, Take 30 mg by mouth once daily., Disp: , Rfl:     levothyroxine (SYNTHROID) 75 MCG tablet, TAKE 1 TABLET EVERY DAY  BEFORE  BREAKFAST, Disp: 90 tablet, Rfl: 3    losartan (COZAAR) 50 MG tablet, TAKE ONE TABLET BY MOUTH EVERY DAY, Disp: 90 tablet, Rfl: 0    metformin (GLUCOPHAGE) 500 MG tablet, Take 500 mg by mouth. Take one tablet on Sun, Tues, Thurs, and Saturday.  Take 1 1/2 tablets  on  Monday, Wed, and Friday, Disp: , Rfl:     MULTIVIT-MIN/FA/CA CARB/VIT K (ONE-A-DAY WOMEN'S 50+ ORAL), Take 1 tablet by mouth once daily. , Disp: , Rfl:     potassium chloride SA (K-DUR,KLOR-CON) 20 MEQ tablet, Take 1 tablet (20 mEq total) by mouth once daily., Disp: 30 tablet, Rfl: 11    VITAMIN A ORAL, Take 1 tablet by mouth once daily. , Disp: , Rfl:     warfarin (COUMADIN) 5 MG tablet, TAKE 1 AND 1/2 TABLETS (7.5 MG)  ON MON, WED, FRI. AND TAKE 1 TABLET (5 MG) ALL OTHER DAYS., Disp: 135 tablet, Rfl: 3    gabapentin (NEURONTIN) 300 MG capsule, Take 300 mg by mouth every evening. Take 3 capsules every evening, Disp: , Rfl:       Social History     Social History    Marital status:      Spouse name: N/A    Number of children: N/A    Years of education: N/A     Occupational History    Not on file.     Social History Main Topics    Smoking status: Never  "Smoker    Smokeless tobacco: Never Used    Alcohol use No    Drug use: No    Sexual activity: No     Other Topics Concern    Not on file     Social History Narrative    No narrative on file     Family History   Problem Relation Age of Onset    Diabetes Mother     Heart disease Sister     No Known Problems Brother     No Known Problems Daughter     No Known Problems Son     No Known Problems Brother     No Known Problems Brother     No Known Problems Son     Cancer Neg Hx          Review of Systems  Constitution: Positive for fatigue.  HENT: Denies headaches or blurry vision.  Cardiovascular: Denies chest pain or irregular heart beat.  Respiratory: Positive for shortness of breath.  Gastrointestinal: Denies abdominal pain, nausea, or vomiting.  Musculoskeletal: Denies muscle cramps.  Neurological: Denies dizziness or focal weakness.  Psychiatric/Behavioral: Normal mental status.  Hematologic/Lymphatic: Denies bleeding problem or easy bruising/bleeding.  Skin: Denies rash or suspicious lesions    Physical Examination  BP (!) 141/63 (BP Location: Left arm)   Pulse 95   Ht 5' 5" (1.651 m)   Wt 82.8 kg (182 lb 8.7 oz)   LMP  (LMP Unknown)   SpO2 (!) 92%   BMI 30.38 kg/m²     Constitutional: No acute distress, conversant  HEENT: Sclera anicteric, Pupils equal, round and reactive to light, extraocular motions intact, Oropharynx clear  Neck: JVD 8-10 cm, no carotid bruits  Cardiovascular: regular rate and rhythm, no murmur, rubs or gallops, normal S1/S2  Pulmonary: Clear to auscultation bilaterally  Abdominal: Abdomen soft, nontender, nondistended, positive bowel sounds  Extremities: No lower extremity edema,   Pulses:  Carotid pulses are 2+ on the right side, and 2+ on the left side.  Radial pulses are 2+ on the right side, and 2+ on the left side.   Femoral pulses are 2+ on the right side, and 2+ on the left side.  Skin: No ecchymosis, erythema, or ulcers  Psych: Alert and oriented x 3, appropriate " affect  Neuro: CNII-XII intact, no focal deficits    Labs:  Results for MUMTAZ MONTALVO (MRN [id number]) as of 12/9/2016 17:27   Ref. Range 12/2/2016 09:38   Cholesterol Latest Ref Range: 120 - 199 mg/dL 112 (L)   HDL Latest Ref Range: 40 - 75 mg/dL 26 (L)   LDL Cholesterol Latest Ref Range: 63.0 - 159.0 mg/dL 57.4 (L)   Total Cholesterol/HDL Ratio Latest Ref Range: 2.0 - 5.0  4.3   Triglycerides Latest Ref Range: 30 - 150 mg/dL 143       Echo 11/30/2015:  CONCLUSIONS     1 - Mildly to moderately enlarged aortic root.     2 - Mild left atrial enlargement.     3 - Mild left ventricular enlargement.     4 - Eccentric hypertrophy.     5 - Moderately depressed left ventricular systolic function (EF 35-40%).     6 - Low normal to mildly depressed right ventricular systolic function .     7 - No significant change from Echo on 6/19/2015.     Assessment/Plan:   Mumtaz Montalvo is a 72 y.o. female with a past medical history of DM2, Afib s/p ablation (on coumadin), Non-ischemic cardiomyopathy (EF 35-40%), HTN, HLD, who presents for follow up.     1. Non-ischemic Cardiomyopathy with SOB- Pt presents with continued SOB, which has improved since 10/19/2017. Echo from 10/19/2017 shows reduction in EF to 12% from 35-40% on echo on 11/30/2015 (EF was 24% by LV gram on 6/3/2015 with elevated right heart pressures).  Pt appears better compensated on exam.  Discontinue amlodipine given significant drop in EF.  Continue lasix 80 mg in the morning and 40 mg in the evening.  Continue losartan and toprol xl.  Check CMP, Mg, BNP, CBC today.  Pt to keep log of blood pressure/heart rate.  Will try to add spironolactone depending on Cr, K.  Continue low salt diet.  Continue 2L supplemental oxygen at night.  Will optimize clinical status and plan for left and right heart cath to evaluate further.      2. PAF s/p ablation- Continue anticoagulation with coumadin.      3. HLD- Continue atorvastatin 10 mg daily.    Follow up in 2 weeks    Total  duration of face to face visit time 30 minutes.  Total time spent counseling greater than fifty percent of total visit time.  Counseling included discussion regarding imaging findings, diagnosis, possibilities, treatment options, risks and benefits.      Justus Cleveland MD, PhD  Interventional Cardiology

## 2017-11-28 ENCOUNTER — OFFICE VISIT (OUTPATIENT)
Dept: CARDIOLOGY | Facility: CLINIC | Age: 72
End: 2017-11-28
Payer: MEDICARE

## 2017-11-28 VITALS
HEART RATE: 96 BPM | DIASTOLIC BLOOD PRESSURE: 64 MMHG | WEIGHT: 183.88 LBS | BODY MASS INDEX: 30.64 KG/M2 | HEIGHT: 65 IN | OXYGEN SATURATION: 94 % | SYSTOLIC BLOOD PRESSURE: 132 MMHG

## 2017-11-28 DIAGNOSIS — I42.8 OTHER CARDIOMYOPATHY: ICD-10-CM

## 2017-11-28 DIAGNOSIS — Z79.01 LONG TERM CURRENT USE OF ANTICOAGULANT THERAPY: ICD-10-CM

## 2017-11-28 DIAGNOSIS — R09.89 CARDIAC LV EJECTION FRACTION 10-20%: ICD-10-CM

## 2017-11-28 DIAGNOSIS — I42.8 NONISCHEMIC CARDIOMYOPATHY: ICD-10-CM

## 2017-11-28 DIAGNOSIS — I70.0 ABDOMINAL AORTIC ATHEROSCLEROSIS: ICD-10-CM

## 2017-11-28 DIAGNOSIS — E11.59 HYPERTENSION ASSOCIATED WITH DIABETES: ICD-10-CM

## 2017-11-28 DIAGNOSIS — I50.33 ACUTE ON CHRONIC DIASTOLIC HEART FAILURE: ICD-10-CM

## 2017-11-28 DIAGNOSIS — I15.2 HYPERTENSION ASSOCIATED WITH DIABETES: ICD-10-CM

## 2017-11-28 DIAGNOSIS — I48.19 PERSISTENT ATRIAL FIBRILLATION: ICD-10-CM

## 2017-11-28 DIAGNOSIS — E66.9 OBESITY, CLASS I, BMI 30-34.9: ICD-10-CM

## 2017-11-28 DIAGNOSIS — I27.20 PULMONARY HYPERTENSION: Primary | ICD-10-CM

## 2017-11-28 PROCEDURE — 99499 UNLISTED E&M SERVICE: CPT | Mod: S$GLB,,, | Performed by: INTERNAL MEDICINE

## 2017-11-28 PROCEDURE — 99999 PR PBB SHADOW E&M-EST. PATIENT-LVL V: CPT | Mod: PBBFAC,,, | Performed by: INTERNAL MEDICINE

## 2017-11-28 PROCEDURE — 99215 OFFICE O/P EST HI 40 MIN: CPT | Mod: S$GLB,,, | Performed by: INTERNAL MEDICINE

## 2017-11-28 RX ORDER — SPIRONOLACTONE 25 MG/1
25 TABLET ORAL DAILY
Qty: 30 TABLET | Refills: 11 | Status: SHIPPED | OUTPATIENT
Start: 2017-11-28 | End: 2018-12-12 | Stop reason: SDUPTHER

## 2017-11-28 NOTE — PROGRESS NOTES
[facility]    CC: Follow up    Patient ID: Eav Corona is a 71 y.o. female with a past medical history of DM2, Afib s/p ablation (on coumadin), Non-ischemic cardiomyopathy (EF 35-40%), HTN, HLD, who presents for a follow up appointment.  Pertinent history/events are as follows:       -Pt was followed by Dr. Harden in the past.  -Admitted with PAF with RVR and CHF on 1/30/14- underwent successful electrical cardioversion and was placed on sotalol.  -Echo from 11/30/2015 shows EF:35-40% (unchanged from 6/19/2015); Mildly to moderately enlarged aortic root.  -Underwent cardiac catherization on 6/3/2015: RHC showed increased right heart pressures: RV:60/14, PAP:63/25. Coronary angiogram revealed normal coronaries and normal LV function.  -Underwent Afib ablation on 6/18/2015.    -At our initial clinic visit on 10/10/2016 BP noted to be elevated at 152/74.  Blood pressure also noted to be difficult to control and not at goal in past visits with Dr. Harden.  Pt was continue on current regimen and instructed to bring in log of blood pressure/heart rate next visit    -At 11/10/2016 clinic visit, BP remained elevated in 150's systolic.  Increased Toprol XL to 100 mg daily for improved blood pressure control.  Continue Norvasc 5 mg daily.  Pt instructed to bring in bp machine with her next visit.      -At follow up clinic visit on 12/9/2016, pt stated she was unable to tolerate the increase in Toprol XL to 100 mg daily due to feeling weak and tired.  She went back to 50 mg daily, and now feels better on this dosage.  BP remains elevated.  Increase Norvasc to 10 mg daily.  Continue Toprol XL 50 mg daily.  Pt 's blood pressure has been difficult to control for several years per chart review.  Will continue to make small changes to her regimen as she tolerates them.   Pt instructed to eat a low salt diet as well.     -At 1/26/2017 clinic visit, Mrs. Corona reported blood pressures better controlled at home.  She  has not been able to tolerate small changes to her regimen.  Will continue current regimen and monitor her home blood pressure readings.  Continue a low salt diet and weight loss.       -At 4/27/2017 clinic visit, pt reported blood pressures have been stable and mainly in 130's at home.  She has no chest pain, significant SOB, LE edema, palpitations, syncope or TIA symptoms.  Pt continued on current regimen.     -On 7/25/2017, pt presented to [facility] ED with worsening shortness of breath.  Lasix was increased from 40 mg twice a day to 80 mg in the morning and 40 mg at night.    -On 8/1/2017, pt was seen in clinic by Dr. London.  She reported doing well with increased dose of Lasix.  She was eating out a lot before this episode.  Likely the increased salt in her diet led to this decompensation.  She was started on Losartan 50 mg daily for improved blood pressure control.  Pt also noted to be taking lasix at 40 mg bid.    -At clinic visit on 8/31/2017, Ms. Corona stated she's doing better.  Still has occasional SOB which occurs sporadically at rest and on exertion. Taking all medications as prescribed with no issues.  Maintaining a low salt diet.   Pt states she uses 2L supplemental oxygen at night.  States she underwent extensive pulmonary workup several years ago, but no definitive diagnosis was obtained.  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    Plan: Regarding SOB, Will refer to Pulmonary  (Dr. Asif) for evaluation.  Regarding non-ischemic cardiomyopathy, pt appears well compensated on exam.  Continue current medication regimen.  Repeat echo prior to next visit.    -Pt presented for outpatient echo on 10/19/2017.  Noted to have significant shortness of breath for the previous 3-5 days.  Echo showed reduction in EF to 12% from 35-40% on echo on 11/30/2015 (EF was 24% by LV gram on 6/3/2015 with elevated right heart pressures).  Lasix increased to 80 mg bid for 2  days, then 80 mg in the morning and 40 mg in the evening.  I instructed Ms. Corona to report to the ED for evaluation on 10/20/2017, but she was not admitted.  CXR showed no acute abnormalities.  BNP elevated at 621.  Cr 0.9, K 3.8.  LFT's within normal limits.        -At follow up clinic visit on 11/14/2017, Mrs. Corona stated her breathing and LE edema has significantly improved with lasix 80 mg in the morning and 40 mg in the evening.  She reports no chest pain, palpitations, claudication, TIA symptoms or syncope.  Taking all medications as prescribed.  Pt appears better compensated and is able to lie flat on exam.  JVD mildly elevated.  Lungs are clear with trace LE edema.   Blood pressure today is 141/63.      HPI:  Mrs. Corona states she's feeling much better.  Reports she is no longer SOB.  She has no chest pain, palpitations, TIA, or syncope.  Labs from 11/14/2017 show , Cr 0.8, K 3.8.  CXR from 11/14/2017 shows clear lungs with no pulmonary edema or infiltrate.  Bood pressure have been 130's/60's-70's at home.     Past Medical History:   Diagnosis Date    Anticoagulant long-term use     Atrial fibrillation     Cataract     CHF (congestive heart failure)     Diabetes mellitus     Encounter for blood transfusion     Hyperlipidemia     Hypertension     Hypothyroidism     On home oxygen therapy     2L, nightly    S/P ablation of atrial fibrillation 7/22/2015    Shortness of breath on exertion     Thyroid disease     Type 2 diabetes mellitus      Past Surgical History:   Procedure Laterality Date    CARDIAC CATHETERIZATION Right     CARDIAC ELECTROPHYSIOLOGY MAPPING AND ABLATION      COLONOSCOPY      EYE SURGERY Bilateral     cataract, implants    HYSTERECTOMY      TVH, ovaries intact, benign      Review of patient's allergies indicates:   Allergen Reactions    Codeine Nausea And Vomiting    Neuromuscular blockers, steroidal      Other reaction(s): Unknown    Morphine Nausea And  Vomiting       Current Outpatient Prescriptions:     acetaminophen (TYLENOL) 325 MG tablet, Take 2 tablets (650 mg total) by mouth every 6 (six) hours as needed for Pain., Disp: , Rfl: 0    ascorbic acid (VITAMIN C) 1000 MG tablet, Take 1,000 mg by mouth once daily., Disp: , Rfl:     atorvastatin (LIPITOR) 10 MG tablet, TAKE 1 TABLET (10 MG TOTAL) BY MOUTH ONCE DAILY., Disp: 90 tablet, Rfl: 3    COCONUT OIL ORAL, Take by mouth., Disp: , Rfl:     fluticasone (FLONASE) 50 mcg/actuation nasal spray, USE 2 SPRAYS IN EACH NOSTRIL EVERY EVENING, Disp: 48 g, Rfl: 3    furosemide (LASIX) 40 MG tablet, Take 80 mg in the morning and 40 mg in the evening. (Patient taking differently: 40 mg 2 (two) times daily. ), Disp: 120 tablet, Rfl: 0    glimepiride (AMARYL) 4 MG tablet, TAKE 2 TABLETS EVERY DAY WITH BREAKFAST, Disp: 180 tablet, Rfl: 3    levothyroxine (SYNTHROID) 75 MCG tablet, TAKE 1 TABLET EVERY DAY  BEFORE  BREAKFAST, Disp: 90 tablet, Rfl: 3    losartan (COZAAR) 50 MG tablet, TAKE ONE TABLET BY MOUTH EVERY DAY, Disp: 90 tablet, Rfl: 0    metformin (GLUCOPHAGE) 500 MG tablet, Take 500 mg by mouth. Take one tablet on Sun, Tues, Thurs, and Saturday.  Take 1 1/2 tablets  on  Monday, Wed, and Friday, Disp: , Rfl:     MULTIVIT-MIN/FA/CA CARB/VIT K (ONE-A-DAY WOMEN'S 50+ ORAL), Take 1 tablet by mouth once daily. , Disp: , Rfl:     potassium chloride SA (K-DUR,KLOR-CON) 20 MEQ tablet, Take 1 tablet (20 mEq total) by mouth once daily., Disp: 30 tablet, Rfl: 11    VITAMIN A ORAL, Take 1 tablet by mouth once daily. , Disp: , Rfl:     warfarin (COUMADIN) 5 MG tablet, TAKE 1 AND 1/2 TABLETS (7.5 MG)  ON MON, WED, FRI. AND TAKE 1 TABLET (5 MG) ALL OTHER DAYS., Disp: 135 tablet, Rfl: 3    gabapentin (NEURONTIN) 300 MG capsule, Take 300 mg by mouth every evening. Take 3 capsules every evening, Disp: , Rfl:     lansoprazole (PREVACID SOLUTAB) 30 MG disintegrating tablet, Take 30 mg by mouth once daily., Disp: , Rfl:  "      Social History     Social History    Marital status:      Spouse name: N/A    Number of children: N/A    Years of education: N/A     Occupational History    Not on file.     Social History Main Topics    Smoking status: Never Smoker    Smokeless tobacco: Never Used    Alcohol use No    Drug use: No    Sexual activity: No     Other Topics Concern    Not on file     Social History Narrative    No narrative on file     Family History   Problem Relation Age of Onset    Diabetes Mother     Heart disease Sister     No Known Problems Brother     No Known Problems Daughter     No Known Problems Son     No Known Problems Brother     No Known Problems Brother     No Known Problems Son     Cancer Neg Hx          Review of Systems  Constitution: Positive for fatigue.  HENT: Denies headaches or blurry vision.  Cardiovascular: Denies chest pain or irregular heart beat.  Respiratory: Positive for shortness of breath.  Gastrointestinal: Denies abdominal pain, nausea, or vomiting.  Musculoskeletal: Denies muscle cramps.  Neurological: Denies dizziness or focal weakness.  Psychiatric/Behavioral: Normal mental status.  Hematologic/Lymphatic: Denies bleeding problem or easy bruising/bleeding.  Skin: Denies rash or suspicious lesions    Physical Examination  /64 (BP Location: Left arm, Patient Position: Sitting)   Pulse 96   Ht 5' 5" (1.651 m)   Wt 83.4 kg (183 lb 13.8 oz)   LMP  (LMP Unknown)   SpO2 (!) 94%   BMI 30.60 kg/m²     Constitutional: No acute distress, conversant  HEENT: Sclera anicteric, Pupils equal, round and reactive to light, extraocular motions intact, Oropharynx clear  Neck: JVD 8-10 cm, no carotid bruits  Cardiovascular: regular rate and rhythm, no murmur, rubs or gallops, normal S1/S2  Pulmonary: Clear to auscultation bilaterally  Abdominal: Abdomen soft, nontender, nondistended, positive bowel sounds  Extremities: No lower extremity edema,   Pulses:  Carotid pulses are " 2+ on the right side, and 2+ on the left side.  Radial pulses are 2+ on the right side, and 2+ on the left side.   Femoral pulses are 2+ on the right side, and 2+ on the left side.  Skin: No ecchymosis, erythema, or ulcers  Psych: Alert and oriented x 3, appropriate affect  Neuro: CNII-XII intact, no focal deficits    Labs:  Results for MUMTAZ MONTALVO (MRN [id number]) as of 12/9/2016 17:27   Ref. Range 12/2/2016 09:38   Cholesterol Latest Ref Range: 120 - 199 mg/dL 112 (L)   HDL Latest Ref Range: 40 - 75 mg/dL 26 (L)   LDL Cholesterol Latest Ref Range: 63.0 - 159.0 mg/dL 57.4 (L)   Total Cholesterol/HDL Ratio Latest Ref Range: 2.0 - 5.0  4.3   Triglycerides Latest Ref Range: 30 - 150 mg/dL 143       Echo 10/19/2017:  CONCLUSIONS     1 - Mildly enlarged ascending aorta.     2 - Biatrial enlargement.     3 - Eccentric hypertrophy.     4 - Severely depressed left ventricular systolic function (EF 10-15%).     5 - Impaired LV relaxation, elevated LAP (grade 2 diastolic dysfunction).     6 - Right ventricular enlargement with mildly depressed systolic function.     7 - Moderate to severe tricuspid regurgitation.     8 - Moderate mitral regurgitation.     9 - Mild aortic regurgitation.     10 - Pulmonary hypertension. The estimated PA systolic pressure is 66 mmHg.     11 - Intermediate central venous pressure.     12 - Significant change from echo on 11/30/2015.      Echo 11/30/2015:  CONCLUSIONS     1 - Mildly to moderately enlarged aortic root.     2 - Mild left atrial enlargement.     3 - Mild left ventricular enlargement.     4 - Eccentric hypertrophy.     5 - Moderately depressed left ventricular systolic function (EF 35-40%).     6 - Low normal to mildly depressed right ventricular systolic function .     7 - No significant change from Echo on 6/19/2015.     Assessment/Plan:   Mumtaz Montalvo is a 72 y.o. female with a past medical history of DM2, Afib s/p ablation (on coumadin), Non-ischemic cardiomyopathy (EF 35-40%),  HTN, HLD, who presents for follow up.     1. Non-ischemic Cardiomyopathy with SOB- Symptoms of SOB and LE edema now significantly improved and pt appears much better compensated on exam today.  Can lie flat without difficulty.  Echo from 10/19/2017 shows reduction in EF to 12% from 35-40% on echo on 11/30/2015 (EF was 24% by LV gram on 6/3/2015 with elevated right heart pressures).  Continue lasix 80 mg in the morning and 40 mg in the evening.  Continue losartan 50 mg daily and toprol xl 50 mg daily.  Schedule left and right heart cath to evaluate further. Will hold off on adding spironolactone until after cath.   Continue low salt diet.  Continue 2L supplemental oxygen at night.  Hold coumadin prior to cath.     2. PAF s/p ablation- Continue anticoagulation with coumadin.      3. HLD- Continue atorvastatin 10 mg daily.    Follow up 1 week after cath    Total duration of face to face visit time 30 minutes.  Total time spent counseling greater than fifty percent of total visit time.  Counseling included discussion regarding imaging findings, diagnosis, possibilities, treatment options, risks and benefits.      Justus Cleveland MD, PhD  Interventional Cardiology

## 2017-11-28 NOTE — PATIENT INSTRUCTIONS
Continue current medication regimen  Schedule for left and right heart cath  Hold coumadin prior to cath

## 2017-11-29 NOTE — PROGRESS NOTES
Per message today:  From: Katia Molina LPN   Sent: 11/29/2017   8:26 AM   To: [facility] Coumadin Staff     Good morning,     Dr. Cleveland has scheduled Ms. Corona for a right and left heart cath on 12/14/2017. Can you please give pt instructions on stopping her coumadin prior to her 12/14/17 procedure per Dr. Cleveland?     Thank you,   Katia

## 2017-12-19 ENCOUNTER — ANTI-COAG VISIT (OUTPATIENT)
Dept: CARDIOLOGY | Facility: CLINIC | Age: 72
End: 2017-12-19
Payer: MEDICARE

## 2017-12-19 DIAGNOSIS — Z79.01 LONG TERM CURRENT USE OF ANTICOAGULANT THERAPY: Primary | ICD-10-CM

## 2017-12-19 DIAGNOSIS — I48.0 PAROXYSMAL ATRIAL FIBRILLATION: ICD-10-CM

## 2017-12-19 LAB — INR PPP: 1.6 (ref 2–3)

## 2017-12-19 PROCEDURE — 99211 OFF/OP EST MAY X REQ PHY/QHP: CPT | Mod: 25,S$GLB,, | Performed by: PHARMACIST

## 2017-12-19 PROCEDURE — 85610 PROTHROMBIN TIME: CPT | Mod: QW,S$GLB,, | Performed by: PHARMACIST

## 2017-12-19 NOTE — PROGRESS NOTES
INr subtherapeutic today.  Pt did not restart warfarin thur after LHC, she restarted fri.  No changed reported and pt is eating well.  Boost today & resume, recheck in 3 weeks

## 2017-12-28 ENCOUNTER — LAB VISIT (OUTPATIENT)
Dept: LAB | Facility: HOSPITAL | Age: 72
End: 2017-12-28
Attending: INTERNAL MEDICINE
Payer: MEDICARE

## 2017-12-28 ENCOUNTER — OFFICE VISIT (OUTPATIENT)
Dept: CARDIOLOGY | Facility: CLINIC | Age: 72
End: 2017-12-28
Payer: MEDICARE

## 2017-12-28 VITALS
OXYGEN SATURATION: 94 % | HEIGHT: 65 IN | BODY MASS INDEX: 30.59 KG/M2 | DIASTOLIC BLOOD PRESSURE: 62 MMHG | SYSTOLIC BLOOD PRESSURE: 137 MMHG | WEIGHT: 183.63 LBS | HEART RATE: 98 BPM

## 2017-12-28 DIAGNOSIS — R09.89 CARDIAC LV EJECTION FRACTION 10-20%: ICD-10-CM

## 2017-12-28 DIAGNOSIS — E78.5 HYPERLIPIDEMIA ASSOCIATED WITH TYPE 2 DIABETES MELLITUS: ICD-10-CM

## 2017-12-28 DIAGNOSIS — E11.69 HYPERLIPIDEMIA ASSOCIATED WITH TYPE 2 DIABETES MELLITUS: ICD-10-CM

## 2017-12-28 DIAGNOSIS — I42.8 OTHER CARDIOMYOPATHY: ICD-10-CM

## 2017-12-28 DIAGNOSIS — I15.2 HYPERTENSION ASSOCIATED WITH DIABETES: ICD-10-CM

## 2017-12-28 DIAGNOSIS — Z79.01 LONG TERM CURRENT USE OF ANTICOAGULANT THERAPY: ICD-10-CM

## 2017-12-28 DIAGNOSIS — I70.0 ABDOMINAL AORTIC ATHEROSCLEROSIS: Primary | ICD-10-CM

## 2017-12-28 DIAGNOSIS — E11.59 HYPERTENSION ASSOCIATED WITH DIABETES: ICD-10-CM

## 2017-12-28 DIAGNOSIS — E66.9 OBESITY, CLASS I, BMI 30-34.9: ICD-10-CM

## 2017-12-28 LAB
ALBUMIN SERPL BCP-MCNC: 4.1 G/DL
ALP SERPL-CCNC: 81 U/L
ALT SERPL W/O P-5'-P-CCNC: 22 U/L
ANION GAP SERPL CALC-SCNC: 13 MMOL/L
AST SERPL-CCNC: 23 U/L
BILIRUB SERPL-MCNC: 0.4 MG/DL
BUN SERPL-MCNC: 22 MG/DL
CALCIUM SERPL-MCNC: 9.9 MG/DL
CHLORIDE SERPL-SCNC: 100 MMOL/L
CO2 SERPL-SCNC: 30 MMOL/L
CREAT SERPL-MCNC: 0.9 MG/DL
EST. GFR  (AFRICAN AMERICAN): >60 ML/MIN/1.73 M^2
EST. GFR  (NON AFRICAN AMERICAN): >60 ML/MIN/1.73 M^2
GLUCOSE SERPL-MCNC: 43 MG/DL
POTASSIUM SERPL-SCNC: 3.9 MMOL/L
PROT SERPL-MCNC: 8 G/DL
SODIUM SERPL-SCNC: 143 MMOL/L

## 2017-12-28 PROCEDURE — 36415 COLL VENOUS BLD VENIPUNCTURE: CPT

## 2017-12-28 PROCEDURE — 99499 UNLISTED E&M SERVICE: CPT | Mod: S$GLB,,, | Performed by: INTERNAL MEDICINE

## 2017-12-28 PROCEDURE — 99215 OFFICE O/P EST HI 40 MIN: CPT | Mod: S$GLB,,, | Performed by: INTERNAL MEDICINE

## 2017-12-28 PROCEDURE — 80053 COMPREHEN METABOLIC PANEL: CPT

## 2017-12-28 PROCEDURE — 99999 PR PBB SHADOW E&M-EST. PATIENT-LVL IV: CPT | Mod: PBBFAC,,, | Performed by: INTERNAL MEDICINE

## 2017-12-28 NOTE — PROGRESS NOTES
[facility]    CC: Follow up    Patient ID: Eva Corona is a 71 y.o. female with a past medical history of DM2, Afib s/p ablation (on coumadin), Non-ischemic cardiomyopathy (EF 35-40%), HTN, HLD, who presents for a follow up appointment.  Pertinent history/events are as follows:       -Pt was followed by Dr. Harden in the past.  -Admitted with PAF with RVR and CHF on 1/30/14- underwent successful electrical cardioversion and was placed on sotalol.  -Echo from 11/30/2015 shows EF:35-40% (unchanged from 6/19/2015); Mildly to moderately enlarged aortic root.  -Underwent cardiac catherization on 6/3/2015: RHC showed increased right heart pressures: RV:60/14, PAP:63/25. Coronary angiogram revealed normal coronaries and normal LV function.  -Underwent Afib ablation on 6/18/2015.    -At our initial clinic visit on 10/10/2016 BP noted to be elevated at 152/74.  Blood pressure also noted to be difficult to control and not at goal in past visits with Dr. Harden.  Pt was continue on current regimen and instructed to bring in log of blood pressure/heart rate next visit    -At 11/10/2016 clinic visit, BP remained elevated in 150's systolic.  Increased Toprol XL to 100 mg daily for improved blood pressure control.  Continue Norvasc 5 mg daily.  Pt instructed to bring in bp machine with her next visit.      -At follow up clinic visit on 12/9/2016, pt stated she was unable to tolerate the increase in Toprol XL to 100 mg daily due to feeling weak and tired.  She went back to 50 mg daily, and now feels better on this dosage.  BP remains elevated.  Increase Norvasc to 10 mg daily.  Continue Toprol XL 50 mg daily.  Pt 's blood pressure has been difficult to control for several years per chart review.  Will continue to make small changes to her regimen as she tolerates them.   Pt instructed to eat a low salt diet as well.     -At 1/26/2017 clinic visit, Mrs. Corona reported blood pressures better controlled at home.  She  has not been able to tolerate small changes to her regimen.  Will continue current regimen and monitor her home blood pressure readings.  Continue a low salt diet and weight loss.       -At 4/27/2017 clinic visit, pt reported blood pressures have been stable and mainly in 130's at home.  She has no chest pain, significant SOB, LE edema, palpitations, syncope or TIA symptoms.  Pt continued on current regimen.     -On 7/25/2017, pt presented to [facility] ED with worsening shortness of breath.  Lasix was increased from 40 mg twice a day to 80 mg in the morning and 40 mg at night.    -On 8/1/2017, pt was seen in clinic by Dr. London.  She reported doing well with increased dose of Lasix.  She was eating out a lot before this episode.  Likely the increased salt in her diet led to this decompensation.  She was started on Losartan 50 mg daily for improved blood pressure control.  Pt also noted to be taking lasix at 40 mg bid.    -At clinic visit on 8/31/2017, Ms. Corona stated she's doing better.  Still has occasional SOB which occurs sporadically at rest and on exertion. Taking all medications as prescribed with no issues.  Maintaining a low salt diet.   Pt states she uses 2L supplemental oxygen at night.  States she underwent extensive pulmonary workup several years ago, but no definitive diagnosis was obtained.  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    Plan: Regarding SOB, Will refer to Pulmonary  (Dr. Asif) for evaluation.  Regarding non-ischemic cardiomyopathy, pt appears well compensated on exam.  Continue current medication regimen.  Repeat echo prior to next visit.    -Pt presented for outpatient echo on 10/19/2017.  Noted to have significant shortness of breath for the previous 3-5 days.  Echo showed reduction in EF to 12% from 35-40% on echo on 11/30/2015 (EF was 24% by LV gram on 6/3/2015 with elevated right heart pressures).  Lasix increased to 80 mg bid for 2  days, then 80 mg in the morning and 40 mg in the evening.  I instructed Ms. Corona to report to the ED for evaluation on 10/20/2017, but she was not admitted.  CXR showed no acute abnormalities.  BNP elevated at 621.  Cr 0.9, K 3.8.  LFT's within normal limits.        -At follow up clinic visit on 11/14/2017, Mrs. Corona stated her breathing and LE edema has significantly improved with lasix 80 mg in the morning and 40 mg in the evening.  She reports no chest pain, palpitations, claudication, TIA symptoms or syncope.  Taking all medications as prescribed.  Pt appears better compensated and is able to lie flat on exam.  JVD mildly elevated.  Lungs are clear with trace LE edema.   Blood pressure today is 141/63.    Plan: Echo from 10/19/2017 shows reduction in EF to 12% from 35-40% on echo on 11/30/2015 (EF was 24% by LV gram on 6/3/2015 with elevated right heart pressures).  Continue lasix 80 mg in the morning and 40 mg in the evening.  Continue losartan 50 mg daily and toprol xl 50 mg daily.  Schedule left and right heart cath to evaluate further. Will hold off on adding spironolactone until after cath.   Continue low salt diet.  Continue 2L supplemental oxygen at night.  Hold coumadin prior to cath.     -Left and right heart cath on shows elevated right heart pressures with non-obstructive CAD (RA 16; RV 60/11; PA 66/24; PCWP 23; CO 4.7; CI 2.5).  EF approximately 25-30% by LV gram. Findings consistent with previously diagnosed non-ischemic cardiomyopathy.     HPI:  Mrs. Corona states she's feeling much better.  Reports she is no longer SOB and is able to lie flat when sleeping.  She has no chest pain, palpitations, TIA, or syncope.  Bood pressures have been 130's/60's-70's at home.     Past Medical History:   Diagnosis Date    Anticoagulant long-term use     Atrial fibrillation     Cataract     CHF (congestive heart failure)     Diabetes mellitus     Encounter for blood transfusion     Hyperlipidemia      Hypertension     Hypothyroidism     On home oxygen therapy     2L, nightly    S/P ablation of atrial fibrillation 7/22/2015    Shortness of breath on exertion     Thyroid disease     Type 2 diabetes mellitus      Past Surgical History:   Procedure Laterality Date    CARDIAC CATHETERIZATION Right     CARDIAC ELECTROPHYSIOLOGY MAPPING AND ABLATION      COLONOSCOPY      EYE SURGERY Bilateral     cataract, implants    HYSTERECTOMY      TVH, ovaries intact, benign      Review of patient's allergies indicates:   Allergen Reactions    Codeine Nausea And Vomiting    Neuromuscular blockers, steroidal      Other reaction(s): Unknown    Morphine Nausea And Vomiting       Current Outpatient Prescriptions:     acetaminophen (TYLENOL) 325 MG tablet, Take 2 tablets (650 mg total) by mouth every 6 (six) hours as needed for Pain., Disp: , Rfl: 0    ascorbic acid (VITAMIN C) 1000 MG tablet, Take 1,000 mg by mouth once daily., Disp: , Rfl:     atorvastatin (LIPITOR) 10 MG tablet, TAKE 1 TABLET (10 MG TOTAL) BY MOUTH ONCE DAILY., Disp: 90 tablet, Rfl: 3    COCONUT OIL ORAL, Take by mouth., Disp: , Rfl:     fluticasone (FLONASE) 50 mcg/actuation nasal spray, USE 2 SPRAYS IN EACH NOSTRIL EVERY EVENING, Disp: 48 g, Rfl: 3    furosemide (LASIX) 40 MG tablet, Take 80 mg in the morning and 40 mg in the evening. (Patient taking differently: 40 mg 2 (two) times daily. ), Disp: 120 tablet, Rfl: 0    glimepiride (AMARYL) 4 MG tablet, TAKE 2 TABLETS EVERY DAY WITH BREAKFAST, Disp: 180 tablet, Rfl: 3    lansoprazole (PREVACID SOLUTAB) 30 MG disintegrating tablet, Take 30 mg by mouth once daily., Disp: , Rfl:     levothyroxine (SYNTHROID) 75 MCG tablet, TAKE 1 TABLET EVERY DAY  BEFORE  BREAKFAST, Disp: 90 tablet, Rfl: 3    losartan (COZAAR) 50 MG tablet, TAKE ONE TABLET BY MOUTH EVERY DAY, Disp: 90 tablet, Rfl: 0    metformin (GLUCOPHAGE) 500 MG tablet, Take 500 mg by mouth. Take one tablet on Sun, Tues, Thurs, and  Saturday.  Take 1 1/2 tablets  on  Monday, Wed, and Friday , Disp: , Rfl:     MULTIVIT-MIN/FA/CA CARB/VIT K (ONE-A-DAY WOMEN'S 50+ ORAL), Take 1 tablet by mouth once daily. , Disp: , Rfl:     potassium chloride SA (K-DUR,KLOR-CON) 20 MEQ tablet, Take 1 tablet (20 mEq total) by mouth once daily., Disp: 30 tablet, Rfl: 11    VITAMIN A ORAL, Take 1 tablet by mouth once daily. , Disp: , Rfl:     warfarin (COUMADIN) 5 MG tablet, TAKE 1 AND 1/2 TABLETS (7.5 MG)  ON MON, WED, FRI. AND TAKE 1 TABLET (5 MG) ALL OTHER DAYS., Disp: 135 tablet, Rfl: 3    gabapentin (NEURONTIN) 300 MG capsule, Take 300 mg by mouth every evening. Take 3 capsules every evening, Disp: , Rfl:     spironolactone (ALDACTONE) 25 MG tablet, Take 1 tablet (25 mg total) by mouth once daily., Disp: 30 tablet, Rfl: 11      Social History     Social History    Marital status:      Spouse name: N/A    Number of children: N/A    Years of education: N/A     Occupational History    Not on file.     Social History Main Topics    Smoking status: Never Smoker    Smokeless tobacco: Never Used    Alcohol use No    Drug use: No    Sexual activity: No     Other Topics Concern    Not on file     Social History Narrative    No narrative on file     Family History   Problem Relation Age of Onset    Diabetes Mother     Heart disease Sister     No Known Problems Brother     No Known Problems Daughter     No Known Problems Son     No Known Problems Brother     No Known Problems Brother     No Known Problems Son     Cancer Neg Hx          Review of Systems  Constitution: Fatigue has improved.   HENT: Denies headaches or blurry vision.  Cardiovascular: Denies chest pain or irregular heart beat.  Respiratory: Negative for shortness of breath.  Gastrointestinal: Denies abdominal pain, nausea, or vomiting.  Musculoskeletal: Denies muscle cramps.  Neurological: Denies dizziness or focal weakness.  Psychiatric/Behavioral: Normal mental  "status.  Hematologic/Lymphatic: Denies bleeding problem or easy bruising/bleeding.  Skin: Denies rash or suspicious lesions    Physical Examination  /62 (BP Location: Left arm)   Pulse 98   Ht 5' 5" (1.651 m)   Wt 83.3 kg (183 lb 10.3 oz)   LMP  (LMP Unknown)   SpO2 (!) 94%   BMI 30.56 kg/m²     Constitutional: No acute distress, conversant  HEENT: Sclera anicteric, Pupils equal, round and reactive to light, extraocular motions intact, Oropharynx clear  Neck: JVD 8-10 cm, no carotid bruits  Cardiovascular: regular rate and rhythm, no murmur, rubs or gallops, normal S1/S2  Pulmonary: Clear to auscultation bilaterally  Abdominal: Abdomen soft, nontender, nondistended, positive bowel sounds  Extremities: No lower extremity edema,   Pulses:  Carotid pulses are 2+ on the right side, and 2+ on the left side.  Radial pulses are 2+ on the right side, and 2+ on the left side.   Femoral pulses are 2+ on the right side, and 2+ on the left side.  Skin: No ecchymosis, erythema, or ulcers  Psych: Alert and oriented x 3, appropriate affect  Neuro: CNII-XII intact, no focal deficits    Labs:  No new labs    Echo 10/19/2017:  CONCLUSIONS     1 - Mildly enlarged ascending aorta.     2 - Biatrial enlargement.     3 - Eccentric hypertrophy.     4 - Severely depressed left ventricular systolic function (EF 10-15%).     5 - Impaired LV relaxation, elevated LAP (grade 2 diastolic dysfunction).     6 - Right ventricular enlargement with mildly depressed systolic function.     7 - Moderate to severe tricuspid regurgitation.     8 - Moderate mitral regurgitation.     9 - Mild aortic regurgitation.     10 - Pulmonary hypertension. The estimated PA systolic pressure is 66 mmHg.     11 - Intermediate central venous pressure.     12 - Significant change from echo on 11/30/2015.      Echo 11/30/2015:  CONCLUSIONS     1 - Mildly to moderately enlarged aortic root.     2 - Mild left atrial enlargement.     3 - Mild left ventricular " enlargement.     4 - Eccentric hypertrophy.     5 - Moderately depressed left ventricular systolic function (EF 35-40%).     6 - Low normal to mildly depressed right ventricular systolic function .     7 - No significant change from Echo on 6/19/2015.     Assessment/Plan:   Eva Corona is a 72 y.o. female with a past medical history of DM2, Afib s/p ablation (on coumadin), Non-ischemic cardiomyopathy (EF 35-40%), HTN, HLD, who presents for follow up.     1. Non-ischemic Cardiomyopathy with SOB- Symptoms of SOB and LE edema now significantly improved and pt remains well  compensated on exam.  Start spironolactone 25 mg daily.  Continue lasix 40 mg in the morning and 40 mg in the evening.  Continue losartan 50 mg daily and toprol xl 50 mg daily.  Continue low salt diet.  Continue 2L supplemental oxygen at night.       2. PAF s/p ablation- Continue anticoagulation with coumadin.      3. HLD- Continue atorvastatin 10 mg daily.    Follow up in 1 month    Total duration of face to face visit time 30 minutes.  Total time spent counseling greater than fifty percent of total visit time.  Counseling included discussion regarding imaging findings, diagnosis, possibilities, treatment options, risks and benefits.      Justus Cleveland MD, PhD  Interventional Cardiology

## 2017-12-28 NOTE — PATIENT INSTRUCTIONS
-check cmp today  -if labs are appropriate, start spironolactone 25 mg daily  -repeat cmp in 1 week, then 1 month  -follow up appointment in 1 month with Dr. London

## 2018-01-03 ENCOUNTER — TELEPHONE (OUTPATIENT)
Dept: CARDIOLOGY | Facility: CLINIC | Age: 73
End: 2018-01-03

## 2018-01-03 NOTE — TELEPHONE ENCOUNTER
Returned pts phone call. Informed her that Dr. Cleveland is now at the [place] location that I would get in touch with him and give her a call back. Pt verbalized understanding. No further issues discussed. Message sent to Dr. Cleveland.

## 2018-01-03 NOTE — TELEPHONE ENCOUNTER
----- Message from Soledad Wells sent at 1/3/2018 11:45 AM CST -----  Contact: self   Patient was prescribed rx spironolactone (ALDACTONE) 25 MG tablet she has been having diarrhea since taking it. She wants to know if she should continue. Please call back at [phone] (home)

## 2018-01-03 NOTE — TELEPHONE ENCOUNTER
Called pt per Dr. Cleveland to let her know that he would like her to stay on the Spironolactone. Pt said she can't leave the house if she takes it because she has diarrhea. Informed pt I would speak with Dr. Cleveland about her concerns. Pt verbalized understanding. No further issues discussed.

## 2018-01-04 ENCOUNTER — LAB VISIT (OUTPATIENT)
Dept: LAB | Facility: HOSPITAL | Age: 73
End: 2018-01-04
Attending: INTERNAL MEDICINE
Payer: MEDICARE

## 2018-01-04 DIAGNOSIS — R09.89 CARDIAC LV EJECTION FRACTION 10-20%: ICD-10-CM

## 2018-01-04 LAB
ALBUMIN SERPL BCP-MCNC: 4 G/DL
ALP SERPL-CCNC: 88 U/L
ALT SERPL W/O P-5'-P-CCNC: 22 U/L
ANION GAP SERPL CALC-SCNC: 9 MMOL/L
AST SERPL-CCNC: 21 U/L
BILIRUB SERPL-MCNC: 0.4 MG/DL
BUN SERPL-MCNC: 18 MG/DL
CALCIUM SERPL-MCNC: 9.8 MG/DL
CHLORIDE SERPL-SCNC: 99 MMOL/L
CO2 SERPL-SCNC: 33 MMOL/L
CREAT SERPL-MCNC: 0.8 MG/DL
EST. GFR  (AFRICAN AMERICAN): >60 ML/MIN/1.73 M^2
EST. GFR  (NON AFRICAN AMERICAN): >60 ML/MIN/1.73 M^2
GLUCOSE SERPL-MCNC: 118 MG/DL
POTASSIUM SERPL-SCNC: 4.5 MMOL/L
PROT SERPL-MCNC: 7.7 G/DL
SODIUM SERPL-SCNC: 141 MMOL/L

## 2018-01-04 PROCEDURE — 36415 COLL VENOUS BLD VENIPUNCTURE: CPT

## 2018-01-04 PROCEDURE — 80053 COMPREHEN METABOLIC PANEL: CPT

## 2018-01-09 ENCOUNTER — ANTI-COAG VISIT (OUTPATIENT)
Dept: CARDIOLOGY | Facility: CLINIC | Age: 73
End: 2018-01-09
Payer: MEDICARE

## 2018-01-09 DIAGNOSIS — Z79.01 LONG TERM CURRENT USE OF ANTICOAGULANT THERAPY: Primary | ICD-10-CM

## 2018-01-09 LAB — INR PPP: 2.6 (ref 2–3)

## 2018-01-09 PROCEDURE — 85610 PROTHROMBIN TIME: CPT | Mod: QW,S$GLB,, | Performed by: PHARMACIST

## 2018-01-09 PROCEDURE — 99211 OFF/OP EST MAY X REQ PHY/QHP: CPT | Mod: 25,S$GLB,, | Performed by: PHARMACIST

## 2018-01-09 NOTE — PROGRESS NOTES
Pt has started taking the aldactone since her angiogram.  No other changes.  Maintain and recheck in 4 weeks

## 2018-01-10 NOTE — PROGRESS NOTES
Patient, Eva Corona (MRN #[id number]), presented with a recent Estimated PA Systolic Pressure greater than 40 mmHG consistent with the definition of pulmonary hypertension (ICD10 - I27.0).    Est. PA Systolic Pressure   Date Value Ref Range Status   10/19/2017 66.37 (A)       The patient's pulmonary hypertension was monitored, evaluated, addressed and/or treated. This addendum to the medical record is made on 01/10/2018.

## 2018-01-24 RX ORDER — LOSARTAN POTASSIUM 50 MG/1
TABLET ORAL
Qty: 90 TABLET | Refills: 0 | Status: SHIPPED | OUTPATIENT
Start: 2018-01-24 | End: 2018-02-27 | Stop reason: ALTCHOICE

## 2018-01-25 ENCOUNTER — APPOINTMENT (OUTPATIENT)
Dept: LAB | Facility: HOSPITAL | Age: 73
End: 2018-01-25
Attending: INTERNAL MEDICINE
Payer: MEDICARE

## 2018-01-25 PROCEDURE — 36415 COLL VENOUS BLD VENIPUNCTURE: CPT

## 2018-01-25 PROCEDURE — 80053 COMPREHEN METABOLIC PANEL: CPT

## 2018-01-30 ENCOUNTER — OFFICE VISIT (OUTPATIENT)
Dept: CARDIOLOGY | Facility: CLINIC | Age: 73
End: 2018-01-30
Payer: MEDICARE

## 2018-01-30 VITALS
DIASTOLIC BLOOD PRESSURE: 63 MMHG | HEIGHT: 65 IN | WEIGHT: 185.63 LBS | SYSTOLIC BLOOD PRESSURE: 138 MMHG | OXYGEN SATURATION: 94 % | BODY MASS INDEX: 30.93 KG/M2 | HEART RATE: 97 BPM

## 2018-01-30 DIAGNOSIS — E11.59 HYPERTENSION ASSOCIATED WITH DIABETES: ICD-10-CM

## 2018-01-30 DIAGNOSIS — I15.2 HYPERTENSION ASSOCIATED WITH DIABETES: ICD-10-CM

## 2018-01-30 DIAGNOSIS — I42.8 NONISCHEMIC CARDIOMYOPATHY: Primary | ICD-10-CM

## 2018-01-30 DIAGNOSIS — I27.20 PULMONARY HYPERTENSION: ICD-10-CM

## 2018-01-30 PROCEDURE — 99499 UNLISTED E&M SERVICE: CPT | Mod: S$GLB,,, | Performed by: INTERNAL MEDICINE

## 2018-01-30 PROCEDURE — 1159F MED LIST DOCD IN RCRD: CPT | Mod: S$GLB,,, | Performed by: INTERNAL MEDICINE

## 2018-01-30 PROCEDURE — 99999 PR PBB SHADOW E&M-EST. PATIENT-LVL III: CPT | Mod: PBBFAC,,, | Performed by: INTERNAL MEDICINE

## 2018-01-30 PROCEDURE — 3008F BODY MASS INDEX DOCD: CPT | Mod: S$GLB,,, | Performed by: INTERNAL MEDICINE

## 2018-01-30 PROCEDURE — 99214 OFFICE O/P EST MOD 30 MIN: CPT | Mod: S$GLB,,, | Performed by: INTERNAL MEDICINE

## 2018-01-30 PROCEDURE — 1126F AMNT PAIN NOTED NONE PRSNT: CPT | Mod: S$GLB,,, | Performed by: INTERNAL MEDICINE

## 2018-01-30 RX ORDER — AMLODIPINE BESYLATE 5 MG/1
TABLET ORAL
COMMUNITY
Start: 2018-01-26 | End: 2018-01-30 | Stop reason: ALTCHOICE

## 2018-01-30 RX ORDER — CARVEDILOL 3.12 MG/1
3.12 TABLET ORAL 2 TIMES DAILY WITH MEALS
Qty: 90 TABLET | Refills: 11 | Status: SHIPPED | OUTPATIENT
Start: 2018-01-30 | End: 2018-03-19 | Stop reason: ALTCHOICE

## 2018-01-30 NOTE — PROGRESS NOTES
[facility]    CC:  cardiomyopathy  Chief Complaint   Patient presents with    Follow-up       Patient ID: Eva Corona is a 72 y.o. female with a past medical history of nonischemic cardiomyopathy    HPI  She is a patient of Dr. Cleveland.  She is currently in NYHA3 Stage C HEART failure.  She was started on spironolactone by Dr. Cleveland a few weeks back.  She has tolerated it.    Past Medical History:   Diagnosis Date    Anticoagulant long-term use     Atrial fibrillation     Cataract     CHF (congestive heart failure)     Diabetes mellitus     Encounter for blood transfusion     Hyperlipidemia     Hypertension     Hypothyroidism     On home oxygen therapy     2L, nightly    S/P ablation of atrial fibrillation 7/22/2015    Shortness of breath on exertion     Thyroid disease     Type 2 diabetes mellitus      Past Surgical History:   Procedure Laterality Date    CARDIAC CATHETERIZATION Right     CARDIAC ELECTROPHYSIOLOGY MAPPING AND ABLATION      COLONOSCOPY      EYE SURGERY Bilateral     cataract, implants    HYSTERECTOMY      TVH, ovaries intact, benign     Social History     Social History    Marital status:      Spouse name: N/A    Number of children: N/A    Years of education: N/A     Occupational History    Not on file.     Social History Main Topics    Smoking status: Never Smoker    Smokeless tobacco: Never Used    Alcohol use No    Drug use: No    Sexual activity: No     Other Topics Concern    Not on file     Social History Narrative    No narrative on file     Family History   Problem Relation Age of Onset    Diabetes Mother     Heart disease Sister     No Known Problems Brother     No Known Problems Daughter     No Known Problems Son     No Known Problems Brother     No Known Problems Brother     No Known Problems Son     Cancer Neg Hx        Review of patient's allergies indicates:   Allergen Reactions    Codeine Nausea And Vomiting     Neuromuscular blockers, steroidal      Other reaction(s): Unknown    Morphine Nausea And Vomiting       Medication List with Changes/Refills   New Medications    CARVEDILOL (COREG) 3.125 MG TABLET    Take 1 tablet (3.125 mg total) by mouth 2 (two) times daily with meals.   Current Medications    ACETAMINOPHEN (TYLENOL) 325 MG TABLET    Take 2 tablets (650 mg total) by mouth every 6 (six) hours as needed for Pain.    ASCORBIC ACID (VITAMIN C) 1000 MG TABLET    Take 1,000 mg by mouth once daily.    ATORVASTATIN (LIPITOR) 10 MG TABLET    TAKE 1 TABLET (10 MG TOTAL) BY MOUTH ONCE DAILY.    COCONUT OIL ORAL    Take by mouth.    FLUTICASONE (FLONASE) 50 MCG/ACTUATION NASAL SPRAY    USE 2 SPRAYS IN EACH NOSTRIL EVERY EVENING    FUROSEMIDE (LASIX) 40 MG TABLET    Take 80 mg in the morning and 40 mg in the evening.    GABAPENTIN (NEURONTIN) 300 MG CAPSULE    Take 300 mg by mouth every evening. Take 3 capsules every evening    GLIMEPIRIDE (AMARYL) 4 MG TABLET    TAKE 2 TABLETS EVERY DAY WITH BREAKFAST    LANSOPRAZOLE (PREVACID SOLUTAB) 30 MG DISINTEGRATING TABLET    Take 30 mg by mouth once daily.    LEVOTHYROXINE (SYNTHROID) 75 MCG TABLET    TAKE 1 TABLET EVERY DAY  BEFORE  BREAKFAST    LOSARTAN (COZAAR) 50 MG TABLET    TAKE ONE TABLET BY MOUTH EVERY DAY    METFORMIN (GLUCOPHAGE) 500 MG TABLET    Take 500 mg by mouth. Take one tablet on Sun, Tues, Thurs, and Saturday.  Take 1 1/2 tablets  on  Monday, Wed, and Friday     MULTIVIT-MIN/FA/CA CARB/VIT K (ONE-A-DAY WOMEN'S 50+ ORAL)    Take 1 tablet by mouth once daily.     POTASSIUM CHLORIDE SA (K-DUR,KLOR-CON) 20 MEQ TABLET    Take 1 tablet (20 mEq total) by mouth once daily.    SPIRONOLACTONE (ALDACTONE) 25 MG TABLET    Take 1 tablet (25 mg total) by mouth once daily.    VITAMIN A ORAL    Take 1 tablet by mouth once daily.     WARFARIN (COUMADIN) 5 MG TABLET    TAKE 1 AND 1/2 TABLETS (7.5 MG)  ON MON, WED, FRI. AND TAKE 1 TABLET (5 MG) ALL OTHER DAYS.   Discontinued  "Medications    AMLODIPINE (NORVASC) 5 MG TABLET           Review of Systems  Constitution: Denies chills, fever, and sweats.  HENT: Denies headaches or blurry vision.  Cardiovascular: Denies chest pain or irregular heart beat.  Respiratory: Denies cough or shortness of breath.  Gastrointestinal: Denies abdominal pain, nausea, or vomiting.  Musculoskeletal: Denies muscle cramps.  Neurological: Denies dizziness or focal weakness.  Psychiatric/Behavioral: Normal mental status.  Hematologic/Lymphatic: Denies bleeding problem or easy bruising/bleeding.  Skin: Denies rash or suspicious lesions    Physical Examination  /63 (BP Location: Left arm)   Pulse 97   Ht 5' 5" (1.651 m)   Wt 84.2 kg (185 lb 10 oz)   LMP  (LMP Unknown)   SpO2 (!) 94%   BMI 30.89 kg/m²     Constitutional: No acute distress, conversant  HEENT: Sclera anicteric, Pupils equal, round and reactive to light, extraocular motions intact, Oropharynx clear  Neck: No JVD, no carotid bruits  Cardiovascular: regular rate and rhythm, no murmur, rubs or gallops, normal S1/S2  Pulmonary: Clear to auscultation bilaterally  Abdominal: Abdomen soft, nontender, nondistended, positive bowel sounds  Extremities: No lower extremity edema,   Pulses:  Carotid pulses are 2+ on the right side, and 2+ on the left side.  Radial pulses are 2+ on the right side, and 2+ on the left side.      Skin: No ecchymosis, erythema, or ulcers  Psych: Alert and oriented x 3, appropriate affect  Neuro: CNII-XII intact, no focal deficits    Labs:  Most Recent Data  CBC:   Lab Results   Component Value Date    WBC 9.00 11/14/2017    HGB 12.4 11/14/2017    HCT 38.0 11/14/2017     11/14/2017    MCV 83 11/14/2017    RDW 16.8 (H) 11/14/2017     BMP:   Lab Results   Component Value Date     01/25/2018    K 4.3 01/25/2018    CL 99 01/25/2018    CO2 29 01/25/2018    BUN 24 (H) 01/25/2018    CREATININE 0.8 01/25/2018     (H) 01/25/2018    CALCIUM 9.9 01/25/2018    MG 1.7 " 11/14/2017    PHOS 3.7 03/31/2015     LFTS;   Lab Results   Component Value Date    PROT 7.6 01/25/2018    ALBUMIN 4.1 01/25/2018    BILITOT 0.5 01/25/2018    AST 20 01/25/2018    ALKPHOS 82 01/25/2018    ALT 21 01/25/2018     COAGS:   Lab Results   Component Value Date    INR 2.6 01/09/2018     FLP:   Lab Results   Component Value Date    CHOL 112 (L) 12/02/2016    HDL 26 (L) 12/02/2016    LDLCALC 57.4 (L) 12/02/2016    TRIG 143 12/02/2016    CHOLHDL 23.2 12/02/2016     CARDIAC:   Lab Results   Component Value Date    TROPONINI 0.010 10/20/2017     (H) 11/14/2017       Imaging:    EKG: Sinus rhythm with short ID  LVH with QRS widening and repolarization abnormality  Abnormal ECG  When compared with ECG of 01-AUG-2017 13:55,  No significant change was found    Echo:  CONCLUSIONS     1 - Mildly enlarged ascending aorta.     2 - Biatrial enlargement.     3 - Eccentric hypertrophy.     4 - Severely depressed left ventricular systolic function (EF 10-15%).     5 - Impaired LV relaxation, elevated LAP (grade 2 diastolic dysfunction).     6 - Right ventricular enlargement with mildly depressed systolic function.     7 - Moderate to severe tricuspid regurgitation.     8 - Moderate mitral regurgitation.     9 - Mild aortic regurgitation.     10 - Pulmonary hypertension. The estimated PA systolic pressure is 66 mmHg.     11 - Intermediate central venous pressure.     12 - Significant change from echo on 11/30/2015.     -Left and right heart cath on shows elevated right heart pressures with non-obstructive CAD (RA 16; RV 60/11; PA 66/24; PCWP 23; CO 4.7; CI 2.5).  EF approximately 25-30% by LV gram. Findings consistent with previously diagnosed non-ischemic cardiomyopathy.      I have personally reviewed these images and echo data    Assessment/Plan:  Eva was seen today for follow-up.    Diagnoses and all orders for this visit:    Nonischemic cardiomyopathy    Hypertension associated with diabetes    Pulmonary  hypertension    Other orders  -     carvedilol (COREG) 3.125 MG tablet; Take 1 tablet (3.125 mg total) by mouth 2 (two) times daily with meals.       we need to optimize heart failure therapy.  She would start on Coreg for a week.  If she tolerates that she will double up on the dose from 3.125 mg twice a day to 6.25 mg twice a day.  I will see her back and increase her losartan.  We will treat her at maximum tolerated medical therapy for at least a few months.  If her ejection fraction does not improve we will discuss an AICD at a later date.  Patient is still not compliant with salt restriction.  As stated by her her diet is usually very high in sodium.  Recommendations regarding healthy lifestyle, sodium restriction, daily weights and exercise given to patient      Follow-up in about 3 weeks (around 2/20/2018).        Total duration of face to face visit time 30 minutes.  Total time spent counseling greater than fifty percent of total visit time.  Counseling included discussion regarding imaging findings, diagnosis, possibilities, treatment options, risks and benefits.  The patient had many questions regarding the options and long-term effect which were all answered to my best ability.      Josef London MD,MRCP,RPVI,FACC,FSCAI.  Interventional Cardiology   Phone [phone]

## 2018-01-30 NOTE — PATIENT INSTRUCTIONS
Coreg 3.125 mg twice a day for 1 week. If able to tolerate increase to 6.25 mg bid.   I will see in 3 week.

## 2018-02-06 ENCOUNTER — ANTI-COAG VISIT (OUTPATIENT)
Dept: CARDIOLOGY | Facility: CLINIC | Age: 73
End: 2018-02-06
Payer: MEDICARE

## 2018-02-06 DIAGNOSIS — Z79.01 LONG TERM CURRENT USE OF ANTICOAGULANT THERAPY: Primary | ICD-10-CM

## 2018-02-06 LAB — INR PPP: 3 (ref 2–3)

## 2018-02-06 PROCEDURE — 99211 OFF/OP EST MAY X REQ PHY/QHP: CPT | Mod: S$PBB,25,, | Performed by: PHARMACIST

## 2018-02-06 PROCEDURE — 85610 PROTHROMBIN TIME: CPT | Mod: PBBFAC,PO | Performed by: PHARMACIST

## 2018-02-06 NOTE — PROGRESS NOTES
Pt seen by Dr London last week.  Lasix decreased to once daily from BID, and started on aldactone daily.  Coreg started at 3.25mg last week and increased today to 6.5mg  No other changes. Maintain and recheck in 3 weeks

## 2018-02-15 ENCOUNTER — TELEPHONE (OUTPATIENT)
Dept: CARDIOLOGY | Facility: CLINIC | Age: 73
End: 2018-02-15

## 2018-02-15 NOTE — TELEPHONE ENCOUNTER
Returned pts phone call. Pt stated she is worse than she was when she came in the office. She stated she is having trouble breathing. I instructed her to go to the ER to get checked out. She denied any other symptoms only stated she is SOB. Pt verbalized understanding and wanted me to let Dr. London know. I informed her Dr. London is out of the office until Monday but I would let him know. No further issues discussed.

## 2018-02-15 NOTE — TELEPHONE ENCOUNTER
----- Message from Juliocesar Gamble sent at 2/15/2018  1:06 PM CST -----  Contact: Eva  States she is getting worse than she was 2 weeks ago. Please call her [phone] (home)   Thanks

## 2018-02-26 ENCOUNTER — TELEPHONE (OUTPATIENT)
Dept: FAMILY MEDICINE | Facility: CLINIC | Age: 73
End: 2018-02-26

## 2018-02-26 RX ORDER — METFORMIN HYDROCHLORIDE 500 MG/1
TABLET ORAL
Qty: 360 TABLET | Refills: 0 | Status: SHIPPED | OUTPATIENT
Start: 2018-02-26 | End: 2018-02-27

## 2018-02-27 ENCOUNTER — OFFICE VISIT (OUTPATIENT)
Dept: CARDIOLOGY | Facility: CLINIC | Age: 73
End: 2018-02-27
Payer: MEDICARE

## 2018-02-27 ENCOUNTER — TELEPHONE (OUTPATIENT)
Dept: CARDIOLOGY | Facility: CLINIC | Age: 73
End: 2018-02-27

## 2018-02-27 ENCOUNTER — ANTI-COAG VISIT (OUTPATIENT)
Dept: CARDIOLOGY | Facility: CLINIC | Age: 73
End: 2018-02-27
Payer: MEDICARE

## 2018-02-27 VITALS
BODY MASS INDEX: 32.4 KG/M2 | DIASTOLIC BLOOD PRESSURE: 60 MMHG | WEIGHT: 194.44 LBS | SYSTOLIC BLOOD PRESSURE: 112 MMHG | HEART RATE: 88 BPM | OXYGEN SATURATION: 92 % | HEIGHT: 65 IN

## 2018-02-27 DIAGNOSIS — I42.9 CARDIOMYOPATHY, UNSPECIFIED TYPE: Primary | ICD-10-CM

## 2018-02-27 DIAGNOSIS — I15.9 SECONDARY HYPERTENSION: ICD-10-CM

## 2018-02-27 DIAGNOSIS — Z79.01 LONG TERM CURRENT USE OF ANTICOAGULANT THERAPY: Primary | ICD-10-CM

## 2018-02-27 LAB — INR PPP: 2.6 (ref 2–3)

## 2018-02-27 PROCEDURE — 99999 PR PBB SHADOW E&M-EST. PATIENT-LVL III: CPT | Mod: PBBFAC,,, | Performed by: INTERNAL MEDICINE

## 2018-02-27 PROCEDURE — 85610 PROTHROMBIN TIME: CPT | Mod: QW,S$GLB,, | Performed by: PHARMACIST

## 2018-02-27 PROCEDURE — 93000 ELECTROCARDIOGRAM COMPLETE: CPT | Mod: S$GLB,,, | Performed by: INTERNAL MEDICINE

## 2018-02-27 PROCEDURE — 3074F SYST BP LT 130 MM HG: CPT | Mod: S$GLB,,, | Performed by: INTERNAL MEDICINE

## 2018-02-27 PROCEDURE — 3078F DIAST BP <80 MM HG: CPT | Mod: S$GLB,,, | Performed by: INTERNAL MEDICINE

## 2018-02-27 PROCEDURE — 99215 OFFICE O/P EST HI 40 MIN: CPT | Mod: S$GLB,,, | Performed by: INTERNAL MEDICINE

## 2018-02-27 PROCEDURE — 99499 UNLISTED E&M SERVICE: CPT | Mod: S$GLB,,, | Performed by: INTERNAL MEDICINE

## 2018-02-27 RX ORDER — BUMETANIDE 1 MG/1
1 TABLET ORAL DAILY
Qty: 30 TABLET | Refills: 11 | Status: SHIPPED | OUTPATIENT
Start: 2018-02-27 | End: 2018-03-14 | Stop reason: SDUPTHER

## 2018-02-27 RX ORDER — METOLAZONE 2.5 MG/1
5 TABLET ORAL DAILY
Qty: 10 TABLET | Refills: 0 | Status: SHIPPED | OUTPATIENT
Start: 2018-02-27 | End: 2018-03-19 | Stop reason: ALTCHOICE

## 2018-02-27 NOTE — PROGRESS NOTES
[facility]    CC: Cardio Myopathy  Chief Complaint   Patient presents with    Follow-up       Patient ID: Eva Corona is a 72 y.o. female with a past medical history of non-ischemic cardiomyopathy, diabetes, congestive heart failure    HPI  Patient  was known to Dr. Cleveland in the past.  Her shortness of breath is getting worse.  She is currently in NYHA class IV Stage C heart failure.  I looked at her angiograms which were done by Dr. Cleveland.  Her ejection fraction is around 20-25%.  Her PCWP WAS 23    Past Medical History:   Diagnosis Date    Anticoagulant long-term use     Atrial fibrillation     Cataract     CHF (congestive heart failure)     Diabetes mellitus     Encounter for blood transfusion     Hyperlipidemia     Hypertension     Hypothyroidism     On home oxygen therapy     2L, nightly    S/P ablation of atrial fibrillation 7/22/2015    Shortness of breath on exertion     Thyroid disease     Type 2 diabetes mellitus      Past Surgical History:   Procedure Laterality Date    CARDIAC CATHETERIZATION Right     CARDIAC ELECTROPHYSIOLOGY MAPPING AND ABLATION      COLONOSCOPY      EYE SURGERY Bilateral     cataract, implants    HYSTERECTOMY      TVH, ovaries intact, benign     Social History     Social History    Marital status:      Spouse name: N/A    Number of children: N/A    Years of education: N/A     Occupational History    Not on file.     Social History Main Topics    Smoking status: Never Smoker    Smokeless tobacco: Never Used    Alcohol use No    Drug use: No    Sexual activity: No     Other Topics Concern    Not on file     Social History Narrative    No narrative on file     Family History   Problem Relation Age of Onset    Diabetes Mother     Heart disease Sister     No Known Problems Brother     No Known Problems Daughter     No Known Problems Son     No Known Problems Brother     No Known Problems Brother     No Known Problems Son      Cancer Neg Hx        Review of patient's allergies indicates:   Allergen Reactions    Codeine Nausea And Vomiting    Neuromuscular blockers, steroidal      Other reaction(s): Unknown    Morphine Nausea And Vomiting       Medication List with Changes/Refills   New Medications    BUMETANIDE (BUMEX) 1 MG TABLET    Take 1 tablet (1 mg total) by mouth once daily.    METOLAZONE (ZAROXOLYN) 2.5 MG TABLET    Take 2 tablets (5 mg total) by mouth once daily.    SACUBITRIL-VALSARTAN (ENTRESTO) 49-51 MG PER TABLET    Take 1 tablet by mouth 2 (two) times daily.   Current Medications    ACETAMINOPHEN (TYLENOL) 325 MG TABLET    Take 2 tablets (650 mg total) by mouth every 6 (six) hours as needed for Pain.    ASCORBIC ACID (VITAMIN C) 1000 MG TABLET    Take 1,000 mg by mouth once daily.    ATORVASTATIN (LIPITOR) 10 MG TABLET    TAKE 1 TABLET (10 MG TOTAL) BY MOUTH ONCE DAILY.    CARVEDILOL (COREG) 3.125 MG TABLET    Take 1 tablet (3.125 mg total) by mouth 2 (two) times daily with meals.    COCONUT OIL ORAL    Take by mouth.    FLUTICASONE (FLONASE) 50 MCG/ACTUATION NASAL SPRAY    USE 2 SPRAYS IN EACH NOSTRIL EVERY EVENING    GABAPENTIN (NEURONTIN) 300 MG CAPSULE    Take 300 mg by mouth every evening. Take 3 capsules every evening    GLIMEPIRIDE (AMARYL) 4 MG TABLET    TAKE 2 TABLETS EVERY DAY WITH BREAKFAST    LANSOPRAZOLE (PREVACID SOLUTAB) 30 MG DISINTEGRATING TABLET    Take 30 mg by mouth once daily.    LEVOTHYROXINE (SYNTHROID) 75 MCG TABLET    TAKE 1 TABLET EVERY DAY  BEFORE  BREAKFAST    METFORMIN (GLUCOPHAGE) 500 MG TABLET    Take 500 mg by mouth. Take one tablet on Sun, Tues, Thurs, and Saturday.  Take 1 1/2 tablets  on  Monday, Wed, and Friday     MULTIVIT-MIN/FA/CA CARB/VIT K (ONE-A-DAY WOMEN'S 50+ ORAL)    Take 1 tablet by mouth once daily.     POTASSIUM CHLORIDE SA (K-DUR,KLOR-CON) 20 MEQ TABLET    Take 1 tablet (20 mEq total) by mouth once daily.    SPIRONOLACTONE (ALDACTONE) 25 MG TABLET    Take 1 tablet (25 mg  "total) by mouth once daily.    VITAMIN A ORAL    Take 1 tablet by mouth once daily.     WARFARIN (COUMADIN) 5 MG TABLET    TAKE 1 AND 1/2 TABLETS (7.5 MG)  ON MON, WED, FRI. AND TAKE 1 TABLET (5 MG) ALL OTHER DAYS.   Discontinued Medications    FUROSEMIDE (LASIX) 40 MG TABLET    Take 80 mg in the morning and 40 mg in the evening.    LOSARTAN (COZAAR) 50 MG TABLET    TAKE ONE TABLET BY MOUTH EVERY DAY    METFORMIN (GLUCOPHAGE) 500 MG TABLET    TAKE TWO TABLETS BY MOUTH TWO TIMES A DAY WITH MEALS       Review of Systems  Constitution: Denies chills, fever, and sweats.  HENT: Denies headaches or blurry vision.  Cardiovascular: Denies chest pain or irregular heart beat.  Respiratory: Denies cough or shortness of breath.  Gastrointestinal: Denies abdominal pain, nausea, or vomiting.  Musculoskeletal: Denies muscle cramps.  Neurological: Denies dizziness or focal weakness.  Psychiatric/Behavioral: Normal mental status.  Hematologic/Lymphatic: Denies bleeding problem or easy bruising/bleeding.  Skin: Denies rash or suspicious lesions    Physical Examination  /60 (BP Location: Left arm)   Pulse 88   Ht 5' 5" (1.651 m)   Wt 88.2 kg (194 lb 7.1 oz)   LMP  (LMP Unknown)   SpO2 (!) 92%   BMI 32.36 kg/m²     Constitutional: No acute distress, conversant  HEENT: Sclera anicteric, Pupils equal, round and reactive to light, extraocular motions intact, Oropharynx clear  Neck: No JVD, no carotid bruits  Cardiovascular: regular rate and rhythm, no murmur, rubs or gallops, normal S1/S2  Pulmonary: Clear to auscultation bilaterally  Abdominal: Abdomen soft, nontender, nondistended, positive bowel sounds  Extremities: 2+ extremity edema,   Pulses:  Carotid pulses are 2+ on the right side, and 2+ on the left side.  Radial pulses are 2+ on the right side, and 2+ on the left side.      Skin: No ecchymosis, erythema, or ulcers  Psych: Alert and oriented x 3, appropriate affect  Neuro: CNII-XII intact, no focal " deficits    Labs:  Most Recent Data  CBC:   Lab Results   Component Value Date    WBC 9.00 11/14/2017    HGB 12.4 11/14/2017    HCT 38.0 11/14/2017     11/14/2017    MCV 83 11/14/2017    RDW 16.8 (H) 11/14/2017     BMP:   Lab Results   Component Value Date     01/25/2018    K 4.3 01/25/2018    CL 99 01/25/2018    CO2 29 01/25/2018    BUN 24 (H) 01/25/2018    CREATININE 0.8 01/25/2018     (H) 01/25/2018    CALCIUM 9.9 01/25/2018    MG 1.7 11/14/2017    PHOS 3.7 03/31/2015     LFTS;   Lab Results   Component Value Date    PROT 7.6 01/25/2018    ALBUMIN 4.1 01/25/2018    BILITOT 0.5 01/25/2018    AST 20 01/25/2018    ALKPHOS 82 01/25/2018    ALT 21 01/25/2018     COAGS:   Lab Results   Component Value Date    INR 2.6 02/27/2018     FLP:   Lab Results   Component Value Date    CHOL 112 (L) 12/02/2016    HDL 26 (L) 12/02/2016    LDLCALC 57.4 (L) 12/02/2016    TRIG 143 12/02/2016    CHOLHDL 23.2 12/02/2016     CARDIAC:   Lab Results   Component Value Date    TROPONINI 0.010 10/20/2017     (H) 11/14/2017       Imaging:    EKG:     Echo:      I have personally reviewed these images and echo data    Assessment/Plan:  Eva was seen today for follow-up.    Diagnoses and all orders for this visit:    Cardiomyopathy, unspecified type  -     Comprehensive metabolic panel; Future    Other orders  -     bumetanide (BUMEX) 1 MG tablet; Take 1 tablet (1 mg total) by mouth once daily.  -     metOLazone (ZAROXOLYN) 2.5 MG tablet; Take 2 tablets (5 mg total) by mouth once daily.  -     sacubitril-valsartan (ENTRESTO) 49-51 mg per tablet; Take 1 tablet by mouth 2 (two) times daily.      I made multiple changes to her regimen.  She probably has portal gastropathy and hence is not absorbing the Lasix.  I want to see her on Monday with new set of labs  I have asked her to come to the emergency room if her shortness of breath does not improve despite these changes as she may need IV diuretics.    Follow-up in about  1 week (around 3/6/2018).          Total duration of face to face visit time 45 minutes.  Total time spent counseling greater than fifty percent of total visit time.  Counseling included discussion regarding imaging findings, diagnosis, possibilities, treatment options, risks and benefits.  The patient had many questions regarding the options and long-term effect which were all answered to my best ability.      Josef London MD,MRCP,RPVI,FACC,FSCAI.  Interventional Cardiology   Phone [phone]

## 2018-02-27 NOTE — TELEPHONE ENCOUNTER
----- Message from Alireza Hastings sent at 2/27/2018  4:19 PM CST -----  Contact: pt  Pt is calling to speak with doctor about her new prescriptions that she recvd    Call Back#[phone]  Thanks

## 2018-02-27 NOTE — PATIENT INSTRUCTIONS
1.stop lasix.  2.Start bumex 1 mg twice a day  3. Start metolazone 5 mg once a day for 5 days.  4. Double up on potassium to 40 meq once a day  4.stop losartan  5. Start entresto after 3 days of stopping losartan.  6. Lab test on Monday before seeing me.

## 2018-02-27 NOTE — PROGRESS NOTES
INR in range.  Pt denies any changes, will see Dr London today and call with changes.  Maintain and recheck in a month

## 2018-02-27 NOTE — TELEPHONE ENCOUNTER
Returned pts call. Pt advised that the pharmacy requires a prior authorization on the Entreso. Advised pt that the pharmacy would have to send a request for that to be done.   Understanding verbalized. No further issues discussed.

## 2018-03-02 ENCOUNTER — TELEPHONE (OUTPATIENT)
Dept: CARDIOLOGY | Facility: CLINIC | Age: 73
End: 2018-03-02

## 2018-03-02 ENCOUNTER — PATIENT MESSAGE (OUTPATIENT)
Dept: CARDIOLOGY | Facility: CLINIC | Age: 73
End: 2018-03-02

## 2018-03-02 NOTE — TELEPHONE ENCOUNTER
Called and spoke with Ms. Corona, she stated that the pharmacy is waiting for the approval for her entresto. I advise we have not received anything from the pharmacy. I advised her I would call her pharmacy and find out what was going on. She verbalized understanding. No further issues noted.     Called pt's drug store, advised them that we have not received anything on on Ms. Corona for her medication. They stated they would fax it over again. No further issues noted.

## 2018-03-02 NOTE — TELEPHONE ENCOUNTER
----- Message from Zarina Figueroa sent at 3/2/2018 11:17 AM CST -----  Eva, patient [phone], Calling about her new heart medication. Stated the pharmacy has not gotten an approval yet. Please advise.Thanks.

## 2018-03-05 ENCOUNTER — PATIENT MESSAGE (OUTPATIENT)
Dept: CARDIOLOGY | Facility: CLINIC | Age: 73
End: 2018-03-05

## 2018-03-05 ENCOUNTER — LAB VISIT (OUTPATIENT)
Dept: LAB | Facility: HOSPITAL | Age: 73
End: 2018-03-05
Attending: INTERNAL MEDICINE
Payer: MEDICARE

## 2018-03-05 DIAGNOSIS — I42.9 CARDIOMYOPATHY, UNSPECIFIED TYPE: ICD-10-CM

## 2018-03-05 LAB
ALBUMIN SERPL BCP-MCNC: 3.8 G/DL
ALP SERPL-CCNC: 86 U/L
ALT SERPL W/O P-5'-P-CCNC: 17 U/L
ANION GAP SERPL CALC-SCNC: 10 MMOL/L
AST SERPL-CCNC: 17 U/L
BILIRUB SERPL-MCNC: 0.7 MG/DL
BUN SERPL-MCNC: 32 MG/DL
CALCIUM SERPL-MCNC: 9.9 MG/DL
CHLORIDE SERPL-SCNC: 88 MMOL/L
CO2 SERPL-SCNC: 39 MMOL/L
CREAT SERPL-MCNC: 1.2 MG/DL
EST. GFR  (AFRICAN AMERICAN): 52 ML/MIN/1.73 M^2
EST. GFR  (NON AFRICAN AMERICAN): 45 ML/MIN/1.73 M^2
GLUCOSE SERPL-MCNC: 314 MG/DL
POTASSIUM SERPL-SCNC: 4 MMOL/L
PROT SERPL-MCNC: 7.2 G/DL
SODIUM SERPL-SCNC: 137 MMOL/L

## 2018-03-05 PROCEDURE — 36415 COLL VENOUS BLD VENIPUNCTURE: CPT

## 2018-03-05 PROCEDURE — 80053 COMPREHEN METABOLIC PANEL: CPT

## 2018-03-05 NOTE — TELEPHONE ENCOUNTER
Called and spoke with Ms. Corona, advised her that Dr. London still wants her to have her blood work done today, but he recommends being on the entresto for 1 week prior to following up with him. She verbalized understanding. I also advised her that the reason it is taking so long to get her medication approved is because her pharmacy notified us on Friday that it needed a Pa. I advised I am working on it now to get it approved as fast as I can. She verbalized understanding. No further issues noted.

## 2018-03-06 ENCOUNTER — TELEPHONE (OUTPATIENT)
Dept: CARDIOLOGY | Facility: CLINIC | Age: 73
End: 2018-03-06

## 2018-03-06 NOTE — TELEPHONE ENCOUNTER
----- Message from Santiago Cruz sent at 3/6/2018  1:32 PM CST -----  Contact: Terri/Oleksandr Murray called in regarding the attached patient.  Terri wanted to know if patient had a diagnoses of NYHA 2, 3 or 4?  Terri also stated she faxed over a clinical questions and wanted to make sure that was received?  Terri's call back number is [phone], reference number is [id number]

## 2018-03-06 NOTE — TELEPHONE ENCOUNTER
[facility] I advised her Dr. Cleveland office visit, Ms. Corona has NYHA class 3. She stated that she will get that info to the medical reviewers and we should know something by tomorrow 3/7/2018 afternoon. No further issues noted.

## 2018-03-07 ENCOUNTER — TELEPHONE (OUTPATIENT)
Dept: CARDIOLOGY | Facility: CLINIC | Age: 73
End: 2018-03-07

## 2018-03-07 NOTE — TELEPHONE ENCOUNTER
Called and spoke with Ms. Corona, informed her that I am waiting on an answer from her insurance. We did an appeal and they should have an answer by this afternoon. She verbalized understanding. No further issues noted.

## 2018-03-07 NOTE — TELEPHONE ENCOUNTER
----- Message from Alireza Hastings sent at 3/7/2018 11:18 AM CST -----  Contact: pt  Pt is calling to discuss a letter she recvd in the mail about a medication that Dr London is wanting to take but her insurance company won't pay for it  Call Back#[phone]  Thanks

## 2018-03-08 ENCOUNTER — TELEPHONE (OUTPATIENT)
Dept: CARDIOLOGY | Facility: CLINIC | Age: 73
End: 2018-03-08

## 2018-03-08 NOTE — TELEPHONE ENCOUNTER
Called and spoke with Ms. Corona, advised her that her entresto was approved. She stated she would start it Monday. I offered her an appt for 3/19/ @ 1pm for her f/u. She accepted. No further issues noted.

## 2018-03-08 NOTE — TELEPHONE ENCOUNTER
----- Message from Ivania Rosas sent at 3/8/2018 10:04 AM CST -----  David Roach with [employer] stated patient's medication: Entresto has been approved/if any questions please call back at [phone].

## 2018-03-15 RX ORDER — BUMETANIDE 1 MG/1
1 TABLET ORAL 2 TIMES DAILY
Qty: 60 TABLET | Refills: 11 | Status: SHIPPED | OUTPATIENT
Start: 2018-03-15 | End: 2018-03-19 | Stop reason: ALTCHOICE

## 2018-03-16 DIAGNOSIS — I10 HYPERTENSION, UNSPECIFIED TYPE: Primary | ICD-10-CM

## 2018-03-16 RX ORDER — LEVOTHYROXINE SODIUM 75 UG/1
TABLET ORAL
Qty: 90 TABLET | Refills: 3 | Status: SHIPPED | OUTPATIENT
Start: 2018-03-16 | End: 2019-01-05 | Stop reason: SDUPTHER

## 2018-03-16 RX ORDER — GLIMEPIRIDE 4 MG/1
TABLET ORAL
Qty: 180 TABLET | Refills: 3 | Status: SHIPPED | OUTPATIENT
Start: 2018-03-16 | End: 2019-01-05 | Stop reason: SDUPTHER

## 2018-03-19 ENCOUNTER — OFFICE VISIT (OUTPATIENT)
Dept: CARDIOLOGY | Facility: CLINIC | Age: 73
End: 2018-03-19
Payer: MEDICARE

## 2018-03-19 VITALS
DIASTOLIC BLOOD PRESSURE: 56 MMHG | OXYGEN SATURATION: 93 % | HEIGHT: 65 IN | SYSTOLIC BLOOD PRESSURE: 105 MMHG | HEART RATE: 94 BPM | RESPIRATION RATE: 16 BRPM | BODY MASS INDEX: 31.22 KG/M2 | WEIGHT: 187.38 LBS

## 2018-03-19 DIAGNOSIS — I48.19 PERSISTENT ATRIAL FIBRILLATION: Primary | ICD-10-CM

## 2018-03-19 DIAGNOSIS — I42.8 OTHER CARDIOMYOPATHY: ICD-10-CM

## 2018-03-19 DIAGNOSIS — I10 HYPERTENSION, UNSPECIFIED TYPE: ICD-10-CM

## 2018-03-19 DIAGNOSIS — I27.20 PULMONARY HYPERTENSION: ICD-10-CM

## 2018-03-19 PROCEDURE — 99999 PR PBB SHADOW E&M-EST. PATIENT-LVL III: CPT | Mod: PBBFAC,,, | Performed by: INTERNAL MEDICINE

## 2018-03-19 PROCEDURE — 3078F DIAST BP <80 MM HG: CPT | Mod: CPTII,S$GLB,, | Performed by: INTERNAL MEDICINE

## 2018-03-19 PROCEDURE — 99499 UNLISTED E&M SERVICE: CPT | Mod: S$GLB,,, | Performed by: INTERNAL MEDICINE

## 2018-03-19 PROCEDURE — 3074F SYST BP LT 130 MM HG: CPT | Mod: CPTII,S$GLB,, | Performed by: INTERNAL MEDICINE

## 2018-03-19 PROCEDURE — 93000 ELECTROCARDIOGRAM COMPLETE: CPT | Mod: S$GLB,,, | Performed by: INTERNAL MEDICINE

## 2018-03-19 PROCEDURE — 99215 OFFICE O/P EST HI 40 MIN: CPT | Mod: S$GLB,,, | Performed by: INTERNAL MEDICINE

## 2018-03-19 RX ORDER — BUMETANIDE 1 MG/1
1 TABLET ORAL DAILY
Qty: 30 TABLET | Refills: 11 | Status: SHIPPED | OUTPATIENT
Start: 2018-03-19 | End: 2019-04-02 | Stop reason: SDUPTHER

## 2018-03-19 RX ORDER — BUMETANIDE 0.5 MG/1
0.5 TABLET ORAL DAILY
Qty: 30 TABLET | Refills: 11 | Status: SHIPPED | OUTPATIENT
Start: 2018-03-19 | End: 2019-03-12 | Stop reason: SDUPTHER

## 2018-03-19 RX ORDER — CARVEDILOL 6.25 MG/1
6.25 TABLET ORAL 2 TIMES DAILY WITH MEALS
Qty: 90 TABLET | Refills: 11 | Status: SHIPPED | OUTPATIENT
Start: 2018-03-19 | End: 2018-08-17 | Stop reason: ALTCHOICE

## 2018-03-19 NOTE — PROGRESS NOTES
[facility]    CC: Cardiomyopathy  Chief Complaint   Patient presents with    Follow-up       Patient ID: Eva Corona is a 72 y.o. female with a past medical history of nonischemic cardiomyopathy, paroxysmal atrial fibrillation,    HPI  SHe is feeling much better with the medication changes.  Looking at her EKGs it looks like she is now back in atrial flutter.  She has had ablations done in the past however her hospital course was complicated with bleeding.  sHe is currently not very keen on a repeat ablation    Past Medical History:   Diagnosis Date    Anticoagulant long-term use     Atrial fibrillation     Cataract     CHF (congestive heart failure)     Diabetes mellitus     Encounter for blood transfusion     Hyperlipidemia     Hypertension     Hypothyroidism     On home oxygen therapy     2L, nightly    S/P ablation of atrial fibrillation 7/22/2015    Shortness of breath on exertion     Thyroid disease     Type 2 diabetes mellitus      Past Surgical History:   Procedure Laterality Date    CARDIAC CATHETERIZATION Right     CARDIAC ELECTROPHYSIOLOGY MAPPING AND ABLATION      COLONOSCOPY      EYE SURGERY Bilateral     cataract, implants    HYSTERECTOMY      TVH, ovaries intact, benign     Social History     Social History    Marital status:      Spouse name: N/A    Number of children: N/A    Years of education: N/A     Occupational History    Not on file.     Social History Main Topics    Smoking status: Never Smoker    Smokeless tobacco: Never Used    Alcohol use No    Drug use: No    Sexual activity: No     Other Topics Concern    Not on file     Social History Narrative    No narrative on file     Family History   Problem Relation Age of Onset    Diabetes Mother     Heart disease Sister     No Known Problems Brother     No Known Problems Daughter     No Known Problems Son     No Known Problems Brother     No Known Problems Brother     No Known  Problems Son     Cancer Neg Hx        Review of patient's allergies indicates:   Allergen Reactions    Codeine Nausea And Vomiting    Neuromuscular blockers, steroidal      Other reaction(s): Unknown    Morphine Nausea And Vomiting       Medication List with Changes/Refills   New Medications    BUMETANIDE (BUMEX) 0.5 MG TAB    Take 1 tablet (0.5 mg total) by mouth once daily.    BUMETANIDE (BUMEX) 1 MG TABLET    Take 1 tablet (1 mg total) by mouth once daily.    CARVEDILOL (COREG) 6.25 MG TABLET    Take 1 tablet (6.25 mg total) by mouth 2 (two) times daily with meals.    SACUBITRIL-VALSARTAN (ENTRESTO)  MG PER TABLET    Take 1 tablet by mouth 2 (two) times daily.   Current Medications    ACETAMINOPHEN (TYLENOL) 325 MG TABLET    Take 2 tablets (650 mg total) by mouth every 6 (six) hours as needed for Pain.    ASCORBIC ACID (VITAMIN C) 1000 MG TABLET    Take 1,000 mg by mouth once daily.    ATORVASTATIN (LIPITOR) 10 MG TABLET    TAKE 1 TABLET (10 MG TOTAL) BY MOUTH ONCE DAILY.    COCONUT OIL ORAL    Take by mouth.    FLUTICASONE (FLONASE) 50 MCG/ACTUATION NASAL SPRAY    USE 2 SPRAYS IN EACH NOSTRIL EVERY EVENING    GLIMEPIRIDE (AMARYL) 4 MG TABLET    TAKE 2 TABLETS EVERY DAY WITH BREAKFAST    LANSOPRAZOLE (PREVACID SOLUTAB) 30 MG DISINTEGRATING TABLET    Take 30 mg by mouth once daily.    LEVOTHYROXINE (SYNTHROID) 75 MCG TABLET    TAKE 1 TABLET EVERY DAY  BEFORE  BREAKFAST    METFORMIN (GLUCOPHAGE) 500 MG TABLET    Take 500 mg by mouth. Take one tablet on Sun, Tues, Thurs, and Saturday.  Take 1 1/2 tablets  on  Monday, Wed, and Friday     MULTIVIT-MIN/FA/CA CARB/VIT K (ONE-A-DAY WOMEN'S 50+ ORAL)    Take 1 tablet by mouth once daily.     POTASSIUM CHLORIDE SA (K-DUR,KLOR-CON) 20 MEQ TABLET    Take 1 tablet (20 mEq total) by mouth once daily.    SPIRONOLACTONE (ALDACTONE) 25 MG TABLET    Take 1 tablet (25 mg total) by mouth once daily.    VITAMIN A ORAL    Take 1 tablet by mouth once daily.     WARFARIN  "(COUMADIN) 5 MG TABLET    TAKE 1 AND 1/2 TABLETS (7.5 MG)  ON MON, WED, FRI. AND TAKE 1 TABLET (5 MG) ALL OTHER DAYS.   Discontinued Medications    BUMETANIDE (BUMEX) 1 MG TABLET    Take 1 tablet (1 mg total) by mouth 2 (two) times daily.    CARVEDILOL (COREG) 3.125 MG TABLET    Take 1 tablet (3.125 mg total) by mouth 2 (two) times daily with meals.    GABAPENTIN (NEURONTIN) 300 MG CAPSULE    Take 300 mg by mouth every evening. Take 3 capsules every evening    METOLAZONE (ZAROXOLYN) 2.5 MG TABLET    Take 2 tablets (5 mg total) by mouth once daily.    SACUBITRIL-VALSARTAN (ENTRESTO) 49-51 MG PER TABLET    Take 1 tablet by mouth 2 (two) times daily.       Review of Systems  Constitution: Denies chills, fever, and sweats.  HENT: Denies headaches or blurry vision.  Cardiovascular: Denies chest pain or irregular heart beat.  Respiratory: Denies cough or shortness of breath.  Gastrointestinal: Denies abdominal pain, nausea, or vomiting.  Musculoskeletal: Denies muscle cramps.  Neurological: Denies dizziness or focal weakness.  Psychiatric/Behavioral: Normal mental status.  Hematologic/Lymphatic: Denies bleeding problem or easy bruising/bleeding.  Skin: Denies rash or suspicious lesions    Physical Examination  BP (!) 105/56 (BP Location: Left arm, Patient Position: Sitting)   Pulse 94   Resp 16   Ht 5' 5" (1.651 m)   Wt 85 kg (187 lb 6.3 oz)   LMP  (LMP Unknown)   SpO2 (!) 93%   BMI 31.18 kg/m²     Constitutional: No acute distress, conversant  HEENT: Sclera anicteric, Pupils equal, round and reactive to light, extraocular motions intact, Oropharynx clear  Neck: No JVD, no carotid bruits  Cardiovascular: irregular rate , no murmur, rubs or gallops, normal S1/S2  Pulmonary: Clear to auscultation bilaterally  Abdominal: Abdomen soft, nontender, nondistended, positive bowel sounds  Extremities: No lower extremity edema,   Pulses:  Carotid pulses are 2+ on the right side, and 2+ on the left side.  Radial pulses are 2+ " on the right side, and 2+ on the left side.      Skin: No ecchymosis, erythema, or ulcers  Psych: Alert and oriented x 3, appropriate affect  Neuro: CNII-XII intact, no focal deficits    Labs:  Most Recent Data  CBC:   Lab Results   Component Value Date    WBC 9.00 11/14/2017    HGB 12.4 11/14/2017    HCT 38.0 11/14/2017     11/14/2017    MCV 83 11/14/2017    RDW 16.8 (H) 11/14/2017     BMP:   Lab Results   Component Value Date     03/05/2018    K 4.0 03/05/2018    CL 88 (L) 03/05/2018    CO2 39 (H) 03/05/2018    BUN 32 (H) 03/05/2018    CREATININE 1.2 03/05/2018     (H) 03/05/2018    CALCIUM 9.9 03/05/2018    MG 1.7 11/14/2017    PHOS 3.7 03/31/2015     LFTS;   Lab Results   Component Value Date    PROT 7.2 03/05/2018    ALBUMIN 3.8 03/05/2018    BILITOT 0.7 03/05/2018    AST 17 03/05/2018    ALKPHOS 86 03/05/2018    ALT 17 03/05/2018     COAGS:   Lab Results   Component Value Date    INR 2.6 02/27/2018     FLP:   Lab Results   Component Value Date    CHOL 112 (L) 12/02/2016    HDL 26 (L) 12/02/2016    LDLCALC 57.4 (L) 12/02/2016    TRIG 143 12/02/2016    CHOLHDL 23.2 12/02/2016     CARDIAC:   Lab Results   Component Value Date    TROPONINI 0.010 10/20/2017     (H) 11/14/2017       Imaging:    EKG: Atrial flutter.    Echo:  CONCLUSIONS     1 - Mildly enlarged ascending aorta.     2 - Biatrial enlargement.     3 - Eccentric hypertrophy.     4 - Severely depressed left ventricular systolic function (EF 10-15%).     5 - Impaired LV relaxation, elevated LAP (grade 2 diastolic dysfunction).     6 - Right ventricular enlargement with mildly depressed systolic function.     7 - Moderate to severe tricuspid regurgitation.     8 - Moderate mitral regurgitation.     9 - Mild aortic regurgitation.     10 - Pulmonary hypertension. The estimated PA systolic pressure is 66 mmHg.     11 - Intermediate central venous pressure.     12 - Significant change from echo on 11/30/2015.       I have personally  reviewed these images and echo data    Assessment/Plan:  Eva was seen today for follow-up.    Diagnoses and all orders for this visit:    Persistent atrial fibrillation  -     Comprehensive metabolic panel; Future    Hypertension, unspecified type  -     EKG 12-lead    Other cardiomyopathy    Pulmonary hypertension    Other orders  -     bumetanide (BUMEX) 0.5 MG Tab; Take 1 tablet (0.5 mg total) by mouth once daily.  -     sacubitril-valsartan (ENTRESTO)  mg per tablet; Take 1 tablet by mouth 2 (two) times daily.  -     bumetanide (BUMEX) 1 MG tablet; Take 1 tablet (1 mg total) by mouth once daily.  -     carvedilol (COREG) 6.25 MG tablet; Take 1 tablet (6.25 mg total) by mouth 2 (two) times daily with meals.        She is not very keen on electrical cardioversion.  I recommended that we should try and get her into rhythm.  We will try to do a chemical cardioversion on her.  Her INR seems to be therapeutic for the last 2 or 3 times it has been checked.  I would check it a couple of times more before attempting the chemical cardioversion.  I have made some changes to her regimen as mentioned above.        Total duration of face to face visit time 30 minutes.  Total time spent counseling greater than fifty percent of total visit time.  Counseling included discussion regarding imaging findings, diagnosis, possibilities, treatment options, risks and benefits.  The patient had many questions regarding the options and long-term effect which were all answered to my best ability.      Josef London MD,MRCP,RPVI,FACC,FSCAI.  Interventional Cardiology   Phone [phone]

## 2018-03-21 DIAGNOSIS — I48.91 ATRIAL FIBRILLATION, UNSPECIFIED TYPE: Primary | ICD-10-CM

## 2018-03-21 RX ORDER — ATORVASTATIN CALCIUM 10 MG/1
TABLET, FILM COATED ORAL
Qty: 90 TABLET | Refills: 3 | Status: SHIPPED | OUTPATIENT
Start: 2018-03-21 | End: 2019-01-05 | Stop reason: SDUPTHER

## 2018-03-21 RX ORDER — FUROSEMIDE 40 MG/1
TABLET ORAL
Qty: 180 TABLET | Refills: 3 | OUTPATIENT
Start: 2018-03-21

## 2018-03-27 ENCOUNTER — LAB VISIT (OUTPATIENT)
Dept: LAB | Facility: HOSPITAL | Age: 73
End: 2018-03-27
Attending: FAMILY MEDICINE
Payer: MEDICARE

## 2018-03-27 DIAGNOSIS — Z79.01 LONG TERM CURRENT USE OF ANTICOAGULANT THERAPY: ICD-10-CM

## 2018-03-27 DIAGNOSIS — I15.9 SECONDARY HYPERTENSION: ICD-10-CM

## 2018-03-27 LAB
INR PPP: 3
PROTHROMBIN TIME: 30.2 SEC

## 2018-03-27 PROCEDURE — 85610 PROTHROMBIN TIME: CPT

## 2018-03-28 ENCOUNTER — PATIENT MESSAGE (OUTPATIENT)
Dept: CARDIOLOGY | Facility: HOSPITAL | Age: 73
End: 2018-03-28

## 2018-04-02 RX ORDER — AMLODIPINE BESYLATE 5 MG/1
TABLET ORAL
Qty: 90 TABLET | Refills: 3 | Status: SHIPPED | OUTPATIENT
Start: 2018-04-02 | End: 2018-06-19

## 2018-04-03 ENCOUNTER — TELEPHONE (OUTPATIENT)
Dept: CARDIOLOGY | Facility: CLINIC | Age: 73
End: 2018-04-03

## 2018-04-03 NOTE — TELEPHONE ENCOUNTER
Call and spoke with Ms. Corona, advised her that the Cardiology dept will call her the day before her procedure to let her know what time she has to be there on April 10th. She verba;ized understanding. No further issues noted.

## 2018-04-03 NOTE — TELEPHONE ENCOUNTER
----- Message from RT Keanu sent at 4/3/2018  2:29 PM CDT -----  Contact: Pt    Pt , request a call back soon concerning her procedure and [facility] is still waiting on your office to respond to their message, thanks.

## 2018-04-10 ENCOUNTER — TELEPHONE (OUTPATIENT)
Dept: CARDIOLOGY | Facility: CLINIC | Age: 73
End: 2018-04-10

## 2018-04-10 ENCOUNTER — HOSPITAL ENCOUNTER (OUTPATIENT)
Facility: HOSPITAL | Age: 73
Discharge: HOME | End: 2018-04-10
Attending: INTERNAL MEDICINE | Admitting: INTERNAL MEDICINE
Payer: MEDICARE

## 2018-04-10 VITALS
DIASTOLIC BLOOD PRESSURE: 56 MMHG | WEIGHT: 183 LBS | OXYGEN SATURATION: 94 % | HEIGHT: 65 IN | BODY MASS INDEX: 30.49 KG/M2 | SYSTOLIC BLOOD PRESSURE: 120 MMHG | TEMPERATURE: 98 F | RESPIRATION RATE: 16 BRPM | HEART RATE: 62 BPM

## 2018-04-10 DIAGNOSIS — I48.92 ATRIAL FLUTTER: ICD-10-CM

## 2018-04-10 DIAGNOSIS — I48.19 PERSISTENT ATRIAL FIBRILLATION: Primary | ICD-10-CM

## 2018-04-10 DIAGNOSIS — I48.91 ATRIAL FIBRILLATION, UNSPECIFIED TYPE: ICD-10-CM

## 2018-04-10 LAB
BASOPHILS # BLD AUTO: 0 K/UL
BASOPHILS NFR BLD: 0.4 %
DIFFERENTIAL METHOD: ABNORMAL
EOSINOPHIL # BLD AUTO: 0.2 K/UL
EOSINOPHIL NFR BLD: 2.9 %
ERYTHROCYTE [DISTWIDTH] IN BLOOD BY AUTOMATED COUNT: 15.2 %
HCT VFR BLD AUTO: 36.6 %
HGB BLD-MCNC: 11.9 G/DL
LYMPHOCYTES # BLD AUTO: 1.7 K/UL
LYMPHOCYTES NFR BLD: 29.8 %
MCH RBC QN AUTO: 27.4 PG
MCHC RBC AUTO-ENTMCNC: 32.5 G/DL
MCV RBC AUTO: 84 FL
MONOCYTES # BLD AUTO: 0.4 K/UL
MONOCYTES NFR BLD: 7.1 %
NEUTROPHILS # BLD AUTO: 3.5 K/UL
NEUTROPHILS NFR BLD: 59.8 %
PLATELET # BLD AUTO: 224 K/UL
PMV BLD AUTO: 8.5 FL
RBC # BLD AUTO: 4.34 M/UL
WBC # BLD AUTO: 5.8 K/UL

## 2018-04-10 PROCEDURE — 36415 COLL VENOUS BLD VENIPUNCTURE: CPT

## 2018-04-10 PROCEDURE — 99900104 DSU ONLY-NO CHARGE-EA ADD'L HR (STAT): Performed by: INTERNAL MEDICINE

## 2018-04-10 PROCEDURE — 63600175 PHARM REV CODE 636 W HCPCS: Performed by: INTERNAL MEDICINE

## 2018-04-10 PROCEDURE — 85025 COMPLETE CBC W/AUTO DIFF WBC: CPT

## 2018-04-10 PROCEDURE — 93010 ELECTROCARDIOGRAM REPORT: CPT | Mod: ,,, | Performed by: INTERNAL MEDICINE

## 2018-04-10 PROCEDURE — 99900103 DSU ONLY-NO CHARGE-INITIAL HR (STAT): Performed by: INTERNAL MEDICINE

## 2018-04-10 PROCEDURE — 92960 CARDIOVERSION ELECTRIC EXT: CPT | Performed by: INTERNAL MEDICINE

## 2018-04-10 PROCEDURE — 99152 MOD SED SAME PHYS/QHP 5/>YRS: CPT | Performed by: INTERNAL MEDICINE

## 2018-04-10 PROCEDURE — 92960 CARDIOVERSION ELECTRIC EXT: CPT | Mod: ,,, | Performed by: INTERNAL MEDICINE

## 2018-04-10 PROCEDURE — 93005 ELECTROCARDIOGRAM TRACING: CPT | Mod: 59

## 2018-04-10 PROCEDURE — 99153 MOD SED SAME PHYS/QHP EA: CPT | Performed by: INTERNAL MEDICINE

## 2018-04-10 RX ORDER — MIDAZOLAM HYDROCHLORIDE 1 MG/ML
INJECTION, SOLUTION INTRAMUSCULAR; INTRAVENOUS
Status: DISCONTINUED | OUTPATIENT
Start: 2018-04-10 | End: 2018-04-10 | Stop reason: HOSPADM

## 2018-04-10 RX ORDER — SODIUM CHLORIDE 9 MG/ML
20 INJECTION, SOLUTION INTRAVENOUS CONTINUOUS
Status: DISCONTINUED | OUTPATIENT
Start: 2018-04-10 | End: 2018-04-10 | Stop reason: HOSPADM

## 2018-04-10 RX ORDER — FENTANYL CITRATE 50 UG/ML
INJECTION, SOLUTION INTRAMUSCULAR; INTRAVENOUS
Status: DISCONTINUED | OUTPATIENT
Start: 2018-04-10 | End: 2018-04-10 | Stop reason: HOSPADM

## 2018-04-10 NOTE — TELEPHONE ENCOUNTER
Called and spoke with Ms. Corona, offered her an appt on April 17th @ 3:15p. She accepted. No further issues noted.

## 2018-04-10 NOTE — TELEPHONE ENCOUNTER
----- Message from Elen Acevedo sent at 4/10/2018  1:20 PM CDT -----  Please call patient in regards to scheduling a 1 week f/u EKG, [phone] (home)

## 2018-04-10 NOTE — DISCHARGE INSTRUCTIONS
Discharge Instructions for Cardioversion  Your doctor performed a procedure called cardioversion. Your doctor used a controlled electric shock or a medication to briefly stop all electrical activity in your heart. This helped restore your hearts normal rhythm. Here are some instructions to follow while you recover.  Home care  · Because cardioversion typically requires sedation, you won't be able to drive home. You will need a ride.  · Dont be alarmed if the skin on your chest is wrinkled or feels like it is sunburned. Your doctor may prescribe a soothing lotion to relieve this discomfort. These minor symptoms will go away in a few days.  · Ask your doctor about medications to keep your heart rhythm steady.  · If you were prescribed medication, take it as instructed by your doctor. Dont skip doses or take double doses. Cardioversion requires blood thinners to prevent a delayed risk of stroke. Be sure you discuss which medication you are taking to prevent stroke. Ask when you need to have your medication levels checked, and whether you may be able to stop taking it in the future or whether it is recommended that you take it for life.  · Learn to take your own pulse. Keep a record of your results. Ask your doctor when you should seek emergency medical attention. He or she will tell you which pulse rate reading is dangerous.   · Keep in mind this procedure may need to be repeated at a later date. After the procedure, your doctor will tell you if the treatment worked or if you will need further treatments or medication.     Follow-up care  Make a follow-up appointment as directed by our staff.     When to seek medical care  Call 911 right away if you have:  · Chest pain.  · Shortness of breath.  Otherwise, call your doctor immediately if you have:  · Fainting  · Chest pain with increased activity  · Irregular heartbeat or fast pulse   © 4150-1792 [employer]. [address] [place] [address], [place] [address].  All rights reserved. This information is not intended as a substitute for professional medical care. Always follow your healthcare professional's instructions.      Electrical Cardioversion    You had a cardioversion today. This is an electrical shock applied to the chest. The shock reset your heart rhythm back to normal. Your chest wall and chest muscles may feel sore for a few days. A bruise may appear on your chest. That will go away within a week.  To get ready for this procedure, you may have been given medicine to help you relax and to reduce pain. Depending on the medicine used, it could take up to 8 hours for the effect to wear off.  Home care  Follow these guidelines when caring for yourself at home:  · You should be watched by a responsible adult for the next 8 hours. This is in case your condition gets worse.  · Dont take any oral medicine for pain or sleep during the next 4 hours. These medicines might react with the medicines you were given in the hospital. This can cause a much stronger response than usual.  · Dont drink any alcohol for the next 24 hours.  · Dont drive or operate dangerous machinery during the next 24 hours.  · Your health care provider may have prescribed medicines to stop the abnormal heart rhythm from coming back. Take these medicines as directed.  · You may use acetaminophen or ibuprofen to control pain, unless another pain medicine was prescribed. If you have chronic liver or kidney disease, talk with your provider before taking these medicines. Also talk with your provider if youve had a stomach ulcer or GI bleeding.  Follow-up care  Follow up with your health care provider, or as advised, if you arent alert and back to your usual level of activity within 12 hours.   When to seek medical advice  Call your health care provider right away if any of these occur:  · Weakness, dizziness, lightheadedness, or fainting  · Pain in your chest, arm, shoulder, neck or upper  back  · Shortness of breath  · Rapid heart rate (more than 120 beats per minute at rest)  · You feel like your heart is fluttering or beating fast, hard, or irregularly (palpitations)  · You have problems speaking or seeing  · Weakness in an arm or leg  · More than minor skin discomfort or redness where the cardioversion pads were placed   © 6465-9196 [employer]. [address] [place] [address]. All rights reserved. This information is not intended as a substitute for professional medical care. Always follow your healthcare professional's instructions.        Procedural Sedation (Adult)  You have been given medicine by vein to sedate you during your procedure. This may have included both a pain medicine and sleeping medicine. Most of the effects have worn off. But you may continue to have some drowsiness for the next 6 to 8 hours.  Home care  Follow these guidelines when you get home:  · For the next 8 hours, you should be watched by a responsible adult to look for any worsening of your condition.  · Do not take any oral medicine for pain or sleep during the next 4 hour, since this might react with the medicines you were given in the hospital causing a much stronger response than usual.  · Do not drink any alcohol for the next 24 hours.  · Do not drive or operate dangerous machinery during the next 24 hours.  Follow-up care  Follow up with your health care provider if you are not alert and back to your usual level of activity within 12 hours.  When to seek medical advice  Call your health care provider right away if any of these occur:  · Increased drowsiness  · Increased weakness or dizziness  · Repeated vomiting  · You cannot be awakened  © 1775-3489 [employer]. [address] [place] [address]. All rights reserved. This information is not intended as a substitute for professional medical care. Always follow your healthcare professional's  "instructions.      Discharge Instructions: After Your Surgery/Procedure  Youve just had surgery. During surgery you were given medicine called anesthesia to keep you relaxed and free of pain. After surgery you may have some pain or nausea. This is common. Here are some tips for feeling better and getting well after surgery.     Stay on schedule with your medication.   Going home  Your doctor or nurse will show you how to take care of yourself when you go home. He or she will also answer your questions. Have an adult family member or friend drive you home.      For your safety we recommend these precaution for the first 24 hours after your procedure:  · Do not drive or use heavy equipment.  · Do not make important decisions or sign legal papers.  · Do not drink alcohol.  · Have someone stay with you, if needed. He or she can watch for problems and help keep you safe.  · Your concentration, balance, coordination, and judgement may be impaired for many hours after anesthesia.  Use caution when ambulating or standing up.     · You may feel weak and "washed out" after anesthesia and surgery.      Subtle residual effects of general anesthesia or sedation with regional / local anesthesia can last more than 24 hours.  Rest for the remainder of the day or longer if your Doctor/Surgeon has advised you to do so.  Although you may feel normal within the first 24 hours, your reflexes and mental ability may be impaired without you realizing it.  You may feel dizzy, lightheaded or sleepy for 24 hours or longer.      Be sure to go to all follow-up visits with your doctor. And rest after your surgery for as long as your doctor tells you to.  Coping with pain  If you have pain after surgery, pain medicine will help you feel better. Take it as told, before pain becomes severe. Also, ask your doctor or pharmacist about other ways to control pain. This might be with heat, ice, or relaxation. And follow any other instructions your " surgeon or nurse gives you.  Tips for taking pain medicine  To get the best relief possible, remember these points:  · Pain medicines can upset your stomach. Taking them with a little food may help.  · Most pain relievers taken by mouth need at least 20 to 30 minutes to start to work.  · Taking medicine on a schedule can help you remember to take it. Try to time your medicine so that you can take it before starting an activity. This might be before you get dressed, go for a walk, or sit down for dinner.  · Constipation is a common side effect of pain medicines. Call your doctor before taking any medicines such as laxatives or stool softeners to help ease constipation. Also ask if you should skip any foods. Drinking lots of fluids and eating foods such as fruits and vegetables that are high in fiber can also help. Remember, do not take laxatives unless your surgeon has prescribed them.  · Drinking alcohol and taking pain medicine can cause dizziness and slow your breathing. It can even be deadly. Do not drink alcohol while taking pain medicine.  · Pain medicine can make you react more slowly to things. Do not drive or run machinery while taking pain medicine.  Your health care provider may tell you to take acetaminophen to help ease your pain. Ask him or her how much you are supposed to take each day. Acetaminophen or other pain relievers may interact with your prescription medicines or other over-the-counter (OTC) drugs. Some prescription medicines have acetaminophen and other ingredients. Using both prescription and OTC acetaminophen for pain can cause you to overdose. Read the labels on your OTC medicines with care. This will help you to clearly know the list of ingredients, how much to take, and any warnings. It may also help you not take too much acetaminophen. If you have questions or do not understand the information, ask your pharmacist or health care provider to explain it to you before you take the OTC  medicine.  Managing nausea  Some people have an upset stomach after surgery. This is often because of anesthesia, pain, or pain medicine, or the stress of surgery. These tips will help you handle nausea and eat healthy foods as you get better. If you were on a special food plan before surgery, ask your doctor if you should follow it while you get better. These tips may help:  · Do not push yourself to eat. Your body will tell you when to eat and how much.  · Start off with clear liquids and soup. They are easier to digest.  · Next try semi-solid foods, such as mashed potatoes, applesauce, and gelatin, as you feel ready.  · Slowly move to solid foods. Dont eat fatty, rich, or spicy foods at first.  · Do not force yourself to have 3 large meals a day. Instead eat smaller amounts more often.  · Take pain medicines with a small amount of solid food, such as crackers or toast, to avoid nausea.     Call your surgeon if  · You still have pain an hour after taking medicine. The medicine may not be strong enough.  · You feel too sleepy, dizzy, or groggy. The medicine may be too strong.  · You have side effects like nausea, vomiting, or skin changes, such as rash, itching, or hives.       If you have obstructive sleep apnea  You were given anesthesia medicine during surgery to keep you comfortable and free of pain. After surgery, you may have more apnea spells because of this medicine and other medicines you were given. The spells may last longer than usual.   At home:  · Keep using the continuous positive airway pressure (CPAP) device when you sleep. Unless your health care provider tells you not to, use it when you sleep, day or night. CPAP is a common device used to treat obstructive sleep apnea.  · Talk with your provider before taking any pain medicine, muscle relaxants, or sedatives. Your provider will tell you about the possible dangers of taking these medicines.  © 3648-8789 [employer] [address] [place] [address]  [facility], [place] [address]. All rights reserved. This information is not intended as a substitute for professional medical care. Always follow your healthcare professional's instructions.    We hope your stay was comfortable as you heal now, mend and rest.    For we have enjoyed taking care of you by giving your our best.    And as you get better, by regaining your health and strength;   We count it as a privilege to have served you and hope your time at [facility] was well spent.      Thank  You!!!

## 2018-04-10 NOTE — H&P
CC: Cardiomyopathy      Chief Complaint   Patient presents with    Follow-up         Patient ID: Eva Corona is a 72 y.o. female with a past medical history of nonischemic cardiomyopathy, paroxysmal atrial fibrillation,     HPI  SHe is feeling much better with the medication changes.  Looking at her EKGs it looks like she is now back in atrial flutter.  She has had ablations done in the past however her hospital course was complicated with bleeding.  sHe is currently not very keen on a repeat ablation          Past Medical History:   Diagnosis Date    Anticoagulant long-term use      Atrial fibrillation      Cataract      CHF (congestive heart failure)      Diabetes mellitus      Encounter for blood transfusion      Hyperlipidemia      Hypertension      Hypothyroidism      On home oxygen therapy       2L, nightly    S/P ablation of atrial fibrillation 7/22/2015    Shortness of breath on exertion      Thyroid disease      Type 2 diabetes mellitus              Past Surgical History:   Procedure Laterality Date    CARDIAC CATHETERIZATION Right      CARDIAC ELECTROPHYSIOLOGY MAPPING AND ABLATION        COLONOSCOPY        EYE SURGERY Bilateral       cataract, implants    HYSTERECTOMY         TVH, ovaries intact, benign      Social History   Social History            Social History    Marital status:        Spouse name: N/A    Number of children: N/A    Years of education: N/A          Occupational History    Not on file.           Social History Main Topics    Smoking status: Never Smoker    Smokeless tobacco: Never Used    Alcohol use No    Drug use: No    Sexual activity: No           Other Topics Concern    Not on file          Social History Narrative    No narrative on file               Family History   Problem Relation Age of Onset    Diabetes Mother      Heart disease Sister      No Known Problems Brother      No Known Problems Daughter      No Known Problems Son       No Known Problems Brother      No Known Problems Brother      No Known Problems Son      Cancer Neg Hx                 Review of patient's allergies indicates:   Allergen Reactions    Codeine Nausea And Vomiting    Neuromuscular blockers, steroidal         Other reaction(s): Unknown    Morphine Nausea And Vomiting              Medication List with Changes/Refills   New Medications     BUMETANIDE (BUMEX) 0.5 MG TAB    Take 1 tablet (0.5 mg total) by mouth once daily.     BUMETANIDE (BUMEX) 1 MG TABLET    Take 1 tablet (1 mg total) by mouth once daily.     CARVEDILOL (COREG) 6.25 MG TABLET    Take 1 tablet (6.25 mg total) by mouth 2 (two) times daily with meals.     SACUBITRIL-VALSARTAN (ENTRESTO)  MG PER TABLET    Take 1 tablet by mouth 2 (two) times daily.   Current Medications     ACETAMINOPHEN (TYLENOL) 325 MG TABLET    Take 2 tablets (650 mg total) by mouth every 6 (six) hours as needed for Pain.     ASCORBIC ACID (VITAMIN C) 1000 MG TABLET    Take 1,000 mg by mouth once daily.     ATORVASTATIN (LIPITOR) 10 MG TABLET    TAKE 1 TABLET (10 MG TOTAL) BY MOUTH ONCE DAILY.     COCONUT OIL ORAL    Take by mouth.     FLUTICASONE (FLONASE) 50 MCG/ACTUATION NASAL SPRAY    USE 2 SPRAYS IN EACH NOSTRIL EVERY EVENING     GLIMEPIRIDE (AMARYL) 4 MG TABLET    TAKE 2 TABLETS EVERY DAY WITH BREAKFAST     LANSOPRAZOLE (PREVACID SOLUTAB) 30 MG DISINTEGRATING TABLET    Take 30 mg by mouth once daily.     LEVOTHYROXINE (SYNTHROID) 75 MCG TABLET    TAKE 1 TABLET EVERY DAY  BEFORE  BREAKFAST     METFORMIN (GLUCOPHAGE) 500 MG TABLET    Take 500 mg by mouth. Take one tablet on Sun, Tues, Thurs, and Saturday.  Take 1 1/2 tablets  on  Monday, Wed, and Friday      MULTIVIT-MIN/FA/CA CARB/VIT K (ONE-A-DAY WOMEN'S 50+ ORAL)    Take 1 tablet by mouth once daily.      POTASSIUM CHLORIDE SA (K-DUR,KLOR-CON) 20 MEQ TABLET    Take 1 tablet (20 mEq total) by mouth once daily.     SPIRONOLACTONE (ALDACTONE) 25 MG TABLET    Take 1 tablet  "(25 mg total) by mouth once daily.     VITAMIN A ORAL    Take 1 tablet by mouth once daily.      WARFARIN (COUMADIN) 5 MG TABLET    TAKE 1 AND 1/2 TABLETS (7.5 MG)  ON MON, WED, FRI. AND TAKE 1 TABLET (5 MG) ALL OTHER DAYS.   Discontinued Medications     BUMETANIDE (BUMEX) 1 MG TABLET    Take 1 tablet (1 mg total) by mouth 2 (two) times daily.     CARVEDILOL (COREG) 3.125 MG TABLET    Take 1 tablet (3.125 mg total) by mouth 2 (two) times daily with meals.     GABAPENTIN (NEURONTIN) 300 MG CAPSULE    Take 300 mg by mouth every evening. Take 3 capsules every evening     METOLAZONE (ZAROXOLYN) 2.5 MG TABLET    Take 2 tablets (5 mg total) by mouth once daily.     SACUBITRIL-VALSARTAN (ENTRESTO) 49-51 MG PER TABLET    Take 1 tablet by mouth 2 (two) times daily.         Review of Systems  Constitution: Denies chills, fever, and sweats.  HENT: Denies headaches or blurry vision.  Cardiovascular: Denies chest pain or irregular heart beat.  Respiratory: Denies cough or shortness of breath.  Gastrointestinal: Denies abdominal pain, nausea, or vomiting.  Musculoskeletal: Denies muscle cramps.  Neurological: Denies dizziness or focal weakness.  Psychiatric/Behavioral: Normal mental status.  Hematologic/Lymphatic: Denies bleeding problem or easy bruising/bleeding.  Skin: Denies rash or suspicious lesions     Physical Examination  BP (!) 105/56 (BP Location: Left arm, Patient Position: Sitting)   Pulse 94   Resp 16   Ht 5' 5" (1.651 m)   Wt 85 kg (187 lb 6.3 oz)   LMP  (LMP Unknown)   SpO2 (!) 93%   BMI 31.18 kg/m²      Constitutional: No acute distress, conversant  HEENT: Sclera anicteric, Pupils equal, round and reactive to light, extraocular motions intact, Oropharynx clear  Neck: No JVD, no carotid bruits  Cardiovascular: irregular rate , no murmur, rubs or gallops, normal S1/S2  Pulmonary: Clear to auscultation bilaterally  Abdominal: Abdomen soft, nontender, nondistended, positive bowel sounds  Extremities: No lower " extremity edema,   Pulses:  Carotid pulses are 2+ on the right side, and 2+ on the left side.  Radial pulses are 2+ on the right side, and 2+ on the left side.      Skin: No ecchymosis, erythema, or ulcers  Psych: Alert and oriented x 3, appropriate affect  Neuro: CNII-XII intact, no focal deficits     Labs:  Most Recent Data  CBC:         Lab Results   Component Value Date     WBC 9.00 11/14/2017     HGB 12.4 11/14/2017     HCT 38.0 11/14/2017      11/14/2017     MCV 83 11/14/2017     RDW 16.8 (H) 11/14/2017      BMP:         Lab Results   Component Value Date      03/05/2018     K 4.0 03/05/2018     CL 88 (L) 03/05/2018     CO2 39 (H) 03/05/2018     BUN 32 (H) 03/05/2018     CREATININE 1.2 03/05/2018      (H) 03/05/2018     CALCIUM 9.9 03/05/2018     MG 1.7 11/14/2017     PHOS 3.7 03/31/2015      LFTS;   Lab Results   Component Value Date     PROT 7.2 03/05/2018     ALBUMIN 3.8 03/05/2018     BILITOT 0.7 03/05/2018     AST 17 03/05/2018     ALKPHOS 86 03/05/2018     ALT 17 03/05/2018      COAGS:         Lab Results   Component Value Date     INR 2.6 02/27/2018      FLP:         Lab Results   Component Value Date     CHOL 112 (L) 12/02/2016     HDL 26 (L) 12/02/2016     LDLCALC 57.4 (L) 12/02/2016     TRIG 143 12/02/2016     CHOLHDL 23.2 12/02/2016      CARDIAC:         Lab Results   Component Value Date     TROPONINI 0.010 10/20/2017      (H) 11/14/2017        Imaging:     EKG: Atrial flutter.     Echo:  CONCLUSIONS     1 - Mildly enlarged ascending aorta.     2 - Biatrial enlargement.     3 - Eccentric hypertrophy.     4 - Severely depressed left ventricular systolic function (EF 10-15%).     5 - Impaired LV relaxation, elevated LAP (grade 2 diastolic dysfunction).     6 - Right ventricular enlargement with mildly depressed systolic function.     7 - Moderate to severe tricuspid regurgitation.     8 - Moderate mitral regurgitation.     9 - Mild aortic regurgitation.     10 - Pulmonary  hypertension. The estimated PA systolic pressure is 66 mmHg.     11 - Intermediate central venous pressure.     12 - Significant change from echo on 11/30/2015.         I have personally reviewed these images and echo data     Assessment/Plan:  Eva was seen today for follow-up.     Diagnoses and all orders for this visit:     Persistent atrial fibrillation  -     Comprehensive metabolic panel; Future     Hypertension, unspecified type  -     EKG 12-lead     Other cardiomyopathy     Pulmonary hypertension     Other orders  -     bumetanide (BUMEX) 0.5 MG Tab; Take 1 tablet (0.5 mg total) by mouth once daily.  -     sacubitril-valsartan (ENTRESTO)  mg per tablet; Take 1 tablet by mouth 2 (two) times daily.  -     bumetanide (BUMEX) 1 MG tablet; Take 1 tablet (1 mg total) by mouth once daily.  -     carvedilol (COREG) 6.25 MG tablet; Take 1 tablet (6.25 mg total) by mouth 2 (two) times daily with meals.           She is not very keen on electrical cardioversion.  I recommended that we should try and get her into rhythm.  We will try to do a chemical cardioversion on her.  Her INR seems to be therapeutic for the last 2 or 3 times it has been checked.  I would check it a couple of times more before attempting the chemical cardioversion.  I have made some changes to her regimen as mentioned above.    Patient Active Problem List   Diagnosis    Hypothyroid    Hypertension associated with diabetes    Acute on chronic diastolic heart failure    Long term current use of anticoagulant therapy    Cardiomyopathy    Persistent atrial fibrillation    Hyperlipidemia associated with type 2 diabetes mellitus    Retroperitoneal hematoma    Pulmonary hypertension    Obesity, Class I, BMI 30-34.9    Anemia    Uncontrolled type 2 diabetes mellitus without complication, without long-term current use of insulin    Abdominal aortic atherosclerosis    Nonischemic cardiomyopathy    Cardiac LV ejection fraction 10-20%

## 2018-04-11 NOTE — DISCHARGE SUMMARY
[facility]  Discharge Note  Short Stay    Admit Date: 4/10/2018    Discharge Date and Time: 4/10/2018  2:23 PM     Attending Physician: Dr. London    Discharge Provider: Finn Avalos    Diagnoses:  Active Hospital Problems    Diagnosis  POA    *Persistent atrial fibrillation [I48.1]  Yes    Nonischemic cardiomyopathy [I42.8]  Yes    Long term current use of anticoagulant therapy [Z79.01]  Not Applicable      Resolved Hospital Problems    Diagnosis Date Resolved POA   No resolved problems to display.       Discharged Condition: good    Hospital Course: Patient was admitted for an outpatient procedure and tolerated the procedure well with no complications.    Final Diagnoses: Same as principal problem.    Disposition: Home or Self Care    Follow up/Patient Instructions:    Medications:  Reconciled Home Medications:      Medication List      CHANGE how you take these medications    * bumetanide 0.5 MG Tab  Commonly known as:  BUMEX  Take 1 tablet (0.5 mg total) by mouth once daily.  What changed:  when to take this     * bumetanide 1 MG tablet  Commonly known as:  BUMEX  Take 1 tablet (1 mg total) by mouth once daily.  What changed:  Another medication with the same name was changed. Make sure you understand how and when to take each.        * This list has 2 medication(s) that are the same as other medications prescribed for you. Read the directions carefully, and ask your doctor or other care provider to review them with you.            CONTINUE taking these medications    acetaminophen 325 MG tablet  Commonly known as:  TYLENOL  Take 2 tablets (650 mg total) by mouth every 6 (six) hours as needed for Pain.     amLODIPine 5 MG tablet  Commonly known as:  NORVASC  TAKE 1 TABLET EVERY DAY     atorvastatin 10 MG tablet  Commonly known as:  LIPITOR  TAKE 1 TABLET EVERY DAY     carvedilol 6.25 MG tablet  Commonly known as:  COREG  Take 1 tablet (6.25 mg total) by mouth 2 (two) times daily with meals.      COCONUT OIL ORAL  Take by mouth.     fluticasone 50 mcg/actuation nasal spray  Commonly known as:  FLONASE  USE 2 SPRAYS IN EACH NOSTRIL EVERY EVENING     glimepiride 4 MG tablet  Commonly known as:  AMARYL  TAKE 2 TABLETS EVERY DAY WITH BREAKFAST     levothyroxine 75 MCG tablet  Commonly known as:  SYNTHROID  TAKE 1 TABLET EVERY DAY  BEFORE  BREAKFAST     metFORMIN 500 MG tablet  Commonly known as:  GLUCOPHAGE  Take 500 mg by mouth. Take one tablet on Sun, Tues, Thurs, and Saturday.  Take 1 1/2 tablets  on  Monday, Wed, and Friday     ONE-A-DAY WOMEN'S 50+ ORAL  Take 1 tablet by mouth once daily.     potassium chloride SA 20 MEQ tablet  Commonly known as:  K-DUR,KLOR-CON  Take 1 tablet (20 mEq total) by mouth once daily.     PREVACID SOLUTAB 30 MG disintegrating tablet  Generic drug:  lansoprazole  Take 30 mg by mouth once daily.     sacubitril-valsartan  mg per tablet  Commonly known as:  ENTRESTO  Take 1 tablet by mouth 2 (two) times daily.     spironolactone 25 MG tablet  Commonly known as:  ALDACTONE  Take 1 tablet (25 mg total) by mouth once daily.     VITAMIN A ORAL  Take 1 tablet by mouth once daily.     VITAMIN C 1000 MG tablet  Generic drug:  ascorbic acid (vitamin C)  Take 1,000 mg by mouth once daily.     warfarin 5 MG tablet  Commonly known as:  COUMADIN  TAKE 1 AND 1/2 TABLETS (7.5 MG)  ON MON, WED, FRI. AND TAKE 1 TABLET (5 MG) ALL OTHER DAYS.            Discharge Procedure Orders  Diet general     Activity as tolerated       Follow-up Information     Josef London MD In 1 week.    Specialty:  Cardiology  Contact information:  [address]  [address]  [place] [facility] [place] [address]  [phone]                   Discharge Procedure Orders (must include Diet, Follow-up, Activity):    Discharge Procedure Orders (must include Diet, Follow-up, Activity)  Diet general     Activity as tolerated

## 2018-04-17 ENCOUNTER — OFFICE VISIT (OUTPATIENT)
Dept: CARDIOLOGY | Facility: CLINIC | Age: 73
End: 2018-04-17
Payer: MEDICARE

## 2018-04-17 VITALS
OXYGEN SATURATION: 91 % | WEIGHT: 184.94 LBS | SYSTOLIC BLOOD PRESSURE: 126 MMHG | BODY MASS INDEX: 30.81 KG/M2 | DIASTOLIC BLOOD PRESSURE: 70 MMHG | HEART RATE: 108 BPM | HEIGHT: 65 IN

## 2018-04-17 DIAGNOSIS — I10 HYPERTENSION, UNSPECIFIED TYPE: ICD-10-CM

## 2018-04-17 DIAGNOSIS — I42.9 CARDIOMYOPATHY, UNSPECIFIED TYPE: Primary | ICD-10-CM

## 2018-04-17 PROCEDURE — 3074F SYST BP LT 130 MM HG: CPT | Mod: CPTII,S$GLB,, | Performed by: INTERNAL MEDICINE

## 2018-04-17 PROCEDURE — 99999 PR PBB SHADOW E&M-EST. PATIENT-LVL IV: CPT | Mod: PBBFAC,,, | Performed by: INTERNAL MEDICINE

## 2018-04-17 PROCEDURE — 3078F DIAST BP <80 MM HG: CPT | Mod: CPTII,S$GLB,, | Performed by: INTERNAL MEDICINE

## 2018-04-17 PROCEDURE — 93000 ELECTROCARDIOGRAM COMPLETE: CPT | Mod: S$GLB,,, | Performed by: INTERNAL MEDICINE

## 2018-04-17 PROCEDURE — 99214 OFFICE O/P EST MOD 30 MIN: CPT | Mod: S$GLB,,, | Performed by: INTERNAL MEDICINE

## 2018-04-17 NOTE — PROGRESS NOTES
[facility]    CC: Cardiomyopathy  Chief Complaint   Patient presents with    Hospital Follow Up     Post DCCV       Patient ID: Eva Corona is a 72 y.o. female with a past medical history of cardiomyopathy, paroxysmal atrial flutter     HPI  she underwent DC cardioversion.  Just immediately after the shock she went back into atrial flutter.  However it seems like she has eventually converted back into sinus rhythm.  sHe is feeling much better.  She is currently in NYHA class I symptoms    Past Medical History:   Diagnosis Date    Anticoagulant long-term use     Atrial fibrillation     Cataract     CHF (congestive heart failure)     Diabetes mellitus     Encounter for blood transfusion     Hyperlipidemia     Hypertension     Hypothyroidism     On home oxygen therapy     2L, nightly    S/P ablation of atrial fibrillation 7/22/2015    Shortness of breath on exertion     Thyroid disease     Type 2 diabetes mellitus      Past Surgical History:   Procedure Laterality Date    CARDIAC CATHETERIZATION Right     CARDIAC ELECTROPHYSIOLOGY MAPPING AND ABLATION      COLONOSCOPY      EYE SURGERY Bilateral     cataract, implants    HYSTERECTOMY      TVH, ovaries intact, benign     Social History     Social History    Marital status:      Spouse name: N/A    Number of children: N/A    Years of education: N/A     Occupational History    Not on file.     Social History Main Topics    Smoking status: Never Smoker    Smokeless tobacco: Never Used    Alcohol use No    Drug use: No    Sexual activity: No     Other Topics Concern    Not on file     Social History Narrative    No narrative on file     Family History   Problem Relation Age of Onset    Diabetes Mother     Heart disease Sister     No Known Problems Brother     No Known Problems Daughter     No Known Problems Son     No Known Problems Brother     No Known Problems Brother     No Known Problems Son     Cancer Neg  Hx        Review of patient's allergies indicates:   Allergen Reactions    Codeine Nausea And Vomiting    Neuromuscular blockers, steroidal      Other reaction(s): Unknown    Morphine Nausea And Vomiting       Medication List with Changes/Refills   Current Medications    ACETAMINOPHEN (TYLENOL) 325 MG TABLET    Take 2 tablets (650 mg total) by mouth every 6 (six) hours as needed for Pain.    AMLODIPINE (NORVASC) 5 MG TABLET    TAKE 1 TABLET EVERY DAY    ASCORBIC ACID (VITAMIN C) 1000 MG TABLET    Take 1,000 mg by mouth once daily.    ATORVASTATIN (LIPITOR) 10 MG TABLET    TAKE 1 TABLET EVERY DAY    BUMETANIDE (BUMEX) 0.5 MG TAB    Take 1 tablet (0.5 mg total) by mouth once daily.    BUMETANIDE (BUMEX) 1 MG TABLET    Take 1 tablet (1 mg total) by mouth once daily.    CARVEDILOL (COREG) 6.25 MG TABLET    Take 1 tablet (6.25 mg total) by mouth 2 (two) times daily with meals.    COCONUT OIL ORAL    Take by mouth.    FLUTICASONE (FLONASE) 50 MCG/ACTUATION NASAL SPRAY    USE 2 SPRAYS IN EACH NOSTRIL EVERY EVENING    GLIMEPIRIDE (AMARYL) 4 MG TABLET    TAKE 2 TABLETS EVERY DAY WITH BREAKFAST    LANSOPRAZOLE (PREVACID SOLUTAB) 30 MG DISINTEGRATING TABLET    Take 30 mg by mouth once daily.    LEVOTHYROXINE (SYNTHROID) 75 MCG TABLET    TAKE 1 TABLET EVERY DAY  BEFORE  BREAKFAST    METFORMIN (GLUCOPHAGE) 500 MG TABLET    Take 500 mg by mouth. Take one tablet on Sun, Tues, Thurs, and Saturday.  Take 1 1/2 tablets  on  Monday, Wed, and Friday     MULTIVIT-MIN/FA/CA CARB/VIT K (ONE-A-DAY WOMEN'S 50+ ORAL)    Take 1 tablet by mouth once daily.     POTASSIUM CHLORIDE SA (K-DUR,KLOR-CON) 20 MEQ TABLET    Take 1 tablet (20 mEq total) by mouth once daily.    SACUBITRIL-VALSARTAN (ENTRESTO)  MG PER TABLET    Take 1 tablet by mouth 2 (two) times daily.    SPIRONOLACTONE (ALDACTONE) 25 MG TABLET    Take 1 tablet (25 mg total) by mouth once daily.    VITAMIN A ORAL    Take 1 tablet by mouth once daily.     WARFARIN (COUMADIN) 5  "MG TABLET    TAKE 1 AND 1/2 TABLETS (7.5 MG)  ON MON, WED, FRI. AND TAKE 1 TABLET (5 MG) ALL OTHER DAYS.       Review of Systems  Constitution: Denies chills, fever, and sweats.  HENT: Denies headaches or blurry vision.  Cardiovascular: Denies chest pain or irregular heart beat.  Respiratory: Denies cough or shortness of breath.  Gastrointestinal: Denies abdominal pain, nausea, or vomiting.  Musculoskeletal: Denies muscle cramps.  Neurological: Denies dizziness or focal weakness.  Psychiatric/Behavioral: Normal mental status.  Hematologic/Lymphatic: Denies bleeding problem or easy bruising/bleeding.  Skin: Denies rash or suspicious lesions    Physical Examination  /70 (BP Location: Left arm)   Pulse 108   Ht 5' 5" (1.651 m)   Wt 83.9 kg (184 lb 15.5 oz)   LMP  (LMP Unknown)   SpO2 (!) 91%   BMI 30.78 kg/m²     Constitutional: No acute distress, conversant  HEENT: Sclera anicteric, Pupils equal, round and reactive to light, extraocular motions intact, Oropharynx clear  Neck: No JVD, no carotid bruits  Cardiovascular: regular rate and rhythm, no murmur, rubs or gallops, normal S1/S2  Pulmonary: Clear to auscultation bilaterally  Abdominal: Abdomen soft, nontender, nondistended, positive bowel sounds  Extremities: No lower extremity edema,   Pulses:  Carotid pulses are 2+ on the right side, and 2+ on the left side.  Radial pulses are 2+ on the right side, and 2+ on the left side.   .    Skin: No ecchymosis, erythema, or ulcers  Psych: Alert and oriented x 3, appropriate affect  Neuro: CNII-XII intact, no focal deficits    Labs:  Most Recent Data  CBC:   Lab Results   Component Value Date    WBC 5.80 04/10/2018    HGB 11.9 (L) 04/10/2018    HCT 36.6 (L) 04/10/2018     04/10/2018    MCV 84 04/10/2018    RDW 15.2 (H) 04/10/2018     BMP:   Lab Results   Component Value Date     03/27/2018    K 5.0 03/27/2018     03/27/2018    CO2 32 (H) 03/27/2018    BUN 23 03/27/2018    CREATININE 1.1 " 03/27/2018     (H) 03/27/2018    CALCIUM 9.8 03/27/2018    MG 1.7 11/14/2017    PHOS 3.7 03/31/2015     LFTS;   Lab Results   Component Value Date    PROT 7.1 03/27/2018    ALBUMIN 3.9 03/27/2018    BILITOT 0.5 03/27/2018    AST 17 03/27/2018    ALKPHOS 72 03/27/2018    ALT 21 03/27/2018     COAGS:   Lab Results   Component Value Date    INR 3.0 (H) 03/27/2018     FLP:   Lab Results   Component Value Date    CHOL 112 (L) 12/02/2016    HDL 26 (L) 12/02/2016    LDLCALC 57.4 (L) 12/02/2016    TRIG 143 12/02/2016    CHOLHDL 23.2 12/02/2016     CARDIAC:   Lab Results   Component Value Date    TROPONINI 0.010 10/20/2017     (H) 11/14/2017       Imaging:    EKG: Sinus tachycardia    I have personally reviewed these images and echo data    Assessment/Plan:  Eva was seen today for hospital follow up.    Diagnoses and all orders for this visit:    Cardiomyopathy, unspecified type  -     Transthoracic echo (TTE) complete (CUPID ONLY-[facility]); Future  -     Comprehensive metabolic panel; Future  -     CBC auto differential; Future    Hypertension, unspecified type  -     EKG 12-lead      I'll see her back in 4 months.        Total duration of face to face visit time 30 minutes.  Total time spent counseling greater than fifty percent of total visit time.  Counseling included discussion regarding imaging findings, diagnosis, possibilities, treatment options, risks and benefits.  The patient had many questions regarding the options and long-term effect which were all answered to my best ability.      Josef London MD,MRCP,RPVI,FACC,FSCAI.  Interventional Cardiology   Phone [phone]

## 2018-04-18 DIAGNOSIS — I42.9 CARDIOMYOPATHY, UNSPECIFIED TYPE: Primary | ICD-10-CM

## 2018-04-18 NOTE — MR AVS SNAPSHOT
[facility]  [address]  [place] [address]  Phone: [phone]                  Eva Corona   3/7/2017 2:30 PM   Anti-coag visit    Description:  Female : 1945   Provider:  Beronica Ivory PharmD   Department:  [facility]           Diagnoses this Visit        Comments    Long term current use of anticoagulant therapy    -  Primary     Atrial fibrillation, unspecified type                To Do List           Future Appointments        Provider Department Dept Phone    2017 2:30 PM Beronica Ivory, [facility] [phone]    2017 3:00 PM Justus Cleveland MD [facility] [phone]      Goals (5 Years of Data)     None      [facility]     [facility] -  Assistance  Registered nurses in the [facility] provide clinical advisement, health education, appointment booking, and other advisory services.  Call for this free service at [phone].             Medications           Message regarding Medications     Verify the changes and/or additions to your medication regime listed below are the same as discussed with your clinician today.  If any of these changes or additions are incorrect, please notify your healthcare provider.             Verify that the below list of medications is an accurate representation of the medications you are currently taking.  If none reported, the list may be blank. If incorrect, please contact your healthcare provider. Carry this list with you in case of emergency.           Current Medications     acetaminophen (TYLENOL) 325 MG tablet Take 2 tablets (650 mg total) by mouth every 6 (six) hours as needed for Pain.    amlodipine (NORVASC) 5 MG tablet TAKE 1 TABLET (5 MG TOTAL) BY MOUTH ONCE DAILY.    ascorbic acid (VITAMIN C) 1000 MG tablet Take 1,000 mg by mouth once daily.    atorvastatin (LIPITOR) 10 MG tablet TAKE 1 TABLET (10 MG TOTAL) BY MOUTH    Ovarian Cysts    Ovarian cysts are sacs filled with fluid or tissue that form on or inside the ovaries. The ovaries are two small organs located on each side of a woman’s uterus (womb). They are part of the female reproductive system.  Ovarian cysts are common in women, especially during childbearing years. There are different types of cysts. Most are harmless (benign) and go away on their own. They often cause no symptoms. If symptoms do occur, they can include mild pain or pressure in the lower belly (abdomen).  Cysts that are large or break (rupture) may cause more severe pain and symptoms. In these cases, hospital care or treatment such as surgery may be needed. More extensive treatment may also be needed if a cyst causes an ovary to twist (called torsion) or if a cyst is suspected to be cancerous. Keep in mind that most cysts are not cancerous, however.  General care  · To help relieve pain, your healthcare provider may recommend using over-the-counter pain medicine. If needed, stronger pain medicine may be prescribed.  · Depending on the type of cyst you have, your healthcare provider may advise taking birth control pills. These help shrink cysts in certain cases. They may also help prevent new cysts from forming. Be sure to take these medicines as directed if they are prescribed.  · Your healthcare provider may advise you to watch your symptoms over time to see if they go away or worsen. Regular ultrasound tests may also be advised. These can help check if a cyst goes away or grows in size.  Follow-up care  Follow up with your healthcare provider, or as advised.  When to seek medical advice  Call your healthcare provider right away if any of these occur:  · Pain worsens or fails to get better with home treatment  · Fever of 100.4°F (38°C) or higher (or other fever amount directed by your healthcare provider)  · Nausea and vomiting  · Weakness, dizziness, or fainting  · Abnormal vaginal bleeding  © 7164-8653  ONCE DAILY.    COCONUT OIL ORAL Take by mouth.    fluticasone (FLONASE) 50 mcg/actuation nasal spray 2 sprays by Each Nare route every evening.    furosemide (LASIX) 40 MG tablet TAKE 1 TABLET (40 MG TOTAL) BY MOUTH 2 (TWO) TIMES DAILY.    gabapentin (NEURONTIN) 300 MG capsule TAKE 2 CAPSULES (600 MG TOTAL) BY MOUTH 3 (THREE) TIMES DAILY.    glimepiride (AMARYL) 4 MG tablet Take 2 tablets (8 mg total) by mouth daily with breakfast.    lansoprazole (PREVACID SOLUTAB) 30 MG disintegrating tablet Take 30 mg by mouth once daily.    levothyroxine (SYNTHROID) 75 MCG tablet TAKE 1 TABLET BY MOUTH ONCE A DAY BEFORE BREAKFAST    metformin (GLUCOPHAGE) 500 MG tablet Take 1 tablet (500 mg total) by mouth 2 (two) times daily with meals.    metoprolol succinate (TOPROL-XL) 100 MG 24 hr tablet Take 1 tablet (100 mg total) by mouth once daily.    MULTIVIT-MIN/FA/CA CARB/VIT K (ONE-A-DAY WOMEN'S 50+ ORAL) Take 1 tablet by mouth once daily.     potassium chloride SA (K-DUR,KLOR-CON) 20 MEQ tablet Take 1 tablet (20 mEq total) by mouth once daily.    VITAMIN A ORAL Take 1 tablet by mouth once daily.     warfarin (COUMADIN) 5 MG tablet 1.5 tablets (7.5 mg) PO on Mon, Wed, Fri.  1 tablet PO (5 mg) all other days.           Clinical Reference Information           Your Vitals Were     Last Period                   (LMP Unknown)           Allergies as of 3/7/2017     Codeine    Neuromuscular Blockers, Steroidal    Morphine      Immunizations Administered on Date of Encounter - 3/7/2017     None      Orders Placed During Today's Visit      Normal Orders This Visit    POCT INR          3/7/2017  2:33 PM - Beronica Ivory, PharmD      Component Results     Component    INR    2.1      February 2017 Details    Sun Mon Tue Wed Thu Fri Sat        1               2               3               4                 5      5 mg         6      2.5 mg         7   1.7   5 mg   See details      8      5 mg         9      5 mg         10      5 mg        [employer]. [address] [place] [address]. All rights reserved. This information is not intended as a substitute for professional medical care. Always follow your healthcare professional's instructions.      Bladder Infection, Female (Adult)    Normally, bacteria do not stay in the urine. But when they do, the urine can become infected. This is called a urinary tract infection (UTI). An infection can occur anywhere in the urinary tract, from the kidney to the bladder and urethra. The most common place for a UTI is in the bladder. This is called a bladder infection. This is one of the most common infections in women. Most bladder infections are easily treated. They are not serious unless the infection spreads up to the kidney.  The phrases \"bladder infection\", \"UTI,\" and \"cystitis,\" are often used to describe the same thing, but they are not always the same. Cystitis is an inflammation of the bladder. The most common cause of cystitis is an infection.  In summary:  · Infections in the urine are called UTIs.  · Cystitis is usually caused by a UTI.  · Not al UTIs and cases of cystitis are bladder infections.  · Bladder infections are the most common type of cystitis.  Symptoms  The infection causes inflammation in the urethra and bladder, which causes many of the symptoms. The most common symptoms of a bladder infection are:  · Pain or burning when urinating  · Having to urinate more often than usual  · Urgent need to urinate  · Only a small amount of urine comes out  · Blood in urine  · Abdominal discomfort, usually in the lower abdomen, above the pubic bone  · Cloudy, strong, or bad smelling urine  · Urinary retention, being unable to urinate  · Unable to hold urine in (urinary incontinence)  · Fever  · Loss of appetite  · Confusion (in older adults)  Causes  Bladder infections are not contagious. You can't get one from someone else, from a toilet seat, or from sharing a bath.  The most    11      5 mg           12      5 mg         13      5 mg         14      5 mg         15      7.5 mg         16      5 mg         17      7.5 mg         18      5 mg           19      5 mg         20      7.5 mg         21      5 mg         22      7.5 mg         23      5 mg         24      7.5 mg         25      5 mg           26      5 mg         27      7.5 mg         28      5 mg              Date Details   02/07 Last INR check   INR: 1.7                 March 2017 Details    Sun Mon Tue Wed u Fri Sat        1      7.5 mg         2      5 mg         3      7.5 mg         4      5 mg           5      5 mg         6      7.5 mg         7   2.1   5 mg   See details      8      7.5 mg         9      5 mg         10      7.5 mg         11      5 mg           12      5 mg         13      7.5 mg         14      5 mg         15      7.5 mg         16      5 mg         17      7.5 mg         18      5 mg           19      5 mg         20      7.5 mg         21      5 mg         22      7.5 mg         23      5 mg         24      7.5 mg         25      5 mg           26      5 mg         27      7.5 mg         28      5 mg         29      7.5 mg         30      5 mg         31      7.5 mg           Date Details   03/07 This INR check   INR: 2.1                     How to take your warfarin dose     To take:  5 mg Take 1 of the 5 mg tablets.    To take:  7.5 mg Take 1.5 of the 5 mg tablets.           April 2017 Details    Sun Mon Tue Wed Thu Fri Sat           1      5 mg           2      5 mg         3      7.5 mg         4      5 mg         5      7.5 mg         6      5 mg         7      7.5 mg         8      5 mg           9      5 mg         10      7.5 mg         11            12               13               14               15                 16               17               18               19               20               21               22                 23               24               25               common cause of bladder infections is bacteria from the bowels. The bacteria get onto the skin around the opening of the urethra. From there it can get into the urine and travel up to the bladder, causing inflammation and infection. This usually happens because of:  · Wiping improperly after urinating. Always wipe from front to back.  · Bowel incontinence  · Pregnancy  · Procedures such as having a catheter inserted  · Older age  · Not emptying your bladder (stagnated urine gives bacteria a chance to grow)  · Dehydration  · Constipation  · Sex  · Use of a diaphragm for birth control   Treatment  Bladder infections are diagnosed by a urine test. They are treated with antibiotics and usually clear up quickly without complications. Treatment helps prevent a more serious kidney infection.  Medicines  Medicines can help in the treatment of a bladder infection:  · Take antibiotics until they are used up, even if you feel better. It is important to finish them to make sure the infection has cleared.  · You can use acetaminophen or ibuprofen for pain, fever, or discomfort, unless another medicine was prescribed. You can also alternate them, or use both together. They work differently and are a different class of medicines, so taking them together is not an overdose. If you have chronic liver or kidney disease, talk with your healthcare provider before using these medicines. Also talk with your provider if you've ever had a stomach ulcer or gastrointestinal bleeding, or are taking blood-thinner medicines.  · If you are given phenazopydridine to reduce burning with urination, it will cause your urine to become a bright orange color. This can stain clothing.  Care and prevention  These self-care steps can help prevent future infections:  · Drink plenty of fluids to prevent dehydration and flush out of the bladder. Do this unless you must restrict fluids for other health reasons, or your doctor told you not to.  · Proper   26               27               28               29                 30                      Date Details   No additional details    Date of next INR:  4/11/2017         How to take your warfarin dose     To take:  5 mg Take 1 of the 5 mg tablets.    To take:  7.5 mg Take 1.5 of the 5 mg tablets.           Anticoagulation Summary as of 3/7/2017     Maintenance plan 7.5 mg (5 mg x 1.5) on Mon, Wed, Fri; 5 mg (5 mg x 1) all other days    Full instructions 7.5 mg on Mon, Wed, Fri; 5 mg all other days    Next INR check 4/11/2017      Anticoagulation Episode Summary     Comments [facility]  (po)      Language Assistance Services     ATTENTION: Language assistance services are available, free of charge. Please call [phone].      ATENCIÓN: Si fabiola harris, tiene a galdamez disposición servicios gratuitos de asistencia lingüística. Llame al [phone].     MAYA Ý: N?u b?n nói Ti?ng Vi?t, có các d?ch v? h? tr? ngôn ng? mi?n phí dành cho b?n. G?i s? [phone].         [facility] complies with applicable Federal civil rights laws and does not discriminate on the basis of race, color, national origin, age, disability, or sex.         cleaning after going to the bathroom is important. Wipe from front to back after using the toilet to prevent the spread of bacteria.  · Urinate more often. Don't try to hold urine in for a long time.  · Wear loose-fitting clothes and cotton underwear. Avoid tight-fitting pans.  · Improve your diet and prevent constipation. Eat more fresh fruit and vegetables, and fiber, and less junk and fatty foods.  · Avoid sex until your symptoms are gone.  · Avoid caffeine, alcohol, and spicy foods. These can irritate the bladder.  · Urinate right after intercourse to flush out the bladder.  · If you use birth control pills and have frequent bladder infections, discuss it with your doctor.  Follow-up care  Call your healthcare provider if all symptoms are not gone after 3 days of treatment. This is especially important if you have repeat infections.  If a culture was done, you will be told if your treatment needs to be changed. If directed, you can call to find out the results.  If X-rays were done, you will be told if the results will affect your treatment.  Call 911  Call emergency services if any of the following occur:  · Trouble breathing  · Difficulty arousing or confusion  · Fainting or loss of consciousness  · Rapid heart rate  When to seek medical advice  Call your healthcare provider right away if any of these occur:  · Fever of 100.4ºF (38.0ºC) or higher, or as directed  · Symptoms are not better by the third day of treatment  · Back or belly (abdominal) pain that gets worse  · Repeated vomiting, or unable to keep medicine down  · Weakness or dizziness  · Vaginal discharge  · Pain, redness, or swelling in the outer vaginal area (labia)  © 2155-4085 [employer]. [address] [place] [address]. All rights reserved. This information is not intended as a substitute for professional medical care. Always follow your healthcare professional's instructions.      Bacterial Vaginosis    You have a  vaginal infection called bacterial vaginosis (BV). Both good and bad bacteria are present in a healthy vagina. BV occurs when these bacteria get out of balance. The number of bad bacteria increase. And the number of good bacteria decrease.  BV may or may not cause symptoms. If symptoms do occur, they can include:  · Thin, gray, milky-white, or sometimes green discharge  · Unpleasant odor or “fishy” smell  · Itching, burning, or pain in or around the vagina  It is not known what causes BV, but certain factors can make the problem more likely. This can include:  · Douching  · Having sex with a new partner  · Having sex with more than one partner  BV will sometimes go away on its own. But treatment is usually recommended. This is because untreated BV can increase the risk of more serious health problems such as:  · Pelvic inflammatory disease (PID)  ·  delivery (giving birth to a baby early if you’re pregnant)  · HIV and certain other sexually transmitted diseases (STDs)  · Infection after surgery on the reproductive organs  Home care  General care  · BV is most often treated with medicines called antibiotics. These may be given as pills or as a vaginal cream. If antibiotics are prescribed, be sure to use them exactly as directed. Also, be sure to complete all of the medicine, even if your symptoms go away.  · Avoid douching or having sex during treatment.  · If you have sex with a female partner, ask your healthcare provider if she should also be treated.  Prevention  · Limit or avoid douching.  · Avoid having sex. If you do have sex, then take steps to lower your risk:  ¨ Use condoms when having sex.  ¨ Limit the number of partners you have sex with.  Follow-up care  Follow up with your healthcare provider, or as advised.  When to seek medical advice  Call your healthcare provider right away if:  · You have a fever of 100.4ºF (38ºC) or higher, or as directed by your provider.  · Your symptoms worsen, or they  don’t go away within a few days of starting treatment.  · You have new pain in the lower belly or pelvic region.  · You have side effects that bother you or a reaction to the pills or cream you’re prescribed.  · You or any partners you have sex with have new symptoms, such as a rash, joint pain, or sores.      © 0221-5881 [employer]. [address] [place] [address]. All rights reserved. This information is not intended as a substitute for professional medical care. Always follow your healthcare professional's instructions.

## 2018-04-24 ENCOUNTER — ANTI-COAG VISIT (OUTPATIENT)
Dept: CARDIOLOGY | Facility: CLINIC | Age: 73
End: 2018-04-24
Payer: MEDICARE

## 2018-04-24 DIAGNOSIS — Z79.01 LONG TERM CURRENT USE OF ANTICOAGULANT THERAPY: Primary | ICD-10-CM

## 2018-04-24 LAB — INR PPP: 2.5 (ref 2–3)

## 2018-04-24 PROCEDURE — 99211 OFF/OP EST MAY X REQ PHY/QHP: CPT | Mod: 25,S$GLB,, | Performed by: PHARMACIST

## 2018-04-24 PROCEDURE — 85610 PROTHROMBIN TIME: CPT | Mod: QW,S$GLB,, | Performed by: PHARMACIST

## 2018-04-24 NOTE — PROGRESS NOTES
INR in range, pt reports she had DCCV 4/10, CC unaware of this. This was unsuccessful.  She reports she was started on Entresto about a month ago.  CC not notified of this either.  Pt encouraged to contact CC with all med changes and procedures.

## 2018-06-05 RX ORDER — METFORMIN HYDROCHLORIDE 500 MG/1
TABLET ORAL
Qty: 120 TABLET | Refills: 0 | Status: SHIPPED | OUTPATIENT
Start: 2018-06-05 | End: 2018-07-12 | Stop reason: SDUPTHER

## 2018-06-11 ENCOUNTER — PES CALL (OUTPATIENT)
Dept: ADMINISTRATIVE | Facility: CLINIC | Age: 73
End: 2018-06-11

## 2018-06-12 ENCOUNTER — ANTI-COAG VISIT (OUTPATIENT)
Dept: CARDIOLOGY | Facility: CLINIC | Age: 73
End: 2018-06-12
Payer: MEDICARE

## 2018-06-12 DIAGNOSIS — Z79.01 LONG TERM CURRENT USE OF ANTICOAGULANT THERAPY: Primary | ICD-10-CM

## 2018-06-12 LAB — INR PPP: 2.1 (ref 2–3)

## 2018-06-12 PROCEDURE — 85610 PROTHROMBIN TIME: CPT | Mod: QW,S$GLB,, | Performed by: PHARMACIST

## 2018-06-12 NOTE — PROGRESS NOTES
INR in range, very stable.  Pt has been on Entresto for about a month, she reports she is feeling much better, breathing better, more active and eating better.  No changes.  Will maintain and recheck in 6 weeks

## 2018-06-14 DIAGNOSIS — Z78.0 ASYMPTOMATIC MENOPAUSAL STATE: ICD-10-CM

## 2018-06-14 DIAGNOSIS — E11.8 TYPE 2 DIABETES MELLITUS WITH COMPLICATION, WITHOUT LONG-TERM CURRENT USE OF INSULIN: Primary | ICD-10-CM

## 2018-06-19 ENCOUNTER — DOCUMENTATION ONLY (OUTPATIENT)
Dept: FAMILY MEDICINE | Facility: CLINIC | Age: 73
End: 2018-06-19

## 2018-06-19 ENCOUNTER — OFFICE VISIT (OUTPATIENT)
Dept: FAMILY MEDICINE | Facility: CLINIC | Age: 73
End: 2018-06-19
Payer: MEDICARE

## 2018-06-19 VITALS
HEIGHT: 65 IN | WEIGHT: 184.94 LBS | SYSTOLIC BLOOD PRESSURE: 105 MMHG | BODY MASS INDEX: 30.81 KG/M2 | DIASTOLIC BLOOD PRESSURE: 53 MMHG | HEART RATE: 93 BPM | TEMPERATURE: 98 F

## 2018-06-19 DIAGNOSIS — E78.5 HYPERLIPIDEMIA, UNSPECIFIED HYPERLIPIDEMIA TYPE: ICD-10-CM

## 2018-06-19 DIAGNOSIS — E11.8 TYPE 2 DIABETES MELLITUS WITH COMPLICATION, WITHOUT LONG-TERM CURRENT USE OF INSULIN: Primary | ICD-10-CM

## 2018-06-19 DIAGNOSIS — I70.0 ABDOMINAL AORTIC ATHEROSCLEROSIS: ICD-10-CM

## 2018-06-19 DIAGNOSIS — E66.9 OBESITY, CLASS I, BMI 30-34.9: ICD-10-CM

## 2018-06-19 DIAGNOSIS — I15.2 HYPERTENSION ASSOCIATED WITH DIABETES: ICD-10-CM

## 2018-06-19 DIAGNOSIS — E03.9 HYPOTHYROIDISM, UNSPECIFIED TYPE: ICD-10-CM

## 2018-06-19 DIAGNOSIS — E11.59 HYPERTENSION ASSOCIATED WITH DIABETES: ICD-10-CM

## 2018-06-19 PROCEDURE — 99999 PR PBB SHADOW E&M-EST. PATIENT-LVL III: CPT | Mod: PBBFAC,,, | Performed by: PHYSICIAN ASSISTANT

## 2018-06-19 PROCEDURE — 99499 UNLISTED E&M SERVICE: CPT | Mod: S$GLB,,, | Performed by: PHYSICIAN ASSISTANT

## 2018-06-19 PROCEDURE — 99214 OFFICE O/P EST MOD 30 MIN: CPT | Mod: S$GLB,,, | Performed by: PHYSICIAN ASSISTANT

## 2018-06-19 PROCEDURE — 3045F PR MOST RECENT HEMOGLOBIN A1C LEVEL 7.0-9.0%: CPT | Mod: CPTII,S$GLB,, | Performed by: PHYSICIAN ASSISTANT

## 2018-06-19 PROCEDURE — 3074F SYST BP LT 130 MM HG: CPT | Mod: CPTII,S$GLB,, | Performed by: PHYSICIAN ASSISTANT

## 2018-06-19 PROCEDURE — 3078F DIAST BP <80 MM HG: CPT | Mod: CPTII,S$GLB,, | Performed by: PHYSICIAN ASSISTANT

## 2018-06-19 RX ORDER — METOPROLOL SUCCINATE 100 MG/1
100 TABLET, EXTENDED RELEASE ORAL DAILY
COMMUNITY
End: 2018-08-16 | Stop reason: ALTCHOICE

## 2018-06-19 NOTE — PROGRESS NOTES
Pre-Visit Chart Review  For Appointment Scheduled on 06/19/18    Health Maintenance Due   Topic Date Due    TETANUS VACCINE  09/04/1963    DEXA SCAN  03/03/2017    Lipid Panel  12/02/2017    Hemoglobin A1c  12/19/2017    Eye Exam  04/05/2018    Pneumococcal (65+) (2 of 2 - PPSV23) 05/26/2018    Foot Exam  05/26/2018

## 2018-06-19 NOTE — PROGRESS NOTES
Subjective:       Patient ID: Eva Corona is a 72 y.o. female.    Chief Complaint: Annual Exam    HPI   Pateint is a 72 year old  female with DM II, HTN, HLD, Hypothyroidism, Cardiomyopathy presenting to the clinic for routine follow-up. She has not been seen by her PCP team >1 year. She is upset about having to come to this appointment to continue to get refills of her medications. She refuses bone density scan, mammogram. She reports she has already had both pneumonia vaccinations.   1) DM II- last check 9/2017- not at goal at 7.5; she reports compliance with metformin, glimeperide. She reports eye exam with Dr. De La Rosa within the past year. She agrees to get labs done. She is on statin therapy & ARB.   2) HTN- well controlled on current medications; she is established with cardiology whom she reports have just made adjustments to medications & now SOB is improved (on bumex vs. Lasix).  3) Hypothyroidism- last TSH 2/2016; she is overdue for this. She is still taking levothyroxine 75mcg daily.  Review of Systems   Constitutional: Negative for activity change, appetite change, chills, diaphoresis, fatigue and fever.   HENT: Negative for congestion, postnasal drip and rhinorrhea.    Respiratory: Negative.  Negative for cough, shortness of breath and wheezing.    Cardiovascular: Negative.  Negative for chest pain.   Gastrointestinal: Negative for abdominal pain, blood in stool, constipation, diarrhea, nausea and vomiting.   Genitourinary: Negative for dysuria, frequency, hematuria and urgency.   Musculoskeletal: Negative.    Skin: Negative.  Negative for color change and rash.   Neurological: Negative for dizziness and syncope.   Psychiatric/Behavioral: Negative for agitation, behavioral problems and confusion.       Objective:      Physical Exam   Constitutional: Vital signs are normal. She appears well-developed and well-nourished. No distress.   Cardiovascular: Normal rate, regular rhythm, S1 normal, S2  normal and normal heart sounds.  Exam reveals no gallop.    No murmur heard.  Pulses:       Radial pulses are 2+ on the right side, and 2+ on the left side.   Pulmonary/Chest: Effort normal and breath sounds normal. No respiratory distress. She has no wheezes. She has no rhonchi.   Feet:   Right Foot:   Protective Sensation: 7 sites tested. 7 sites sensed.   Skin Integrity: Negative for ulcer, blister, skin breakdown, erythema, warmth, callus or dry skin.   Left Foot:   Protective Sensation: 7 sites tested. 7 sites sensed.   Skin Integrity: Negative for ulcer, blister, skin breakdown, erythema, warmth, callus or dry skin.   Skin: Skin is warm and dry. She is not diaphoretic.   Psychiatric: She has a normal mood and affect. Her speech is normal and behavior is normal. Judgment and thought content normal. Cognition and memory are normal.       Assessment:       1. Type 2 diabetes mellitus with complication, without long-term current use of insulin    2. Hypertension associated with diabetes    3. Hypothyroidism, unspecified type    4. Hyperlipidemia, unspecified hyperlipidemia type    5. Abdominal aortic atherosclerosis    6. Obesity, Class I, BMI 30-34.9        Plan:   Eva was seen today for annual exam.    Diagnoses and all orders for this visit:    Type 2 diabetes mellitus with complication, without long-term current use of insulin  Not at goal; due for labs  Foot exam today  Requesting eye exam from Dr. De La Rosa   -     Hemoglobin A1c; Future  -     CBC auto differential; Future  -     Comprehensive metabolic panel; Future  -     Microalbumin/creatinine urine ratio; Future    Hypertension associated with diabetes  Well controlled; continue current BP medicaitons    Hypothyroidism, unspecified type  -     CBC auto differential; Future  -     TSH; Future    Hyperlipidemia, unspecified hyperlipidemia type  -     CBC auto differential; Future  -     Lipid panel; Future    Abdominal aortic atherosclerosis  Patient  currently on lipitor 10mg daily  She is on coumadin therapy for A. fib    Obesity, Class I, BMI 30-34.9  Patient readiness: acceptance and barriers:none    During the course of the visit the patient was educated and counseled about the following:     Diabetes:  Discussed general issues about diabetes pathophysiology and management.  Hypertension:   Medication: no change.  Obesity:   Regular aerobic exercise program discussed.    Goals: Diabetes: Maintain Hemoglobin A1C below 7, Hypertension: Reduce Blood Pressure and Obesity: Reduce calorie intake and BMI    Did patient meet goals/outcomes: No    The following self management tools provided: declined    Patient Instructions (the written plan) was given to the patient/family.     Time spent with patient: 30 minutes    Barriers to medications present (no )    Adverse reactions to current medications (no)    Over the counter medications reviewed (Yes)

## 2018-06-21 ENCOUNTER — PATIENT OUTREACH (OUTPATIENT)
Dept: ADMINISTRATIVE | Facility: HOSPITAL | Age: 73
End: 2018-06-21

## 2018-07-03 ENCOUNTER — LAB VISIT (OUTPATIENT)
Dept: LAB | Facility: HOSPITAL | Age: 73
End: 2018-07-03
Attending: PHYSICIAN ASSISTANT
Payer: MEDICARE

## 2018-07-03 DIAGNOSIS — E78.5 HYPERLIPIDEMIA, UNSPECIFIED HYPERLIPIDEMIA TYPE: ICD-10-CM

## 2018-07-03 DIAGNOSIS — E03.9 HYPOTHYROIDISM, UNSPECIFIED TYPE: ICD-10-CM

## 2018-07-03 DIAGNOSIS — E11.8 TYPE 2 DIABETES MELLITUS WITH COMPLICATION, WITHOUT LONG-TERM CURRENT USE OF INSULIN: ICD-10-CM

## 2018-07-03 LAB
ALBUMIN SERPL BCP-MCNC: 4.1 G/DL
ALP SERPL-CCNC: 69 U/L
ALT SERPL W/O P-5'-P-CCNC: 18 U/L
ANION GAP SERPL CALC-SCNC: 9 MMOL/L
AST SERPL-CCNC: 21 U/L
BASOPHILS # BLD AUTO: 0.04 K/UL
BASOPHILS NFR BLD: 0.7 %
BILIRUB SERPL-MCNC: 0.5 MG/DL
BUN SERPL-MCNC: 34 MG/DL
CALCIUM SERPL-MCNC: 10.1 MG/DL
CHLORIDE SERPL-SCNC: 100 MMOL/L
CHOLEST SERPL-MCNC: 139 MG/DL
CHOLEST/HDLC SERPL: 4.8 {RATIO}
CO2 SERPL-SCNC: 31 MMOL/L
CREAT SERPL-MCNC: 1.1 MG/DL
DIFFERENTIAL METHOD: ABNORMAL
EOSINOPHIL # BLD AUTO: 0.4 K/UL
EOSINOPHIL NFR BLD: 6.3 %
ERYTHROCYTE [DISTWIDTH] IN BLOOD BY AUTOMATED COUNT: 18.1 %
EST. GFR  (AFRICAN AMERICAN): 58 ML/MIN/1.73 M^2
EST. GFR  (NON AFRICAN AMERICAN): 50.3 ML/MIN/1.73 M^2
ESTIMATED AVG GLUCOSE: 154 MG/DL
GLUCOSE SERPL-MCNC: 95 MG/DL
HBA1C MFR BLD HPLC: 7 %
HCT VFR BLD AUTO: 36.5 %
HDLC SERPL-MCNC: 29 MG/DL
HDLC SERPL: 20.9 %
HGB BLD-MCNC: 11.4 G/DL
IMM GRANULOCYTES # BLD AUTO: 0.01 K/UL
IMM GRANULOCYTES NFR BLD AUTO: 0.2 %
LDLC SERPL CALC-MCNC: 71 MG/DL
LYMPHOCYTES # BLD AUTO: 2.2 K/UL
LYMPHOCYTES NFR BLD: 36 %
MCH RBC QN AUTO: 28 PG
MCHC RBC AUTO-ENTMCNC: 31.2 G/DL
MCV RBC AUTO: 90 FL
MONOCYTES # BLD AUTO: 0.6 K/UL
MONOCYTES NFR BLD: 9.7 %
NEUTROPHILS # BLD AUTO: 2.9 K/UL
NEUTROPHILS NFR BLD: 47.1 %
NONHDLC SERPL-MCNC: 110 MG/DL
NRBC BLD-RTO: 0 /100 WBC
PLATELET # BLD AUTO: 256 K/UL
PMV BLD AUTO: 10.4 FL
POTASSIUM SERPL-SCNC: 5.1 MMOL/L
PROT SERPL-MCNC: 7.8 G/DL
RBC # BLD AUTO: 4.07 M/UL
SODIUM SERPL-SCNC: 140 MMOL/L
TRIGL SERPL-MCNC: 195 MG/DL
TSH SERPL DL<=0.005 MIU/L-ACNC: 2.7 UIU/ML
WBC # BLD AUTO: 6.06 K/UL

## 2018-07-03 PROCEDURE — 83036 HEMOGLOBIN GLYCOSYLATED A1C: CPT

## 2018-07-03 PROCEDURE — 36415 COLL VENOUS BLD VENIPUNCTURE: CPT | Mod: PO

## 2018-07-03 PROCEDURE — 80061 LIPID PANEL: CPT

## 2018-07-03 PROCEDURE — 85025 COMPLETE CBC W/AUTO DIFF WBC: CPT

## 2018-07-03 PROCEDURE — 80053 COMPREHEN METABOLIC PANEL: CPT

## 2018-07-03 PROCEDURE — 84443 ASSAY THYROID STIM HORMONE: CPT

## 2018-07-09 ENCOUNTER — PATIENT OUTREACH (OUTPATIENT)
Dept: ADMINISTRATIVE | Facility: HOSPITAL | Age: 73
End: 2018-07-09

## 2018-07-12 RX ORDER — METFORMIN HYDROCHLORIDE 500 MG/1
TABLET ORAL
Qty: 120 TABLET | Refills: 3 | Status: SHIPPED | OUTPATIENT
Start: 2018-07-12 | End: 2018-11-12 | Stop reason: SDUPTHER

## 2018-07-24 ENCOUNTER — ANTI-COAG VISIT (OUTPATIENT)
Dept: CARDIOLOGY | Facility: CLINIC | Age: 73
End: 2018-07-24
Payer: MEDICARE

## 2018-07-24 DIAGNOSIS — Z79.01 LONG TERM CURRENT USE OF ANTICOAGULANT THERAPY: Primary | ICD-10-CM

## 2018-07-24 LAB — INR PPP: 2.7 (ref 2–3)

## 2018-07-24 PROCEDURE — 85610 PROTHROMBIN TIME: CPT | Mod: PBBFAC,PO | Performed by: PHARMACIST

## 2018-08-14 ENCOUNTER — HOSPITAL ENCOUNTER (OUTPATIENT)
Dept: CARDIOLOGY | Facility: HOSPITAL | Age: 73
Discharge: HOME | End: 2018-08-14
Attending: INTERNAL MEDICINE
Payer: MEDICARE

## 2018-08-14 VITALS
SYSTOLIC BLOOD PRESSURE: 130 MMHG | DIASTOLIC BLOOD PRESSURE: 55 MMHG | HEIGHT: 65 IN | WEIGHT: 184 LBS | BODY MASS INDEX: 30.66 KG/M2 | HEART RATE: 92 BPM

## 2018-08-14 DIAGNOSIS — I42.9 CARDIOMYOPATHY, UNSPECIFIED TYPE: ICD-10-CM

## 2018-08-14 PROCEDURE — 93306 TTE W/DOPPLER COMPLETE: CPT

## 2018-08-14 PROCEDURE — 93306 TTE W/DOPPLER COMPLETE: CPT | Mod: 26,,, | Performed by: INTERNAL MEDICINE

## 2018-08-15 LAB
AORTIC ROOT ANNULUS: 3.34 CM
AORTIC VALVE CUSP SEPERATION: 2.18 CM
AV MEAN GRADIENT: 3.7 MMHG
AV PEAK GRADIENT: 5.6 MMHG
AV VALVE AREA: 2.76 CM2
BSA FOR ECHO PROCEDURE: 1.96 M2
CV ECHO LV RWT: 0.47 CM
DOP CALC AO PEAK VEL: 1.18 M/S
DOP CALC AO VTI: 26.68 CM
DOP CALC LVOT AREA: 3.9 CM2
DOP CALC LVOT DIAMETER: 2.23 CM
DOP CALC LVOT STROKE VOLUME: 73.51 CM3
DOP CALCLVOT PEAK VEL VTI: 18.83 CM
E WAVE DECELERATION TIME: 175.51 MSEC
E/A RATIO: 1.5
E/E' RATIO: 14
ECHO LV POSTERIOR WALL: 1.24 CM (ref 0.6–1.1)
FRACTIONAL SHORTENING: 19 % (ref 28–44)
INTERVENTRICULAR SEPTUM: 1.24 CM (ref 0.6–1.1)
IVRT: 0.04 MSEC
LEFT ATRIUM SIZE: 4.41 CM
LEFT INTERNAL DIMENSION IN SYSTOLE: 4.33 CM (ref 2.1–4)
LEFT VENTRICLE MASS INDEX: 137.8 G/M2
LEFT VENTRICULAR INTERNAL DIMENSION IN DIASTOLE: 5.32 CM (ref 3.5–6)
LEFT VENTRICULAR MASS: 270.15 G
LV LATERAL E/E' RATIO: 10.5
LV SEPTAL E/E' RATIO: 21
MV PEAK A VEL: 0.56 M/S
MV PEAK E VEL: 0.84 M/S
MV STENOSIS PRESSURE HALF TIME: 50.9 MS
MV VALVE AREA P 1/2 METHOD: 4.32 CM2
PISA TR MAX VEL: 2.38 M/S
PV PEAK GRADIENT: 1.99 MMHG
PV PEAK VELOCITY: 0.71 CM/S
RIGHT VENTRICULAR END-DIASTOLIC DIMENSION: 3.54 CM
TDI LATERAL: 0.08
TDI SEPTAL: 0.04
TDI: 0.06
TR MAX PG: 22.66 MMHG
TRICUSPID ANNULAR PLANE SYSTOLIC EXCURSION: 0.01 CM

## 2018-08-16 ENCOUNTER — OFFICE VISIT (OUTPATIENT)
Dept: CARDIOLOGY | Facility: CLINIC | Age: 73
End: 2018-08-16
Payer: MEDICARE

## 2018-08-16 VITALS
HEART RATE: 94 BPM | OXYGEN SATURATION: 90 % | DIASTOLIC BLOOD PRESSURE: 58 MMHG | HEIGHT: 65 IN | WEIGHT: 188.94 LBS | BODY MASS INDEX: 31.48 KG/M2 | SYSTOLIC BLOOD PRESSURE: 134 MMHG

## 2018-08-16 DIAGNOSIS — I42.8 OTHER CARDIOMYOPATHY: Primary | ICD-10-CM

## 2018-08-16 DIAGNOSIS — I15.2 HYPERTENSION ASSOCIATED WITH DIABETES: ICD-10-CM

## 2018-08-16 DIAGNOSIS — E66.9 OBESITY, CLASS I, BMI 30-34.9: ICD-10-CM

## 2018-08-16 DIAGNOSIS — Z79.01 LONG TERM CURRENT USE OF ANTICOAGULANT THERAPY: ICD-10-CM

## 2018-08-16 DIAGNOSIS — I48.19 PERSISTENT ATRIAL FIBRILLATION: ICD-10-CM

## 2018-08-16 DIAGNOSIS — I10 HYPERTENSION, UNSPECIFIED TYPE: ICD-10-CM

## 2018-08-16 DIAGNOSIS — E11.59 HYPERTENSION ASSOCIATED WITH DIABETES: ICD-10-CM

## 2018-08-16 PROCEDURE — 99999 PR PBB SHADOW E&M-EST. PATIENT-LVL III: CPT | Mod: PBBFAC,,, | Performed by: INTERNAL MEDICINE

## 2018-08-16 PROCEDURE — 93000 ELECTROCARDIOGRAM COMPLETE: CPT | Mod: S$GLB,,, | Performed by: INTERNAL MEDICINE

## 2018-08-16 PROCEDURE — 3045F PR MOST RECENT HEMOGLOBIN A1C LEVEL 7.0-9.0%: CPT | Mod: CPTII,S$GLB,, | Performed by: INTERNAL MEDICINE

## 2018-08-16 PROCEDURE — 3078F DIAST BP <80 MM HG: CPT | Mod: CPTII,S$GLB,, | Performed by: INTERNAL MEDICINE

## 2018-08-16 PROCEDURE — 99214 OFFICE O/P EST MOD 30 MIN: CPT | Mod: S$GLB,,, | Performed by: INTERNAL MEDICINE

## 2018-08-16 PROCEDURE — 3075F SYST BP GE 130 - 139MM HG: CPT | Mod: CPTII,S$GLB,, | Performed by: INTERNAL MEDICINE

## 2018-08-16 RX ORDER — METOPROLOL SUCCINATE 50 MG/1
50 TABLET, EXTENDED RELEASE ORAL DAILY
Qty: 90 TABLET | Refills: 11 | Status: SHIPPED | OUTPATIENT
Start: 2018-08-16 | End: 2019-04-29

## 2018-08-17 NOTE — PROGRESS NOTES
[facility]    CC:  Follow-up appointment  Chief Complaint   Patient presents with    Follow-up       Patient ID: Eva Corona is a 72 y.o. female with a past medical history of heart failure with reduced ejection fraction, atrial fibrillation, diabetes  HPI  Patient is feeling better.  Her echocardiography as noted below shows improvement in her ejection fraction.  She denies any orthopnea PND.  She is currently in NYHA class 1 heart failure    Past Medical History:   Diagnosis Date    Anticoagulant long-term use     Atrial fibrillation     Cataract     CHF (congestive heart failure)     Diabetes mellitus     Encounter for blood transfusion     Hyperlipidemia     Hypertension     Hypothyroidism     On home oxygen therapy     2L, nightly    S/P ablation of atrial fibrillation 7/22/2015    Shortness of breath on exertion     Thyroid disease     Type 2 diabetes mellitus      Past Surgical History:   Procedure Laterality Date    CARDIAC CATHETERIZATION Right     CARDIAC ELECTROPHYSIOLOGY MAPPING AND ABLATION      COLONOSCOPY      EYE SURGERY Bilateral     cataract, implants    HYSTERECTOMY      TVH, ovaries intact, benign     Social History     Socioeconomic History    Marital status:      Spouse name: Not on file    Number of children: Not on file    Years of education: Not on file    Highest education level: Not on file   Social Needs    Financial resource strain: Not on file    Food insecurity - worry: Not on file    Food insecurity - inability: Not on file    Transportation needs - medical: Not on file    Transportation needs - non-medical: Not on file   Occupational History    Not on file   Tobacco Use    Smoking status: Never Smoker    Smokeless tobacco: Never Used   Substance and Sexual Activity    Alcohol use: No     Alcohol/week: 0.0 oz    Drug use: No    Sexual activity: No   Other Topics Concern    Not on file   Social History Narrative    Not on  file     Family History   Problem Relation Age of Onset    Diabetes Mother     Heart disease Sister     No Known Problems Brother     No Known Problems Daughter     No Known Problems Son     No Known Problems Brother     No Known Problems Brother     No Known Problems Son     Cancer Neg Hx        Review of patient's allergies indicates:   Allergen Reactions    Codeine Nausea And Vomiting    Neuromuscular blockers, steroidal      Other reaction(s): Unknown    Morphine Nausea And Vomiting       Medication List with Changes/Refills   New Medications    METOPROLOL SUCCINATE (TOPROL-XL) 50 MG 24 HR TABLET    Take 1 tablet (50 mg total) by mouth once daily.   Current Medications    ACETAMINOPHEN (TYLENOL) 325 MG TABLET    Take 2 tablets (650 mg total) by mouth every 6 (six) hours as needed for Pain.    ASCORBIC ACID (VITAMIN C) 1000 MG TABLET    Take 1,000 mg by mouth once daily.    ATORVASTATIN (LIPITOR) 10 MG TABLET    TAKE 1 TABLET EVERY DAY    BUMETANIDE (BUMEX) 0.5 MG TAB    Take 1 tablet (0.5 mg total) by mouth once daily.    BUMETANIDE (BUMEX) 1 MG TABLET    Take 1 tablet (1 mg total) by mouth once daily.    CARVEDILOL (COREG) 6.25 MG TABLET    Take 1 tablet (6.25 mg total) by mouth 2 (two) times daily with meals.    COCONUT OIL ORAL    Take by mouth.    FLUTICASONE (FLONASE) 50 MCG/ACTUATION NASAL SPRAY    USE 2 SPRAYS IN EACH NOSTRIL EVERY EVENING    GLIMEPIRIDE (AMARYL) 4 MG TABLET    TAKE 2 TABLETS EVERY DAY WITH BREAKFAST    LANSOPRAZOLE (PREVACID SOLUTAB) 30 MG DISINTEGRATING TABLET    Take 30 mg by mouth once daily.    LEVOTHYROXINE (SYNTHROID) 75 MCG TABLET    TAKE 1 TABLET EVERY DAY  BEFORE  BREAKFAST    METFORMIN (GLUCOPHAGE) 500 MG TABLET    TAKE TWO TABLETS BY MOUTH TWO TIMES A DAY WITH MEALS    MULTIVIT-MIN/FA/CA CARB/VIT K (ONE-A-DAY WOMEN'S 50+ ORAL)    Take 1 tablet by mouth once daily.     POTASSIUM CHLORIDE SA (K-DUR,KLOR-CON) 20 MEQ TABLET    Take 1 tablet (20 mEq total) by mouth once  "daily.    SACUBITRIL-VALSARTAN (ENTRESTO)  MG PER TABLET    Take 1 tablet by mouth 2 (two) times daily.    SPIRONOLACTONE (ALDACTONE) 25 MG TABLET    Take 1 tablet (25 mg total) by mouth once daily.    VITAMIN A ORAL    Take 1 tablet by mouth once daily.     WARFARIN (COUMADIN) 5 MG TABLET    TAKE 1 AND 1/2 TABLETS (7.5 MG)  ON MON, WED, FRI. AND TAKE 1 TABLET (5 MG) ALL OTHER DAYS.   Discontinued Medications    METOPROLOL SUCCINATE (TOPROL-XL) 100 MG 24 HR TABLET    Take 1 tablet (100 mg total) by mouth once daily.    METOPROLOL SUCCINATE (TOPROL-XL) 100 MG 24 HR TABLET    Take 100 mg by mouth once daily. Take 1/2 dialy       Review of Systems  Constitution: Denies chills, fever, and sweats.  HENT: Denies headaches or blurry vision.  Cardiovascular: Denies chest pain or irregular heart beat.  Respiratory: Denies cough or shortness of breath.  Gastrointestinal: Denies abdominal pain, nausea, or vomiting.  Musculoskeletal: Denies muscle cramps.  Neurological: Denies dizziness or focal weakness.  Psychiatric/Behavioral: Normal mental status.  Hematologic/Lymphatic: Denies bleeding problem or easy bruising/bleeding.  Skin: Denies rash or suspicious lesions    Physical Examination  BP (!) 134/58 (BP Location: Right arm, Patient Position: Sitting)   Pulse 94   Ht 5' 5" (1.651 m)   Wt 85.7 kg (188 lb 15 oz)   LMP  (LMP Unknown)   SpO2 (!) 90%   BMI 31.44 kg/m²     Constitutional: No acute distress, conversant  HEENT: Sclera anicteric, Pupils equal, round and reactive to light, extraocular motions intact, Oropharynx clear  Neck: No JVD, no carotid bruits  Cardiovascular: regular rate and rhythm, no murmur, rubs or gallops, normal S1/S2  Pulmonary: Clear to auscultation bilaterally  Abdominal: Abdomen soft, nontender, nondistended, positive bowel sounds  Extremities: No lower extremity edema,   Pulses:  Carotid pulses are 2+ on the right side, and 2+ on the left side.  Radial pulses are 2+ on the right side, and " 2+ on the left side.      Skin: No ecchymosis, erythema, or ulcers  Psych: Alert and oriented x 3, appropriate affect  Neuro: CNII-XII intact, no focal deficits    Labs:  Most Recent Data  CBC:   Lab Results   Component Value Date    WBC 6.06 07/03/2018    HGB 11.4 (L) 07/03/2018    HCT 36.5 (L) 07/03/2018     07/03/2018    MCV 90 07/03/2018    RDW 18.1 (H) 07/03/2018     BMP:   Lab Results   Component Value Date     07/03/2018    K 5.1 07/03/2018     07/03/2018    CO2 31 (H) 07/03/2018    BUN 34 (H) 07/03/2018    CREATININE 1.1 07/03/2018    GLU 95 07/03/2018    CALCIUM 10.1 07/03/2018    MG 1.7 11/14/2017    PHOS 3.7 03/31/2015     LFTS;   Lab Results   Component Value Date    PROT 7.8 07/03/2018    ALBUMIN 4.1 07/03/2018    BILITOT 0.5 07/03/2018    AST 21 07/03/2018    ALKPHOS 69 07/03/2018    ALT 18 07/03/2018     COAGS:   Lab Results   Component Value Date    INR 2.7 07/24/2018     FLP:   Lab Results   Component Value Date    CHOL 139 07/03/2018    HDL 29 (L) 07/03/2018    LDLCALC 71.0 07/03/2018    TRIG 195 (H) 07/03/2018    CHOLHDL 20.9 07/03/2018     CARDIAC:   Lab Results   Component Value Date    TROPONINI 0.010 10/20/2017     (H) 11/14/2017       Imaging:    EKG:  Atrial fibrillation with controlled ventricular response    Echo:  Conclusion     · Left ventricle ejection fraction is moderately decreased at 30%  · Grade I (mild) left ventricular diastolic dysfunction consistent with impaired relaxation.  · LA pressure is normal.  · Mitral valve shows mild regurgitation.  · Left ventricle shows mild concentric hypertrophy.  · RV systolic function is normal.  · Left atrium is moderately dilated.  · Right atrium is moderately dilated.  · Tricuspid valve shows mild regurgitation.            I have personally reviewed these images and echo data    Assessment/Plan:  Eva was seen today for follow-up.    Diagnoses and all orders for this visit:    Other cardiomyopathy    Hypertension,  unspecified type  -     EKG 12-lead    Persistent atrial fibrillation    Hypertension associated with diabetes    Long term current use of anticoagulant therapy    Obesity, Class I, BMI 30-34.9    Other orders  -     metoprolol succinate (TOPROL-XL) 50 MG 24 hr tablet; Take 1 tablet (50 mg total) by mouth once daily.       patient is currently stable from a cardiovascular perspective.  Her ejection fraction remains low but is significantly improved from her prior ejection fraction.  She remains on anticoagulation given her persistent atrial fibrillation and risk of stroke.  She in addition she remains on a beta-blocker and an aldosterone inhibitor.      Follow-up in about 6 months (around 2/16/2019).          Total duration of face to face visit time 30 minutes.  Total time spent counseling greater than fifty percent of total visit time.  Counseling included discussion regarding imaging findings, diagnosis, possibilities, treatment options, risks and benefits.  The patient had many questions regarding the options and long-term effect which were all answered to my best ability.      Josef London MD,MRCP,RPVI,FACC,FSCAI.  Interventional Cardiology   Phone [phone]

## 2018-08-27 RX ORDER — WARFARIN SODIUM 5 MG/1
TABLET ORAL
Qty: 110 TABLET | Refills: 0 | Status: SHIPPED | OUTPATIENT
Start: 2018-08-27 | End: 2018-10-29 | Stop reason: SDUPTHER

## 2018-08-28 RX ORDER — FLUTICASONE PROPIONATE 50 MCG
SPRAY, SUSPENSION (ML) NASAL
Qty: 48 G | Refills: 3 | Status: SHIPPED | OUTPATIENT
Start: 2018-08-28 | End: 2019-07-01 | Stop reason: SDUPTHER

## 2018-09-04 ENCOUNTER — TELEPHONE (OUTPATIENT)
Dept: FAMILY MEDICINE | Facility: CLINIC | Age: 73
End: 2018-09-04

## 2018-09-04 NOTE — TELEPHONE ENCOUNTER
Spoke with patient and told her the soonest available appointment was not until 9/6. Patient declined.

## 2018-09-04 NOTE — TELEPHONE ENCOUNTER
----- Message from Teresa Coates sent at 9/4/2018 11:56 AM CDT -----  Contact: PT  PT is suffering from bad sinus and needs a refill on: doxycyline   PT said she can't take the steroids because of her heart      Callback: [phone]

## 2018-09-18 ENCOUNTER — ANTI-COAG VISIT (OUTPATIENT)
Dept: CARDIOLOGY | Facility: CLINIC | Age: 73
End: 2018-09-18
Payer: MEDICARE

## 2018-09-18 DIAGNOSIS — Z79.01 LONG TERM CURRENT USE OF ANTICOAGULANT THERAPY: Primary | ICD-10-CM

## 2018-09-18 LAB — INR PPP: 3.1 (ref 2–3)

## 2018-09-18 PROCEDURE — 85610 PROTHROMBIN TIME: CPT | Mod: PBBFAC,PO | Performed by: PHARMACIST

## 2018-09-18 PROCEDURE — 99211 OFF/OP EST MAY X REQ PHY/QHP: CPT | Mod: S$PBB,25,, | Performed by: PHARMACIST

## 2018-09-18 NOTE — PROGRESS NOTES
INr slightly over range.  Pt reports she went to ER thurs and placed on Doxy 100mg BID x 7 days.  CC not notified.  No DDI really evident.  She plans to refill it, since not resolved. (MD gave 1 refill).  She has been ill over over a week.  Pt encouraged to call should therapy need to replace the doxy.  I am lowering dose slightly of the next 7 days in even there is still some delayed DDI to occur. Will recheck in a month

## 2018-10-09 ENCOUNTER — PES CALL (OUTPATIENT)
Dept: ADMINISTRATIVE | Facility: CLINIC | Age: 73
End: 2018-10-09

## 2018-10-16 ENCOUNTER — ANTI-COAG VISIT (OUTPATIENT)
Dept: CARDIOLOGY | Facility: CLINIC | Age: 73
End: 2018-10-16
Payer: MEDICARE

## 2018-10-16 DIAGNOSIS — Z79.01 LONG TERM CURRENT USE OF ANTICOAGULANT THERAPY: Primary | ICD-10-CM

## 2018-10-16 LAB — INR PPP: 4.1 (ref 2–3)

## 2018-10-16 PROCEDURE — 85610 PROTHROMBIN TIME: CPT | Mod: PBBFAC,PO | Performed by: PHARMACIST

## 2018-10-16 PROCEDURE — 99211 OFF/OP EST MAY X REQ PHY/QHP: CPT | Mod: S$PBB,25,, | Performed by: PHARMACIST

## 2018-10-30 ENCOUNTER — LAB VISIT (OUTPATIENT)
Dept: LAB | Facility: HOSPITAL | Age: 73
End: 2018-10-30
Attending: FAMILY MEDICINE
Payer: MEDICARE

## 2018-10-30 DIAGNOSIS — Z79.01 LONG TERM CURRENT USE OF ANTICOAGULANT THERAPY: ICD-10-CM

## 2018-10-30 DIAGNOSIS — I15.9 SECONDARY HYPERTENSION: ICD-10-CM

## 2018-10-30 LAB
INR PPP: 3.5
PROTHROMBIN TIME: 35.7 SEC

## 2018-10-30 PROCEDURE — 85610 PROTHROMBIN TIME: CPT | Mod: HCWC

## 2018-10-30 PROCEDURE — 36415 COLL VENOUS BLD VENIPUNCTURE: CPT | Mod: HCWC

## 2018-10-30 RX ORDER — WARFARIN SODIUM 5 MG/1
TABLET ORAL
Qty: 110 TABLET | Refills: 11 | Status: SHIPPED | OUTPATIENT
Start: 2018-10-30 | End: 2019-11-19 | Stop reason: ALTCHOICE

## 2018-11-12 RX ORDER — METFORMIN HYDROCHLORIDE 500 MG/1
TABLET ORAL
Qty: 120 TABLET | Refills: 0 | Status: SHIPPED | OUTPATIENT
Start: 2018-11-12 | End: 2018-12-12 | Stop reason: SDUPTHER

## 2018-11-15 ENCOUNTER — LAB VISIT (OUTPATIENT)
Dept: LAB | Facility: HOSPITAL | Age: 73
End: 2018-11-15
Attending: FAMILY MEDICINE
Payer: MEDICARE

## 2018-11-15 DIAGNOSIS — I15.9 SECONDARY HYPERTENSION: ICD-10-CM

## 2018-11-15 DIAGNOSIS — Z79.01 LONG TERM CURRENT USE OF ANTICOAGULANT THERAPY: ICD-10-CM

## 2018-11-15 LAB
INR PPP: 2.3
PROTHROMBIN TIME: 22.9 SEC

## 2018-11-15 PROCEDURE — 85610 PROTHROMBIN TIME: CPT | Mod: HCWC

## 2018-11-15 PROCEDURE — 36415 COLL VENOUS BLD VENIPUNCTURE: CPT | Mod: HCWC

## 2018-12-12 RX ORDER — METFORMIN HYDROCHLORIDE 500 MG/1
TABLET ORAL
Qty: 120 TABLET | Refills: 0 | Status: SHIPPED | OUTPATIENT
Start: 2018-12-12 | End: 2019-01-14 | Stop reason: SDUPTHER

## 2018-12-12 RX ORDER — SPIRONOLACTONE 25 MG/1
TABLET ORAL
Qty: 30 TABLET | Refills: 11 | Status: SHIPPED | OUTPATIENT
Start: 2018-12-12 | End: 2019-12-06 | Stop reason: SDUPTHER

## 2018-12-13 ENCOUNTER — LAB VISIT (OUTPATIENT)
Dept: LAB | Facility: HOSPITAL | Age: 73
End: 2018-12-13
Attending: FAMILY MEDICINE
Payer: MEDICARE

## 2018-12-13 DIAGNOSIS — I15.9 SECONDARY HYPERTENSION: ICD-10-CM

## 2018-12-13 DIAGNOSIS — Z79.01 LONG TERM CURRENT USE OF ANTICOAGULANT THERAPY: ICD-10-CM

## 2018-12-13 LAB
INR PPP: 2.5
PROTHROMBIN TIME: 25.5 SEC

## 2018-12-13 PROCEDURE — 36415 COLL VENOUS BLD VENIPUNCTURE: CPT

## 2018-12-13 PROCEDURE — 85610 PROTHROMBIN TIME: CPT

## 2018-12-17 ENCOUNTER — PES CALL (OUTPATIENT)
Dept: ADMINISTRATIVE | Facility: CLINIC | Age: 73
End: 2018-12-17

## 2019-01-07 RX ORDER — LEVOTHYROXINE SODIUM 75 UG/1
TABLET ORAL
Qty: 90 TABLET | Refills: 0 | Status: SHIPPED | OUTPATIENT
Start: 2019-01-07 | End: 2019-03-11 | Stop reason: SDUPTHER

## 2019-01-07 RX ORDER — AMLODIPINE BESYLATE 5 MG/1
TABLET ORAL
Qty: 90 TABLET | Refills: 0 | Status: SHIPPED | OUTPATIENT
Start: 2019-01-07 | End: 2019-03-11 | Stop reason: SDUPTHER

## 2019-01-07 RX ORDER — GLIMEPIRIDE 4 MG/1
TABLET ORAL
Qty: 180 TABLET | Refills: 0 | Status: SHIPPED | OUTPATIENT
Start: 2019-01-07 | End: 2019-03-11 | Stop reason: SDUPTHER

## 2019-01-07 RX ORDER — ATORVASTATIN CALCIUM 10 MG/1
TABLET, FILM COATED ORAL
Qty: 90 TABLET | Refills: 0 | Status: SHIPPED | OUTPATIENT
Start: 2019-01-07 | End: 2019-03-11 | Stop reason: SDUPTHER

## 2019-01-10 ENCOUNTER — LAB VISIT (OUTPATIENT)
Dept: LAB | Facility: HOSPITAL | Age: 74
End: 2019-01-10
Attending: FAMILY MEDICINE
Payer: MEDICARE

## 2019-01-10 DIAGNOSIS — Z79.01 LONG TERM CURRENT USE OF ANTICOAGULANT THERAPY: ICD-10-CM

## 2019-01-10 DIAGNOSIS — I15.9 SECONDARY HYPERTENSION: ICD-10-CM

## 2019-01-10 LAB
INR PPP: 2
PROTHROMBIN TIME: 20.8 SEC

## 2019-01-10 PROCEDURE — 85610 PROTHROMBIN TIME: CPT

## 2019-01-10 PROCEDURE — 36415 COLL VENOUS BLD VENIPUNCTURE: CPT

## 2019-01-14 ENCOUNTER — TELEPHONE (OUTPATIENT)
Dept: FAMILY MEDICINE | Facility: CLINIC | Age: 74
End: 2019-01-14

## 2019-01-14 RX ORDER — METFORMIN HYDROCHLORIDE 500 MG/1
TABLET ORAL
Qty: 120 TABLET | Refills: 0 | Status: SHIPPED | OUTPATIENT
Start: 2019-01-14 | End: 2019-02-13 | Stop reason: SDUPTHER

## 2019-01-14 NOTE — TELEPHONE ENCOUNTER
Called pt. Need to reschedule appt with Dr. Peterson on 1/28/19. Dr. Peterson is not in clinic 1/14-2/11. Rescheduled to 2/13/19 with Dr. Peterson. Thanks, Chitra

## 2019-01-29 ENCOUNTER — DOCUMENTATION ONLY (OUTPATIENT)
Dept: FAMILY MEDICINE | Facility: CLINIC | Age: 74
End: 2019-01-29

## 2019-01-29 NOTE — PROGRESS NOTES
Pre-Visit Chart Review  For Appointment Scheduled on 2/13/19.    Health Maintenance Due   Topic Date Due    TETANUS VACCINE  09/04/1963    Eye Exam  05/10/2018    Pneumococcal Vaccine (65+ Low/Medium Risk) (2 of 2 - PPSV23) 05/26/2018    Influenza Vaccine  08/01/2018    Hemoglobin A1c  11/16/2018

## 2019-01-30 ENCOUNTER — PATIENT OUTREACH (OUTPATIENT)
Dept: ADMINISTRATIVE | Facility: HOSPITAL | Age: 74
End: 2019-01-30

## 2019-01-30 DIAGNOSIS — Z12.39 SCREENING FOR BREAST CANCER: ICD-10-CM

## 2019-01-30 DIAGNOSIS — E11.8 TYPE 2 DIABETES MELLITUS WITH COMPLICATION, WITHOUT LONG-TERM CURRENT USE OF INSULIN: Primary | ICD-10-CM

## 2019-02-13 ENCOUNTER — TELEPHONE (OUTPATIENT)
Dept: FAMILY MEDICINE | Facility: CLINIC | Age: 74
End: 2019-02-13

## 2019-02-13 RX ORDER — METFORMIN HYDROCHLORIDE 500 MG/1
TABLET ORAL
Qty: 120 TABLET | Refills: 0 | Status: SHIPPED | OUTPATIENT
Start: 2019-02-13 | End: 2019-03-18 | Stop reason: SDUPTHER

## 2019-02-13 NOTE — TELEPHONE ENCOUNTER
Left another message on machine for pt to call back. We need to reschedule appt with Dr. Peterson on 2/13/19. Dr. Peterson is not in clinic this afternoon. Thanks, Chitra

## 2019-02-21 ENCOUNTER — LAB VISIT (OUTPATIENT)
Dept: LAB | Facility: HOSPITAL | Age: 74
End: 2019-02-21
Attending: FAMILY MEDICINE
Payer: MEDICARE

## 2019-02-21 DIAGNOSIS — I15.9 SECONDARY HYPERTENSION: ICD-10-CM

## 2019-02-21 DIAGNOSIS — Z79.01 LONG TERM CURRENT USE OF ANTICOAGULANT THERAPY: ICD-10-CM

## 2019-02-21 LAB
INR PPP: 2.2
PROTHROMBIN TIME: 22.1 SEC

## 2019-02-21 PROCEDURE — 36415 COLL VENOUS BLD VENIPUNCTURE: CPT | Mod: HCNC

## 2019-02-21 PROCEDURE — 85610 PROTHROMBIN TIME: CPT | Mod: HCNC

## 2019-02-27 ENCOUNTER — DOCUMENTATION ONLY (OUTPATIENT)
Dept: FAMILY MEDICINE | Facility: CLINIC | Age: 74
End: 2019-02-27

## 2019-02-27 NOTE — PROGRESS NOTES
Pre-Visit Chart Review  For Appointment Scheduled on 02/28/19    Health Maintenance Due   Topic Date Due    TETANUS VACCINE  09/04/1963    Mammogram  03/03/2016    DEXA SCAN  03/03/2017    Eye Exam  05/10/2018    Pneumococcal Vaccine (65+ Low/Medium Risk) (2 of 2 - PPSV23) 05/26/2018    Influenza Vaccine  08/01/2018    Hemoglobin A1c  11/16/2018

## 2019-02-28 ENCOUNTER — OFFICE VISIT (OUTPATIENT)
Dept: FAMILY MEDICINE | Facility: CLINIC | Age: 74
End: 2019-02-28
Payer: MEDICARE

## 2019-02-28 ENCOUNTER — LAB VISIT (OUTPATIENT)
Dept: LAB | Facility: HOSPITAL | Age: 74
End: 2019-02-28
Attending: PHYSICIAN ASSISTANT
Payer: MEDICARE

## 2019-02-28 VITALS
DIASTOLIC BLOOD PRESSURE: 65 MMHG | HEART RATE: 93 BPM | TEMPERATURE: 98 F | HEIGHT: 65 IN | BODY MASS INDEX: 33.38 KG/M2 | SYSTOLIC BLOOD PRESSURE: 128 MMHG | WEIGHT: 200.38 LBS

## 2019-02-28 DIAGNOSIS — I70.0 ABDOMINAL AORTIC ATHEROSCLEROSIS: ICD-10-CM

## 2019-02-28 DIAGNOSIS — E11.69 HYPERLIPIDEMIA ASSOCIATED WITH TYPE 2 DIABETES MELLITUS: ICD-10-CM

## 2019-02-28 DIAGNOSIS — E78.5 HYPERLIPIDEMIA ASSOCIATED WITH TYPE 2 DIABETES MELLITUS: ICD-10-CM

## 2019-02-28 DIAGNOSIS — E11.59 HYPERTENSION ASSOCIATED WITH DIABETES: ICD-10-CM

## 2019-02-28 DIAGNOSIS — E11.8 TYPE 2 DIABETES MELLITUS WITH COMPLICATION, WITHOUT LONG-TERM CURRENT USE OF INSULIN: ICD-10-CM

## 2019-02-28 DIAGNOSIS — I50.33 ACUTE ON CHRONIC DIASTOLIC HEART FAILURE: ICD-10-CM

## 2019-02-28 DIAGNOSIS — I48.19 PERSISTENT ATRIAL FIBRILLATION: ICD-10-CM

## 2019-02-28 DIAGNOSIS — I50.33 ACUTE ON CHRONIC DIASTOLIC HEART FAILURE: Primary | ICD-10-CM

## 2019-02-28 DIAGNOSIS — I15.2 HYPERTENSION ASSOCIATED WITH DIABETES: ICD-10-CM

## 2019-02-28 LAB
ALBUMIN SERPL BCP-MCNC: 4 G/DL
ALP SERPL-CCNC: 68 U/L
ALT SERPL W/O P-5'-P-CCNC: 28 U/L
ANION GAP SERPL CALC-SCNC: 8 MMOL/L
AST SERPL-CCNC: 26 U/L
BASOPHILS # BLD AUTO: 0.02 K/UL
BASOPHILS NFR BLD: 0.3 %
BILIRUB SERPL-MCNC: 0.3 MG/DL
BNP SERPL-MCNC: 269 PG/ML
BUN SERPL-MCNC: 33 MG/DL
CALCIUM SERPL-MCNC: 9.4 MG/DL
CHLORIDE SERPL-SCNC: 100 MMOL/L
CHOLEST SERPL-MCNC: 116 MG/DL
CHOLEST/HDLC SERPL: 4.1 {RATIO}
CO2 SERPL-SCNC: 30 MMOL/L
CREAT SERPL-MCNC: 1 MG/DL
DIFFERENTIAL METHOD: ABNORMAL
EOSINOPHIL # BLD AUTO: 0.4 K/UL
EOSINOPHIL NFR BLD: 5.4 %
ERYTHROCYTE [DISTWIDTH] IN BLOOD BY AUTOMATED COUNT: 13.4 %
EST. GFR  (AFRICAN AMERICAN): >60 ML/MIN/1.73 M^2
EST. GFR  (NON AFRICAN AMERICAN): 56 ML/MIN/1.73 M^2
ESTIMATED AVG GLUCOSE: 151 MG/DL
ESTIMATED AVG GLUCOSE: 151 MG/DL
GLUCOSE SERPL-MCNC: 163 MG/DL
HBA1C MFR BLD HPLC: 6.9 %
HBA1C MFR BLD HPLC: 6.9 %
HCT VFR BLD AUTO: 34.9 %
HDLC SERPL-MCNC: 28 MG/DL
HDLC SERPL: 24.1 %
HGB BLD-MCNC: 10.7 G/DL
IMM GRANULOCYTES # BLD AUTO: 0.02 K/UL
IMM GRANULOCYTES NFR BLD AUTO: 0.3 %
LDLC SERPL CALC-MCNC: 53 MG/DL
LYMPHOCYTES # BLD AUTO: 2.1 K/UL
LYMPHOCYTES NFR BLD: 29.9 %
MCH RBC QN AUTO: 29.2 PG
MCHC RBC AUTO-ENTMCNC: 30.7 G/DL
MCV RBC AUTO: 95 FL
MONOCYTES # BLD AUTO: 0.7 K/UL
MONOCYTES NFR BLD: 9.3 %
NEUTROPHILS # BLD AUTO: 3.8 K/UL
NEUTROPHILS NFR BLD: 54.8 %
NONHDLC SERPL-MCNC: 88 MG/DL
NRBC BLD-RTO: 0 /100 WBC
PLATELET # BLD AUTO: 240 K/UL
PMV BLD AUTO: 10.6 FL
POTASSIUM SERPL-SCNC: 4.9 MMOL/L
PROT SERPL-MCNC: 7.2 G/DL
RBC # BLD AUTO: 3.66 M/UL
SODIUM SERPL-SCNC: 138 MMOL/L
TRIGL SERPL-MCNC: 175 MG/DL
TSH SERPL DL<=0.005 MIU/L-ACNC: 1.79 UIU/ML
WBC # BLD AUTO: 7 K/UL

## 2019-02-28 PROCEDURE — 99999 PR PBB SHADOW E&M-EST. PATIENT-LVL IV: CPT | Mod: PBBFAC,HCNC,, | Performed by: PHYSICIAN ASSISTANT

## 2019-02-28 PROCEDURE — 1100F PR PT FALLS ASSESS DOC 2+ FALLS/FALL W/INJURY/YR: ICD-10-PCS | Mod: HCNC,CPTII,S$GLB, | Performed by: PHYSICIAN ASSISTANT

## 2019-02-28 PROCEDURE — 36415 COLL VENOUS BLD VENIPUNCTURE: CPT | Mod: HCNC,PO

## 2019-02-28 PROCEDURE — 85025 COMPLETE CBC W/AUTO DIFF WBC: CPT | Mod: HCNC

## 2019-02-28 PROCEDURE — 3078F PR MOST RECENT DIASTOLIC BLOOD PRESSURE < 80 MM HG: ICD-10-PCS | Mod: HCNC,CPTII,S$GLB, | Performed by: PHYSICIAN ASSISTANT

## 2019-02-28 PROCEDURE — 84443 ASSAY THYROID STIM HORMONE: CPT | Mod: HCNC

## 2019-02-28 PROCEDURE — 3078F DIAST BP <80 MM HG: CPT | Mod: HCNC,CPTII,S$GLB, | Performed by: PHYSICIAN ASSISTANT

## 2019-02-28 PROCEDURE — 1100F PTFALLS ASSESS-DOCD GE2>/YR: CPT | Mod: HCNC,CPTII,S$GLB, | Performed by: PHYSICIAN ASSISTANT

## 2019-02-28 PROCEDURE — 99214 OFFICE O/P EST MOD 30 MIN: CPT | Mod: HCNC,S$GLB,, | Performed by: PHYSICIAN ASSISTANT

## 2019-02-28 PROCEDURE — 80061 LIPID PANEL: CPT | Mod: HCNC

## 2019-02-28 PROCEDURE — 80053 COMPREHEN METABOLIC PANEL: CPT | Mod: HCNC

## 2019-02-28 PROCEDURE — 3074F SYST BP LT 130 MM HG: CPT | Mod: HCNC,CPTII,S$GLB, | Performed by: PHYSICIAN ASSISTANT

## 2019-02-28 PROCEDURE — 99499 UNLISTED E&M SERVICE: CPT | Mod: HCNC,S$GLB,, | Performed by: PHYSICIAN ASSISTANT

## 2019-02-28 PROCEDURE — 3074F PR MOST RECENT SYSTOLIC BLOOD PRESSURE < 130 MM HG: ICD-10-PCS | Mod: HCNC,CPTII,S$GLB, | Performed by: PHYSICIAN ASSISTANT

## 2019-02-28 PROCEDURE — 3044F HG A1C LEVEL LT 7.0%: CPT | Mod: HCNC,CPTII,S$GLB, | Performed by: PHYSICIAN ASSISTANT

## 2019-02-28 PROCEDURE — 83036 HEMOGLOBIN GLYCOSYLATED A1C: CPT | Mod: HCNC

## 2019-02-28 PROCEDURE — 3044F PR MOST RECENT HEMOGLOBIN A1C LEVEL <7.0%: ICD-10-PCS | Mod: HCNC,CPTII,S$GLB, | Performed by: PHYSICIAN ASSISTANT

## 2019-02-28 PROCEDURE — 99999 PR PBB SHADOW E&M-EST. PATIENT-LVL IV: ICD-10-PCS | Mod: PBBFAC,HCNC,, | Performed by: PHYSICIAN ASSISTANT

## 2019-02-28 PROCEDURE — 3288F PR FALLS RISK ASSESSMENT DOCUMENTED: ICD-10-PCS | Mod: HCNC,CPTII,S$GLB, | Performed by: PHYSICIAN ASSISTANT

## 2019-02-28 PROCEDURE — 3288F FALL RISK ASSESSMENT DOCD: CPT | Mod: HCNC,CPTII,S$GLB, | Performed by: PHYSICIAN ASSISTANT

## 2019-02-28 PROCEDURE — 99499 RISK ADDL DX/OHS AUDIT: ICD-10-PCS | Mod: HCNC,S$GLB,, | Performed by: PHYSICIAN ASSISTANT

## 2019-02-28 PROCEDURE — 83880 ASSAY OF NATRIURETIC PEPTIDE: CPT | Mod: HCNC

## 2019-02-28 PROCEDURE — 99214 PR OFFICE/OUTPT VISIT, EST, LEVL IV, 30-39 MIN: ICD-10-PCS | Mod: HCNC,S$GLB,, | Performed by: PHYSICIAN ASSISTANT

## 2019-02-28 RX ORDER — CARVEDILOL 6.25 MG/1
6.25 TABLET ORAL 2 TIMES DAILY
Refills: 11 | COMMUNITY
Start: 2019-02-01 | End: 2019-04-29 | Stop reason: ALTCHOICE

## 2019-02-28 NOTE — PROGRESS NOTES
"Subjective:       Patient ID: Eva Corona is a 73 y.o. female.    Chief Complaint: Follow-up  HPI   Patient is a 73-year-old female with DM II uncontrolled, hypothyroidism, HTN, A fib, Oesity presenting to the clinic for "6 month" follow-up. Patient has been scheduled to see Dr. Peterson on two separate occasions, but had to be rescheduled.   1. DM II uncontrolled- patient does not check her sugars. She doesn't feel the need to. She is overdue for A1C. She refusing an eye exam. Her foot exam is UTD. She agrees to return for labs and urine microalbumin. She reports compliance with medications.  2. HTN- controlled on current medication. No concerns at this time.  3. Immunization deficiency- patient refuses flu and pneumococcal vaccinations in clinic.  4. Post-menopausal- patient declining bone density scan. Advised on risk of potential fractures with bone loss and she continues to decline.  5. Breast cancer screening- patient declines further mammograms.  6. Low back pain- chronic in nature, seeing a chiropractor. Requesting gel for back pain.  Review of Systems   Constitutional: Negative for activity change, appetite change, chills, diaphoresis, fatigue and fever.   HENT: Negative for congestion, postnasal drip and rhinorrhea.    Respiratory: Negative.  Negative for cough, shortness of breath and wheezing.    Cardiovascular: Negative.  Negative for chest pain.   Gastrointestinal: Negative for abdominal pain, blood in stool, constipation, diarrhea, nausea and vomiting.   Genitourinary: Negative for dysuria, frequency, hematuria and urgency.   Musculoskeletal: Negative.    Skin: Negative.  Negative for color change and rash.   Neurological: Negative for dizziness and syncope.   Psychiatric/Behavioral: Negative for agitation, behavioral problems and confusion.       Objective:      Physical Exam   Constitutional: Vital signs are normal. She appears well-developed and well-nourished. No distress.   Cardiovascular: " Normal rate, regular rhythm, S1 normal, S2 normal and normal heart sounds. Exam reveals no gallop.   No murmur heard.  Pulses:       Radial pulses are 2+ on the right side, and 2+ on the left side.   Pulmonary/Chest: Effort normal and breath sounds normal. No respiratory distress. She has no wheezes. She has no rhonchi.   Abdominal: Soft. Normal appearance and bowel sounds are normal. There is no tenderness. There is no rigidity.   Skin: Skin is warm and dry. She is not diaphoretic.   Appropriate skin turgor   Psychiatric: She has a normal mood and affect. Her speech is normal and behavior is normal. Judgment and thought content normal. Cognition and memory are normal.       Assessment:       1. Acute on chronic diastolic heart failure    2. Uncontrolled type 2 diabetes mellitus without complication, without long-term current use of insulin    3. Hyperlipidemia associated with type 2 diabetes mellitus    4. Persistent atrial fibrillation    5. Abdominal aortic atherosclerosis    6. Hypertension associated with diabetes        Plan:       Eva was seen today for follow-up.    Diagnoses and all orders for this visit:    Acute on chronic diastolic heart failure  Patient reports abdominal distension; RICHTER  Established with cardiology   -     CBC auto differential; Future  -     Brain natriuretic peptide; Future    Uncontrolled type 2 diabetes mellitus without complication, without long-term current use of insulin  Uncontrolled; discussed that patient needs to be seen every 3 months with A1C>7  -     CBC auto differential; Future  -     Comprehensive metabolic panel; Future  -     Hemoglobin A1c; Future  -     Microalbumin/creatinine urine ratio; Future    Hyperlipidemia associated with type 2 diabetes mellitus  Stable; established with cardiology  -     CBC auto differential; Future  -     Hemoglobin A1c; Future  -     Lipid panel; Future    Persistent atrial fibrillation  Stable; established with cardiology  -     TSH;  Future    Abdominal aortic atherosclerosis  Stable; continue coumadin and statin cholesterol medication as directed    Hypertension associated with diabetes    Well controlled; contiue current medications     Patient readiness: acceptance and barriers:none    During the course of the visit the patient was educated and counseled about the following:     Diabetes:  Discussed general issues about diabetes pathophysiology and management.  Hypertension:   Medication: no change.  Obesity:   Regular aerobic exercise program discussed.    Goals: Diabetes: Maintain Hemoglobin A1C below 7, Hypertension: Reduce Blood Pressure and Obesity: Reduce calorie intake and BMI    Did patient meet goals/outcomes: No    The following self management tools provided: declined    Patient Instructions (the written plan) was given to the patient/family.     Time spent with patient: 30 minutes    Barriers to medications present (no )    Adverse reactions to current medications (no)    Over the counter medications reviewed (Yes)

## 2019-03-01 DIAGNOSIS — M54.9 BACK PAIN, UNSPECIFIED BACK LOCATION, UNSPECIFIED BACK PAIN LATERALITY, UNSPECIFIED CHRONICITY: Primary | ICD-10-CM

## 2019-03-01 RX ORDER — DICLOFENAC SODIUM 10 MG/G
2 GEL TOPICAL 3 TIMES DAILY PRN
Qty: 100 G | Refills: 11 | Status: SHIPPED | OUTPATIENT
Start: 2019-03-01 | End: 2020-12-21 | Stop reason: CLARIF

## 2019-03-04 ENCOUNTER — TELEPHONE (OUTPATIENT)
Dept: CARDIOLOGY | Facility: CLINIC | Age: 74
End: 2019-03-04

## 2019-03-04 DIAGNOSIS — D64.9 ANEMIA, UNSPECIFIED TYPE: Primary | ICD-10-CM

## 2019-03-04 PROCEDURE — 82270 OCCULT BLOOD FECES: CPT | Mod: S$GLB,,, | Performed by: PHYSICIAN ASSISTANT

## 2019-03-04 PROCEDURE — 82270 POCT HEMOCULT STOOL X3: ICD-10-PCS | Mod: S$GLB,,, | Performed by: PHYSICIAN ASSISTANT

## 2019-03-06 ENCOUNTER — TELEPHONE (OUTPATIENT)
Dept: CARDIOLOGY | Facility: CLINIC | Age: 74
End: 2019-03-06

## 2019-03-06 NOTE — TELEPHONE ENCOUNTER
Call placed to Ms. Corona in regards to her wanting a sooner appt. I offered her an appt on 4/29/19 @ 1:30p. She accepted. No further issues noted.

## 2019-03-07 ENCOUNTER — LAB VISIT (OUTPATIENT)
Dept: LAB | Facility: HOSPITAL | Age: 74
End: 2019-03-07
Attending: PHYSICIAN ASSISTANT
Payer: MEDICARE

## 2019-03-07 DIAGNOSIS — D64.9 ANEMIA, UNSPECIFIED TYPE: ICD-10-CM

## 2019-03-07 LAB
FERRITIN SERPL-MCNC: 54 NG/ML
IRON SERPL-MCNC: 72 UG/DL
SATURATED IRON: 16 %
TOTAL IRON BINDING CAPACITY: 459 UG/DL
TRANSFERRIN SERPL-MCNC: 310 MG/DL

## 2019-03-07 PROCEDURE — 82728 ASSAY OF FERRITIN: CPT | Mod: HCNC

## 2019-03-07 PROCEDURE — 83540 ASSAY OF IRON: CPT | Mod: HCNC

## 2019-03-07 PROCEDURE — 36415 COLL VENOUS BLD VENIPUNCTURE: CPT | Mod: HCNC,PO

## 2019-03-11 ENCOUNTER — TELEPHONE (OUTPATIENT)
Dept: FAMILY MEDICINE | Facility: CLINIC | Age: 74
End: 2019-03-11

## 2019-03-11 NOTE — TELEPHONE ENCOUNTER
----- Message from Ivania Mcfadden sent at 3/11/2019 12:45 PM CDT -----  Type:  Patient Returning Call    Who Called:  Patient   Who Left Message for Patient:  ??  Does the patient know what this is regarding?:  Possible lab results  Best Call Back Number:  [phone]  Additional Information:

## 2019-03-12 RX ORDER — LEVOTHYROXINE SODIUM 75 UG/1
TABLET ORAL
Qty: 90 TABLET | Refills: 3 | Status: SHIPPED | OUTPATIENT
Start: 2019-03-12 | End: 2020-01-13 | Stop reason: SDUPTHER

## 2019-03-12 RX ORDER — AMLODIPINE BESYLATE 5 MG/1
TABLET ORAL
Qty: 90 TABLET | Refills: 3 | Status: SHIPPED | OUTPATIENT
Start: 2019-03-12 | End: 2020-01-13 | Stop reason: SDUPTHER

## 2019-03-12 RX ORDER — ATORVASTATIN CALCIUM 10 MG/1
TABLET, FILM COATED ORAL
Qty: 90 TABLET | Refills: 3 | Status: SHIPPED | OUTPATIENT
Start: 2019-03-12 | End: 2020-01-13 | Stop reason: SDUPTHER

## 2019-03-12 RX ORDER — GLIMEPIRIDE 4 MG/1
TABLET ORAL
Qty: 180 TABLET | Refills: 1 | Status: SHIPPED | OUTPATIENT
Start: 2019-03-12 | End: 2020-01-13 | Stop reason: SDUPTHER

## 2019-03-12 RX ORDER — BUMETANIDE 0.5 MG/1
0.5 TABLET ORAL DAILY
Qty: 90 TABLET | Refills: 1 | Status: SHIPPED | OUTPATIENT
Start: 2019-03-12 | End: 2019-09-06 | Stop reason: SDUPTHER

## 2019-03-18 RX ORDER — METFORMIN HYDROCHLORIDE 500 MG/1
1000 TABLET ORAL 2 TIMES DAILY WITH MEALS
Qty: 120 TABLET | Refills: 5 | Status: SHIPPED | OUTPATIENT
Start: 2019-03-18 | End: 2019-09-18 | Stop reason: SDUPTHER

## 2019-03-25 NOTE — TELEPHONE ENCOUNTER
Call placed to [facility] in regards to Ms. Corona's message. Spoke with Felicity, and she stated that it's not a PA that is needed, they need a new Rx. I advised them it was sent to the provider this morning, and they should have it by this afternoon. She verbalized understanding. No further issues noted.

## 2019-03-25 NOTE — TELEPHONE ENCOUNTER
----- Message from Isael Kerr sent at 3/25/2019 11:18 AM CDT -----  Contact: Patient  Type: Needs Medical Advice    Who Called:  Patient  Pharmacy name and phone #:    [facility] - [place], [place] - [address] [phone]  [place] [address]  Phone: [phone] Fax: [phone]  Best Call Back Number: [phone]  Additional Information: Patient states that sacubitril-valsartan (ENTRESTO)  mg per tablet is not being refilled due to prior authorization. Please call to advise. Thanks!

## 2019-03-27 LAB
CTP QC/QA: YES
FECAL OCCULT BLOOD, POC: NEGATIVE

## 2019-03-29 ENCOUNTER — PES CALL (OUTPATIENT)
Dept: ADMINISTRATIVE | Facility: CLINIC | Age: 74
End: 2019-03-29

## 2019-04-02 RX ORDER — BUMETANIDE 1 MG/1
TABLET ORAL
Qty: 30 TABLET | Refills: 4 | Status: SHIPPED | OUTPATIENT
Start: 2019-04-02 | End: 2019-08-27 | Stop reason: SDUPTHER

## 2019-04-04 ENCOUNTER — LAB VISIT (OUTPATIENT)
Dept: LAB | Facility: HOSPITAL | Age: 74
End: 2019-04-04
Attending: FAMILY MEDICINE
Payer: MEDICARE

## 2019-04-04 DIAGNOSIS — Z79.01 LONG TERM CURRENT USE OF ANTICOAGULANT THERAPY: ICD-10-CM

## 2019-04-04 LAB
INR PPP: 3.2 (ref 0.8–1.2)
PROTHROMBIN TIME: 32.3 SEC (ref 9–12.5)

## 2019-04-04 PROCEDURE — 36415 COLL VENOUS BLD VENIPUNCTURE: CPT | Mod: HCNC

## 2019-04-04 PROCEDURE — 85610 PROTHROMBIN TIME: CPT | Mod: HCNC

## 2019-04-29 ENCOUNTER — OFFICE VISIT (OUTPATIENT)
Dept: CARDIOLOGY | Facility: CLINIC | Age: 74
End: 2019-04-29
Payer: MEDICARE

## 2019-04-29 VITALS
DIASTOLIC BLOOD PRESSURE: 62 MMHG | HEIGHT: 65 IN | HEART RATE: 93 BPM | SYSTOLIC BLOOD PRESSURE: 126 MMHG | OXYGEN SATURATION: 86 % | BODY MASS INDEX: 33.38 KG/M2 | WEIGHT: 200.38 LBS

## 2019-04-29 DIAGNOSIS — R79.89 PRERENAL AZOTEMIA: ICD-10-CM

## 2019-04-29 DIAGNOSIS — E11.69 HYPERLIPIDEMIA ASSOCIATED WITH TYPE 2 DIABETES MELLITUS: ICD-10-CM

## 2019-04-29 DIAGNOSIS — I48.91 ATRIAL FIBRILLATION, UNSPECIFIED TYPE: ICD-10-CM

## 2019-04-29 DIAGNOSIS — R94.31 ABNORMAL ECG: ICD-10-CM

## 2019-04-29 DIAGNOSIS — Z86.79 S/P ABLATION OF ATRIAL FIBRILLATION: ICD-10-CM

## 2019-04-29 DIAGNOSIS — E11.8 TYPE 2 DIABETES MELLITUS WITH COMPLICATION, WITHOUT LONG-TERM CURRENT USE OF INSULIN: ICD-10-CM

## 2019-04-29 DIAGNOSIS — E66.09 CLASS 1 OBESITY DUE TO EXCESS CALORIES WITHOUT SERIOUS COMORBIDITY WITH BODY MASS INDEX (BMI) OF 33.0 TO 33.9 IN ADULT: ICD-10-CM

## 2019-04-29 DIAGNOSIS — N18.30 CKD (CHRONIC KIDNEY DISEASE) STAGE 3, GFR 30-59 ML/MIN: ICD-10-CM

## 2019-04-29 DIAGNOSIS — D50.0 IRON DEFICIENCY ANEMIA DUE TO CHRONIC BLOOD LOSS: ICD-10-CM

## 2019-04-29 DIAGNOSIS — Z98.890 S/P ABLATION OF ATRIAL FIBRILLATION: ICD-10-CM

## 2019-04-29 DIAGNOSIS — E65 ABDOMINAL OBESITY: ICD-10-CM

## 2019-04-29 DIAGNOSIS — Z99.81 ON HOME OXYGEN THERAPY: ICD-10-CM

## 2019-04-29 DIAGNOSIS — I48.91 ATRIAL FIBRILLATION, UNSPECIFIED TYPE: Primary | ICD-10-CM

## 2019-04-29 DIAGNOSIS — R06.09 DOE (DYSPNEA ON EXERTION): ICD-10-CM

## 2019-04-29 DIAGNOSIS — E78.5 HYPERLIPIDEMIA ASSOCIATED WITH TYPE 2 DIABETES MELLITUS: ICD-10-CM

## 2019-04-29 DIAGNOSIS — I27.20 PULMONARY HYPERTENSION: ICD-10-CM

## 2019-04-29 DIAGNOSIS — Z79.01 LONG TERM CURRENT USE OF ANTICOAGULANT THERAPY: ICD-10-CM

## 2019-04-29 DIAGNOSIS — R94.31 PROLONGED Q-T INTERVAL ON ECG: ICD-10-CM

## 2019-04-29 DIAGNOSIS — R79.89 ELEVATED BRAIN NATRIURETIC PEPTIDE (BNP) LEVEL: ICD-10-CM

## 2019-04-29 DIAGNOSIS — I50.22 CHRONIC SYSTOLIC HEART FAILURE: Primary | ICD-10-CM

## 2019-04-29 PROBLEM — E61.1 IRON DEFICIENCY: Status: ACTIVE | Noted: 2019-04-29

## 2019-04-29 PROBLEM — E61.1 IRON DEFICIENCY: Status: RESOLVED | Noted: 2019-04-29 | Resolved: 2019-04-29

## 2019-04-29 PROCEDURE — 3078F DIAST BP <80 MM HG: CPT | Mod: HCNC,CPTII,S$GLB, | Performed by: INTERNAL MEDICINE

## 2019-04-29 PROCEDURE — 93000 EKG 12-LEAD: ICD-10-PCS | Mod: HCNC,S$GLB,, | Performed by: INTERNAL MEDICINE

## 2019-04-29 PROCEDURE — 93000 ELECTROCARDIOGRAM COMPLETE: CPT | Mod: HCNC,S$GLB,, | Performed by: INTERNAL MEDICINE

## 2019-04-29 PROCEDURE — 99999 PR PBB SHADOW E&M-EST. PATIENT-LVL III: CPT | Mod: PBBFAC,HCNC,, | Performed by: INTERNAL MEDICINE

## 2019-04-29 PROCEDURE — 99499 UNLISTED E&M SERVICE: CPT | Mod: HCNC,S$GLB,, | Performed by: INTERNAL MEDICINE

## 2019-04-29 PROCEDURE — 99215 PR OFFICE/OUTPT VISIT, EST, LEVL V, 40-54 MIN: ICD-10-PCS | Mod: HCNC,S$GLB,, | Performed by: INTERNAL MEDICINE

## 2019-04-29 PROCEDURE — 3044F HG A1C LEVEL LT 7.0%: CPT | Mod: HCNC,CPTII,S$GLB, | Performed by: INTERNAL MEDICINE

## 2019-04-29 PROCEDURE — 1101F PT FALLS ASSESS-DOCD LE1/YR: CPT | Mod: HCNC,CPTII,S$GLB, | Performed by: INTERNAL MEDICINE

## 2019-04-29 PROCEDURE — 3074F PR MOST RECENT SYSTOLIC BLOOD PRESSURE < 130 MM HG: ICD-10-PCS | Mod: HCNC,CPTII,S$GLB, | Performed by: INTERNAL MEDICINE

## 2019-04-29 PROCEDURE — 3074F SYST BP LT 130 MM HG: CPT | Mod: HCNC,CPTII,S$GLB, | Performed by: INTERNAL MEDICINE

## 2019-04-29 PROCEDURE — 1101F PR PT FALLS ASSESS DOC 0-1 FALLS W/OUT INJ PAST YR: ICD-10-PCS | Mod: HCNC,CPTII,S$GLB, | Performed by: INTERNAL MEDICINE

## 2019-04-29 PROCEDURE — 99499 RISK ADDL DX/OHS AUDIT: ICD-10-PCS | Mod: HCNC,S$GLB,, | Performed by: INTERNAL MEDICINE

## 2019-04-29 PROCEDURE — 99215 OFFICE O/P EST HI 40 MIN: CPT | Mod: HCNC,S$GLB,, | Performed by: INTERNAL MEDICINE

## 2019-04-29 PROCEDURE — 3078F PR MOST RECENT DIASTOLIC BLOOD PRESSURE < 80 MM HG: ICD-10-PCS | Mod: HCNC,CPTII,S$GLB, | Performed by: INTERNAL MEDICINE

## 2019-04-29 PROCEDURE — 99999 PR PBB SHADOW E&M-EST. PATIENT-LVL III: ICD-10-PCS | Mod: PBBFAC,HCNC,, | Performed by: INTERNAL MEDICINE

## 2019-04-29 PROCEDURE — 3044F PR MOST RECENT HEMOGLOBIN A1C LEVEL <7.0%: ICD-10-PCS | Mod: HCNC,CPTII,S$GLB, | Performed by: INTERNAL MEDICINE

## 2019-04-29 RX ORDER — FERROUS SULFATE 325(65) MG
325 TABLET ORAL 2 TIMES DAILY
Qty: 60 TABLET | Refills: 11 | Status: SHIPPED | OUTPATIENT
Start: 2019-04-29 | End: 2020-12-21 | Stop reason: CLARIF

## 2019-04-29 RX ORDER — METOPROLOL SUCCINATE 100 MG/1
100 TABLET, EXTENDED RELEASE ORAL DAILY
Qty: 30 TABLET | Refills: 11 | Status: SHIPPED | OUTPATIENT
Start: 2019-04-29 | End: 2019-05-31 | Stop reason: SDUPTHER

## 2019-04-29 NOTE — PATIENT INSTRUCTIONS

## 2019-04-29 NOTE — PROGRESS NOTES
Subjective:    Patient ID:  Eva Corona is a 73 y.o. female who presents for evaluation of 6 month f/u  For HFrEF on 2 low doses BB, HR 94, PAF on NOAC, T2DM on glimepiride   PCP and care for T2DM: Dr. Peterson, next appointment in 10/2019 with Dr. Silva  Prior cardiologist: Dr. 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    Health literacy: medium   Activities: do housework noted SOB with vacuuming after 10-15 minutes, started on exercise machine at home, do nightly for about 10 minutes.   Nicotine: never   Alcohol: none  Illicit drugs: none  Cardiac symptoms: RICHTER  Home BP: 120 to 130  Medication compliance: yes, on both metoprolol succinate and carvedilol   Diet: regular, no salt  Caffeine: 2 cpd  Labs: 2/2019, LDL 53, on Rx, CMP (, BUN 33, eGFR 56), CBC (Hgb 10.7), iron sat only 16%, ferritin 54, normal TSH, A1C 6.5%, urine negative for microalbuminuria,   Last Echo: 8/2018  Last stress test: none  Cardiovascular angiogram: 6/2015  ECG: NSR, 94, ILBBB, STTA, prolong QTc 505 msec.  Fundoscopic exam: over a year    WF here for 6 months review. Felt better after 3 days of Entresto Rx. Medication review as noted.     Dr. London noted: 72 y.o. female with a past medical history of heart failure with reduced ejection fraction, atrial fibrillation, diabetes    Patient is feeling better.  Her echocardiography as noted below shows improvement in her ejection fraction.  She denies any orthopnea PND.  She is currently in NYHA class 1 heart failure.    patient is currently stable from a cardiovascular perspective.  Her ejection fraction remains low but is significantly improved from her prior ejection fraction.  She remains on anticoagulation given her persistent atrial fibrillation and risk of stroke.  She in addition she remains on a beta-blocker and an  "aldosterone inhibitor."    Echo 8/2018 Left ventricle ejection fraction is moderately decreased at 30%  Grade I (mild) left ventricular diastolic dysfunction consistent with impaired relaxation.  LA pressure is normal.  Mitral valve shows mild regurgitation.  Left ventricle shows mild concentric hypertrophy.  RV systolic function is normal.  Left atrium is moderately dilated.  Right atrium is moderately dilated.  Tricuspid valve shows mild regurgitation.    [facility] 6/2015  RA:  (14)  RV: 60  RVEDP: 12  PA: 63/25  PW:  (31)  CO_THERM: 4.7     SUMMARY/POST-OPERATIVE DIAGNOSIS:    1. Increased right heart pressures.  2. Increased mean PCWP with large "v" waves at 55mmHg    E. RECOMMENDATIONS:    1. Maximize medical management.          Review of Systems   Constitution: Negative for diaphoresis, fever, malaise/fatigue, night sweats and weight gain.   HENT: Positive for congestion. Negative for nosebleeds and tinnitus.    Eyes: Negative for visual disturbance.   Cardiovascular: Positive for dyspnea on exertion. Negative for chest pain, claudication, cyanosis, irregular heartbeat, leg swelling, near-syncope, orthopnea, palpitations and paroxysmal nocturnal dyspnea.   Respiratory: Positive for shortness of breath. Negative for cough, sleep disturbances due to breathing, snoring and wheezing.         Allen score 2, unable to complete sleep study in the past.   Endocrine: Negative for polydipsia and polyuria.   Hematologic/Lymphatic: Does not bruise/bleed easily.   Skin: Negative for color change, flushing, nail changes, poor wound healing and suspicious lesions.   Musculoskeletal: Positive for back pain. Negative for arthritis, falls, gout, joint pain, joint swelling, muscle cramps, muscle weakness and myalgias.   Gastrointestinal: Positive for bloating. Negative for heartburn, hematemesis, hematochezia, melena and nausea.   Neurological: Negative for disturbances in coordination, excessive daytime sleepiness, dizziness, " "focal weakness, headaches, light-headedness, loss of balance, numbness, vertigo and weakness.   Psychiatric/Behavioral: Negative for depression and substance abuse. The patient does not have insomnia and is not nervous/anxious.         Objective:    Physical Exam   Constitutional: She is oriented to person, place, and time. She appears well-developed and well-nourished.   HENT:   Head: Normocephalic.   Eyes: Pupils are equal, round, and reactive to light. Conjunctivae and EOM are normal.   Neck: Normal range of motion. Neck supple. No JVD present. No thyromegaly present.   Circumference 16.75"   Cardiovascular: Normal rate, regular rhythm and intact distal pulses. Exam reveals no gallop and no friction rub.   Murmur heard.   Harsh early systolic murmur is present with a grade of 2/6 at the upper right sternal border radiating to the neck.  Pulses:       Carotid pulses are 2+ on the right side, and 2+ on the left side.       Radial pulses are 2+ on the right side, and 2+ on the left side.        Femoral pulses are 2+ on the right side, and 2+ on the left side.       Popliteal pulses are 2+ on the right side, and 2+ on the left side.        Dorsalis pedis pulses are 2+ on the right side, and 2+ on the left side.        Posterior tibial pulses are 2+ on the right side, and 2+ on the left side.   Pulmonary/Chest: Effort normal and breath sounds normal. She has no rales. She exhibits no tenderness.   Abdominal: Soft. Bowel sounds are normal. There is no tenderness.   Waist 47.25"   Musculoskeletal: Normal range of motion. She exhibits no edema.   Lymphadenopathy:     She has no cervical adenopathy.   Neurological: She is alert and oriented to person, place, and time.   Skin: Skin is warm and dry. No rash noted.         Assessment:       1. Chronic systolic heart failure    2. Atrial fibrillation, unspecified type    3. Abnormal ECG    4. Class 1 obesity due to excess calories without serious comorbidity with body mass " index (BMI) of 33.0 to 33.9 in adult    5. Pulmonary hypertension    6. S/P ablation of atrial fibrillation    7. RICHTER (dyspnea on exertion)    8. Type 2 diabetes mellitus with complication, without long-term current use of insulin    9. Long term current use of anticoagulant therapy    10. Iron deficiency anemia due to chronic blood loss    11. Elevated brain natriuretic peptide (BNP) level    12. Hyperlipidemia associated with type 2 diabetes mellitus, baseline     13. CKD (chronic kidney disease) stage 3, GFR 30-59 ml/min    14. Prerenal azotemia    15. Prolonged Q-T interval on ECG    16. On home oxygen therapy    17. Abdominal obesity         Plan:       Eva was seen today for 6 month f/u.    Diagnoses and all orders for this visit:    Chronic systolic heart failure  -     metoprolol succinate (TOPROL-XL) 100 MG 24 hr tablet; Take 1 tablet (100 mg total) by mouth once daily.  -     ferrous sulfate (FEOSOL) 325 mg (65 mg iron) Tab tablet; Take 1 tablet (325 mg total) by mouth 2 (two) times daily. Try on empty stomach first    Atrial fibrillation, unspecified type  -     EKG 12-lead    Abnormal ECG    Class 1 obesity due to excess calories without serious comorbidity with body mass index (BMI) of 33.0 to 33.9 in adult    Pulmonary hypertension    S/P ablation of atrial fibrillation  -     metoprolol succinate (TOPROL-XL) 100 MG 24 hr tablet; Take 1 tablet (100 mg total) by mouth once daily.    RICHTER (dyspnea on exertion)    Type 2 diabetes mellitus with complication, without long-term current use of insulin    Long term current use of anticoagulant therapy  -     ferrous sulfate (FEOSOL) 325 mg (65 mg iron) Tab tablet; Take 1 tablet (325 mg total) by mouth 2 (two) times daily. Try on empty stomach first    Iron deficiency anemia due to chronic blood loss  -     ferrous sulfate (FEOSOL) 325 mg (65 mg iron) Tab tablet; Take 1 tablet (325 mg total) by mouth 2 (two) times daily. Try on empty stomach  first    Elevated brain natriuretic peptide (BNP) level    Hyperlipidemia associated with type 2 diabetes mellitus, baseline     CKD (chronic kidney disease) stage 3, GFR 30-59 ml/min    Prerenal azotemia    Prolonged Q-T interval on ECG    On home oxygen therapy    Abdominal obesity    - All medical issues reviewed, continue current Rx.  - CV status stable, continue current Rx except will change BB, all medications reviewed, patient acknowledge good understanding.  - Recommend healthy living: healthy diet and regular exercise aiming for fitness, and weight control   - Would like to switch off glimepiride to more cardiac friendly medication: Victoza, Jardiance, will refer back to PCP, Dr. Silva  - Instruction for Mediterranean diet and heart healthy exercise given.  - Check home blood pressure, 2 days weekly, do 2 readings within 5 minutes in AM and PM, keep log for review.  - Weigh twice weekly, try to lose 1-2 lbs per week. Target weight loss of 5%-10%.  - Have to do some abdominal exercise to rid belly fat  - Highly recommend 30 minutes of exercise / activities daily, can have Sunday off, with 2-3 sessions of muscle strengthening weekly. A  would be very helpful.  - Will follow up in 4 weeks to check efficacy and BB titration  - Phone review / encourage use of [facility]      Total face-to-face time with the patient was 50 minutes and greater than 50% was spent in counseling and coordination of care. The above assessment and plan have been discussed at length. Prior physician's note reviewed. Labs and procedure over the last 6 months reviewed. Problem List updated. Asked to bring in all active medications / pills bottles with next visit.

## 2019-05-09 ENCOUNTER — LAB VISIT (OUTPATIENT)
Dept: LAB | Facility: HOSPITAL | Age: 74
End: 2019-05-09
Attending: FAMILY MEDICINE
Payer: MEDICARE

## 2019-05-09 DIAGNOSIS — Z79.01 LONG TERM CURRENT USE OF ANTICOAGULANT THERAPY: ICD-10-CM

## 2019-05-09 LAB
INR PPP: 1.9 (ref 0.8–1.2)
PROTHROMBIN TIME: 19.4 SEC (ref 9–12.5)

## 2019-05-09 PROCEDURE — 85610 PROTHROMBIN TIME: CPT | Mod: HCNC

## 2019-05-09 PROCEDURE — 36415 COLL VENOUS BLD VENIPUNCTURE: CPT | Mod: HCNC

## 2019-05-31 ENCOUNTER — OFFICE VISIT (OUTPATIENT)
Dept: CARDIOLOGY | Facility: CLINIC | Age: 74
End: 2019-05-31
Payer: MEDICARE

## 2019-05-31 VITALS
WEIGHT: 198 LBS | HEART RATE: 99 BPM | SYSTOLIC BLOOD PRESSURE: 109 MMHG | OXYGEN SATURATION: 93 % | BODY MASS INDEX: 32.99 KG/M2 | DIASTOLIC BLOOD PRESSURE: 60 MMHG | HEIGHT: 65 IN

## 2019-05-31 DIAGNOSIS — R06.09 DOE (DYSPNEA ON EXERTION): ICD-10-CM

## 2019-05-31 DIAGNOSIS — I48.91 ATRIAL FIBRILLATION, UNSPECIFIED TYPE: ICD-10-CM

## 2019-05-31 DIAGNOSIS — I50.22 CHRONIC SYSTOLIC HEART FAILURE: ICD-10-CM

## 2019-05-31 DIAGNOSIS — I48.19 PERSISTENT ATRIAL FIBRILLATION: ICD-10-CM

## 2019-05-31 DIAGNOSIS — R09.89 CARDIAC LV EJECTION FRACTION 10-20%: Primary | ICD-10-CM

## 2019-05-31 DIAGNOSIS — Z79.01 LONG TERM CURRENT USE OF ANTICOAGULANT THERAPY: ICD-10-CM

## 2019-05-31 DIAGNOSIS — R79.89 ELEVATED BRAIN NATRIURETIC PEPTIDE (BNP) LEVEL: ICD-10-CM

## 2019-05-31 DIAGNOSIS — E11.8 TYPE 2 DIABETES MELLITUS WITH COMPLICATION, WITHOUT LONG-TERM CURRENT USE OF INSULIN: ICD-10-CM

## 2019-05-31 DIAGNOSIS — Z86.79 S/P ABLATION OF ATRIAL FIBRILLATION: ICD-10-CM

## 2019-05-31 DIAGNOSIS — Z99.81 ON HOME OXYGEN THERAPY: ICD-10-CM

## 2019-05-31 DIAGNOSIS — Z98.890 S/P ABLATION OF ATRIAL FIBRILLATION: ICD-10-CM

## 2019-05-31 DIAGNOSIS — E66.09 CLASS 1 OBESITY DUE TO EXCESS CALORIES WITHOUT SERIOUS COMORBIDITY WITH BODY MASS INDEX (BMI) OF 33.0 TO 33.9 IN ADULT: ICD-10-CM

## 2019-05-31 PROCEDURE — 3078F PR MOST RECENT DIASTOLIC BLOOD PRESSURE < 80 MM HG: ICD-10-PCS | Mod: HCNC,CPTII,S$GLB, | Performed by: INTERNAL MEDICINE

## 2019-05-31 PROCEDURE — 99499 UNLISTED E&M SERVICE: CPT | Mod: HCNC,S$GLB,, | Performed by: INTERNAL MEDICINE

## 2019-05-31 PROCEDURE — 3074F SYST BP LT 130 MM HG: CPT | Mod: HCNC,CPTII,S$GLB, | Performed by: INTERNAL MEDICINE

## 2019-05-31 PROCEDURE — 93000 ELECTROCARDIOGRAM COMPLETE: CPT | Mod: HCNC,S$GLB,, | Performed by: INTERNAL MEDICINE

## 2019-05-31 PROCEDURE — 3044F PR MOST RECENT HEMOGLOBIN A1C LEVEL <7.0%: ICD-10-PCS | Mod: HCNC,CPTII,S$GLB, | Performed by: INTERNAL MEDICINE

## 2019-05-31 PROCEDURE — 1101F PT FALLS ASSESS-DOCD LE1/YR: CPT | Mod: HCNC,CPTII,S$GLB, | Performed by: INTERNAL MEDICINE

## 2019-05-31 PROCEDURE — 99214 OFFICE O/P EST MOD 30 MIN: CPT | Mod: HCNC,S$GLB,, | Performed by: INTERNAL MEDICINE

## 2019-05-31 PROCEDURE — 99999 PR PBB SHADOW E&M-EST. PATIENT-LVL III: CPT | Mod: PBBFAC,HCNC,, | Performed by: INTERNAL MEDICINE

## 2019-05-31 PROCEDURE — 3044F HG A1C LEVEL LT 7.0%: CPT | Mod: HCNC,CPTII,S$GLB, | Performed by: INTERNAL MEDICINE

## 2019-05-31 PROCEDURE — 3074F PR MOST RECENT SYSTOLIC BLOOD PRESSURE < 130 MM HG: ICD-10-PCS | Mod: HCNC,CPTII,S$GLB, | Performed by: INTERNAL MEDICINE

## 2019-05-31 PROCEDURE — 1101F PR PT FALLS ASSESS DOC 0-1 FALLS W/OUT INJ PAST YR: ICD-10-PCS | Mod: HCNC,CPTII,S$GLB, | Performed by: INTERNAL MEDICINE

## 2019-05-31 PROCEDURE — 99999 PR PBB SHADOW E&M-EST. PATIENT-LVL III: ICD-10-PCS | Mod: PBBFAC,HCNC,, | Performed by: INTERNAL MEDICINE

## 2019-05-31 PROCEDURE — 3078F DIAST BP <80 MM HG: CPT | Mod: HCNC,CPTII,S$GLB, | Performed by: INTERNAL MEDICINE

## 2019-05-31 PROCEDURE — 99214 PR OFFICE/OUTPT VISIT, EST, LEVL IV, 30-39 MIN: ICD-10-PCS | Mod: HCNC,S$GLB,, | Performed by: INTERNAL MEDICINE

## 2019-05-31 PROCEDURE — 93000 EKG 12-LEAD: ICD-10-PCS | Mod: HCNC,S$GLB,, | Performed by: INTERNAL MEDICINE

## 2019-05-31 PROCEDURE — 99499 RISK ADDL DX/OHS AUDIT: ICD-10-PCS | Mod: HCNC,S$GLB,, | Performed by: INTERNAL MEDICINE

## 2019-05-31 RX ORDER — METOPROLOL SUCCINATE 200 MG/1
200 TABLET, EXTENDED RELEASE ORAL DAILY
Qty: 90 TABLET | Refills: 3 | Status: SHIPPED | OUTPATIENT
Start: 2019-05-31 | End: 2020-05-21

## 2019-05-31 NOTE — PROGRESS NOTES
"Subjective:    Patient ID:  Eva Corona is a 73 y.o. female who presents for evaluation of 1 month f/u  For HFrEF on BB, HR 99, PAF on NOAC, T2DM on glimepiride   PCP and care for T2DM: Dr. Peterson, next appointment in 10/2019 with Dr. Silva  Prior cardiologist: Dr. 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   Nicotine: never   Alcohol: none  Illicit drugs: none  Cardiac symptoms: RICHTER  Home BP: 120 to 130  Medication compliance: yes, on both metoprolol succinate and carvedilol   Diet: regular, no salt  Caffeine: 2 cpd  Labs: 2/2019, LDL 53, on Rx, CMP (, BUN 33, eGFR 56), CBC (Hgb 10.7), iron sat only 16%, ferritin 54, normal TSH, A1C 6.5%, urine negative for microalbuminuria,   Last Echo: 8/2018  Last stress test: none  Cardiovascular angiogram: 6/2015  ECG: NSR, 94, ILBBB, STTA, prolong QTc 505 msec.  Fundoscopic exam: over a year    In 4/2019:  WF here for 6 months review. Felt better after 3 days of Entresto Rx. Medication review as noted.     Dr. London noted: 72 y.o. female with a past medical history of heart failure with reduced ejection fraction, atrial fibrillation, diabetes    Patient is feeling better.  Her echocardiography as noted below shows improvement in her ejection fraction.  She denies any orthopnea PND.  She is currently in NYHA class 1 heart failure.    patient is currently stable from a cardiovascular perspective.  Her ejection fraction remains low but is significantly improved from her prior ejection fraction.  She remains on anticoagulation given her persistent atrial fibrillation and risk of stroke.  She in addition she remains on a beta-blocker and an aldosterone inhibitor."    Echo 8/2018 Left " "ventricle ejection fraction is moderately decreased at 30%  Grade I (mild) left ventricular diastolic dysfunction consistent with impaired relaxation.  LA pressure is normal.  Mitral valve shows mild regurgitation.  Left ventricle shows mild concentric hypertrophy.  RV systolic function is normal.  Left atrium is moderately dilated.  Right atrium is moderately dilated.  Tricuspid valve shows mild regurgitation.    [facility] 6/2015  RA:  (14)  RV: 60  RVEDP: 12  PA: 63/25  PW:  (31)  CO_THERM: 4.7     SUMMARY/POST-OPERATIVE DIAGNOSIS:    1. Increased right heart pressures.  2. Increased mean PCWP with large "v" waves at 55mmHg    E. RECOMMENDATIONS:    1. Maximize medical management.      HPI comments: in 5/2019, feeling better, much improved after 3 days on Entresto, sleeping better, more energy and less fatigue and SOB.     Review of Systems   Constitution: Negative for diaphoresis, fever, malaise/fatigue, night sweats and weight gain.   HENT: Positive for congestion. Negative for nosebleeds and tinnitus.    Eyes: Negative for visual disturbance.   Cardiovascular: Positive for dyspnea on exertion. Negative for chest pain, claudication, cyanosis, irregular heartbeat, leg swelling, near-syncope, orthopnea, palpitations and paroxysmal nocturnal dyspnea.   Respiratory: Positive for shortness of breath. Negative for cough, sleep disturbances due to breathing, snoring and wheezing.         [place] score 2, unable to complete sleep study in the past.   Endocrine: Negative for polydipsia and polyuria.   Hematologic/Lymphatic: Does not bruise/bleed easily.   Skin: Negative for color change, flushing, nail changes, poor wound healing and suspicious lesions.   Musculoskeletal: Positive for back pain. Negative for arthritis, falls, gout, joint pain, joint swelling, muscle cramps, muscle weakness and myalgias.   Gastrointestinal: Positive for bloating. Negative for heartburn, hematemesis, hematochezia, melena and nausea. " "  Neurological: Negative for disturbances in coordination, excessive daytime sleepiness, dizziness, focal weakness, headaches, light-headedness, loss of balance, numbness, vertigo and weakness.   Psychiatric/Behavioral: Negative for depression and substance abuse. The patient does not have insomnia and is not nervous/anxious.         Objective:    Physical Exam   Constitutional: She is oriented to person, place, and time. She appears well-developed and well-nourished.   HENT:   Head: Normocephalic.   Eyes: Pupils are equal, round, and reactive to light. Conjunctivae and EOM are normal.   Neck: Normal range of motion. Neck supple. No JVD present. No thyromegaly present.   Circumference 16.75"   Cardiovascular: Normal rate, regular rhythm and intact distal pulses. Exam reveals no gallop and no friction rub.   Murmur heard.   Harsh early systolic murmur is present with a grade of 2/6 at the upper right sternal border radiating to the neck.  Pulses:       Carotid pulses are 2+ on the right side, and 2+ on the left side.       Radial pulses are 2+ on the right side, and 2+ on the left side.        Femoral pulses are 2+ on the right side, and 2+ on the left side.       Popliteal pulses are 2+ on the right side, and 2+ on the left side.        Dorsalis pedis pulses are 2+ on the right side, and 2+ on the left side.        Posterior tibial pulses are 2+ on the right side, and 2+ on the left side.   Pulmonary/Chest: Effort normal and breath sounds normal. She has no rales. She exhibits no tenderness.   Abdominal: Soft. Bowel sounds are normal. There is no tenderness.   Waist 47.25"   Musculoskeletal: Normal range of motion. She exhibits no edema.   Lymphadenopathy:     She has no cervical adenopathy.   Neurological: She is alert and oriented to person, place, and time.   Skin: Skin is warm and dry. No rash noted.         Assessment:       1. Cardiac LV ejection fraction 10-20%    2. Atrial fibrillation, unspecified type  "   3. Class 1 obesity due to excess calories without serious comorbidity with body mass index (BMI) of 33.0 to 33.9 in adult    4. Elevated brain natriuretic peptide (BNP) level    5. Long term current use of anticoagulant therapy    6. Persistent atrial fibrillation    7. Type 2 diabetes mellitus with complication, without long-term current use of insulin    8. On home oxygen therapy    9. RICHTER (dyspnea on exertion)    10. Chronic systolic heart failure    11. S/P ablation of atrial fibrillation         Plan:       Eva was seen today for 1 month f/u.    Diagnoses and all orders for this visit:    Cardiac LV ejection fraction 10-20%  -     metoprolol succinate (TOPROL-XL) 200 MG 24 hr tablet; Take 1 tablet (200 mg total) by mouth once daily.    Atrial fibrillation, unspecified type  -     EKG 12-lead    Class 1 obesity due to excess calories without serious comorbidity with body mass index (BMI) of 33.0 to 33.9 in adult    Elevated brain natriuretic peptide (BNP) level    Long term current use of anticoagulant therapy    Persistent atrial fibrillation  -     metoprolol succinate (TOPROL-XL) 200 MG 24 hr tablet; Take 1 tablet (200 mg total) by mouth once daily.    Type 2 diabetes mellitus with complication, without long-term current use of insulin    On home oxygen therapy    RICHTER (dyspnea on exertion)    Chronic systolic heart failure  -     metoprolol succinate (TOPROL-XL) 200 MG 24 hr tablet; Take 1 tablet (200 mg total) by mouth once daily.    S/P ablation of atrial fibrillation  -     metoprolol succinate (TOPROL-XL) 200 MG 24 hr tablet; Take 1 tablet (200 mg total) by mouth once daily.    - All medical issues reviewed, continue current Rx.  - CV status stable, continue current Rx except will change BB, all medications reviewed, patient acknowledge good understanding.  - Recommend healthy living: healthy diet and regular exercise aiming for fitness, and weight control   - Would like to switch off glimepiride to  more cardiac friendly medication: Victoza, Jardiance, will refer back to PCP, Dr. Silva  - Instruction for Mediterranean diet and heart healthy exercise given.  - Check home blood pressure, 2 days weekly, do 2 readings within 5 minutes in AM and PM, keep log for review.  - Weigh twice weekly, try to lose 1-2 lbs per week. Target weight loss of 5%-10%.  - Have to do some abdominal exercise to rid belly fat  - Highly recommend 30 minutes of exercise / activities daily, can have Sunday off, with 2-3 sessions of muscle strengthening weekly. A  would be very helpful.  - Recommend at least biannual cardiovascular evaluation in view of patient's significant risk factors. Patient's preference  - Phone review / encourage use of [facility]      Total face-to-face time with the patient was 20 minutes and greater than 50% was spent in counseling and coordination of care. The above assessment and plan have been discussed at length. Labs and procedure over the last 6 months reviewed. Problem List updated. Asked to bring in all active medications / pills bottles with next visit.

## 2019-06-07 ENCOUNTER — LAB VISIT (OUTPATIENT)
Dept: LAB | Facility: HOSPITAL | Age: 74
End: 2019-06-07
Attending: FAMILY MEDICINE
Payer: MEDICARE

## 2019-06-07 DIAGNOSIS — Z79.01 LONG TERM CURRENT USE OF ANTICOAGULANT THERAPY: ICD-10-CM

## 2019-06-07 LAB
INR PPP: 2.3 (ref 0.8–1.2)
PROTHROMBIN TIME: 23.4 SEC (ref 9–12.5)

## 2019-06-07 PROCEDURE — 85610 PROTHROMBIN TIME: CPT | Mod: HCNC

## 2019-06-07 PROCEDURE — 36415 COLL VENOUS BLD VENIPUNCTURE: CPT | Mod: HCNC

## 2019-06-17 ENCOUNTER — TELEPHONE (OUTPATIENT)
Dept: FAMILY MEDICINE | Facility: CLINIC | Age: 74
End: 2019-06-17

## 2019-06-17 NOTE — TELEPHONE ENCOUNTER
----- Message from Marylou Bennett LPN sent at 6/17/2019  2:41 PM CDT -----  Contact: pt      ----- Message -----  From: Geoff Luna  Sent: 6/17/2019   2:08 PM  To: Amauri Gomez Staff ([facility])    Type:  Test Results    Who Called:  pt  Name of Test (Lab/Mammo/Etc):  Blood work  Date of Test:  6.7.19  Ordering Provider:  Patricia Baugh  Where the test was performed[facility]  Best Call Back Number:  [phone]  Additional Information:

## 2019-06-20 ENCOUNTER — PATIENT MESSAGE (OUTPATIENT)
Dept: CARDIOLOGY | Facility: CLINIC | Age: 74
End: 2019-06-20

## 2019-07-02 RX ORDER — FLUTICASONE PROPIONATE 50 MCG
SPRAY, SUSPENSION (ML) NASAL
Qty: 48 G | Refills: 3 | Status: SHIPPED | OUTPATIENT
Start: 2019-07-02 | End: 2020-08-21

## 2019-07-19 ENCOUNTER — LAB VISIT (OUTPATIENT)
Dept: LAB | Facility: HOSPITAL | Age: 74
End: 2019-07-19
Attending: FAMILY MEDICINE
Payer: MEDICARE

## 2019-07-19 DIAGNOSIS — Z79.01 LONG TERM CURRENT USE OF ANTICOAGULANT THERAPY: ICD-10-CM

## 2019-07-19 LAB
INR PPP: 2.7 (ref 0.8–1.2)
PROTHROMBIN TIME: 27 SEC (ref 9–12.5)

## 2019-07-19 PROCEDURE — 36415 COLL VENOUS BLD VENIPUNCTURE: CPT | Mod: HCNC

## 2019-07-19 PROCEDURE — 85610 PROTHROMBIN TIME: CPT | Mod: HCNC

## 2019-08-27 ENCOUNTER — LAB VISIT (OUTPATIENT)
Dept: LAB | Facility: HOSPITAL | Age: 74
End: 2019-08-27
Attending: FAMILY MEDICINE
Payer: MEDICARE

## 2019-08-27 DIAGNOSIS — Z79.01 LONG TERM CURRENT USE OF ANTICOAGULANT THERAPY: ICD-10-CM

## 2019-08-27 LAB
INR PPP: 3.2 (ref 0.8–1.2)
PROTHROMBIN TIME: 32.7 SEC (ref 9–12.5)

## 2019-08-27 PROCEDURE — 36415 COLL VENOUS BLD VENIPUNCTURE: CPT | Mod: HCNC

## 2019-08-27 PROCEDURE — 85610 PROTHROMBIN TIME: CPT | Mod: HCNC

## 2019-08-27 RX ORDER — BUMETANIDE 1 MG/1
TABLET ORAL
Qty: 30 TABLET | Refills: 9 | Status: SHIPPED | OUTPATIENT
Start: 2019-08-27 | End: 2020-06-25

## 2019-09-06 RX ORDER — BUMETANIDE 0.5 MG/1
TABLET ORAL
Qty: 90 TABLET | Refills: 2 | Status: SHIPPED | OUTPATIENT
Start: 2019-09-06 | End: 2020-01-13

## 2019-09-18 NOTE — TELEPHONE ENCOUNTER
----- Message from Zoe June sent at 9/18/2019 11:43 AM CDT -----  Contact: Patient  Type: Needs Medical Advice    Who Called:  Patient  Pharmacy name and phone #:    [facility] - [place], [place] - [address] [phone]  [place] [address]  Phone: [phone] Fax: [phone]    Best Call Back Number: [phone]  Additional Information: Calling to request a Prior Authorization for the metFORMIN (GLUCOPHAGE) 500 MG tablet. Patriciaguzman Baugh left [facility] in April and she was the last Provider to refill this medication. The pt is scheduled to est care on 10/28/19 and she is needing the PA for the medication

## 2019-09-18 NOTE — TELEPHONE ENCOUNTER
LOV: 2/28/19  Next appt: 10/28/19 est care Shira  Labs: 3/7/19  Last refill: 3/18/19       Sending to on call/also future PCP

## 2019-09-19 RX ORDER — METFORMIN HYDROCHLORIDE 500 MG/1
1000 TABLET ORAL 2 TIMES DAILY WITH MEALS
Qty: 120 TABLET | Refills: 3 | Status: SHIPPED | OUTPATIENT
Start: 2019-09-19 | End: 2020-01-19

## 2019-10-01 ENCOUNTER — LAB VISIT (OUTPATIENT)
Dept: LAB | Facility: HOSPITAL | Age: 74
End: 2019-10-01
Attending: INTERNAL MEDICINE
Payer: MEDICARE

## 2019-10-01 ENCOUNTER — PATIENT MESSAGE (OUTPATIENT)
Dept: CARDIOLOGY | Facility: CLINIC | Age: 74
End: 2019-10-01

## 2019-10-01 DIAGNOSIS — Z79.01 LONG TERM CURRENT USE OF ANTICOAGULANT THERAPY: ICD-10-CM

## 2019-10-01 LAB
INR PPP: 1.6 (ref 0.8–1.2)
PROTHROMBIN TIME: 16.7 SEC (ref 9–12.5)

## 2019-10-01 PROCEDURE — 85610 PROTHROMBIN TIME: CPT | Mod: HCNC

## 2019-10-01 PROCEDURE — 36415 COLL VENOUS BLD VENIPUNCTURE: CPT | Mod: HCNC

## 2019-10-02 ENCOUNTER — PATIENT OUTREACH (OUTPATIENT)
Dept: ADMINISTRATIVE | Facility: HOSPITAL | Age: 74
End: 2019-10-02

## 2019-10-02 NOTE — LETTER
October 2, 2019    Eva Corona  [address]  [facility] [address]             [facility]  [address]  [facility] [address]  Phone: [phone] Dear Eva Corona    Your [facility] primary care team is dedicated to assisting you achieve your health goal.  In order to maintain your goal, scheduling your diabetic health maintenance requirements are willams to successful disease management.     Irina Silva MD has reviewed your records and found that you are overdue for your diabetic eye exam.  Yearly eye exams are especially important, as this can identify early eye complications and disease caused by your diabetes.  Irina Silva MD has requested you schedule your diabetic eye exam and encourages you to schedule at any of the [facility] locations.  You can schedule your appointment by calling [phone].  If convenient, I will be happy to assist you in scheduling the appointment via the patient portal.    If you have already completed this exam at an outside facility, please notify us so we may request a copy of the exam and update your chart.     Sincerely,    Cherise Cunningham LPN  Clinical Care Coordinator  [address] [address]  P: [phone]  F: [phone]

## 2019-10-02 NOTE — LETTER
AUTHORIZATION FOR RELEASE OF   CONFIDENTIAL INFORMATION    Dear Dr. Jone Li,    We are seeing Eva Corona, date of birth 1945, in the clinic at [facility]. Irina Silva MD is the patient's PCP. Eva Corona has an outstanding lab/procedure at the time we reviewed her chart. In order to help keep her health information updated, she has authorized us to request the following medical record(s):        (  )  MAMMOGRAM                                      (  )  COLONOSCOPY      (  )  PAP SMEAR                                          (  )  OUTSIDE LAB RESULTS     (  )  DEXA SCAN                                          ( X )  EYE EXAM            (  )  FOOT EXAM                                          (  )  ENTIRE RECORD     (  )  OUTSIDE IMMUNIZATIONS                 (  )  _______________         Please fax records to [facility], Amy L Hammons, MD, [phone]    Cherise Cunningham LPN  Clinical Care Coordinator  [address] [address]  P: [phone]  F: [phone]            Patient Name: Eva Corona  : 1945  Patient Phone #: [phone]

## 2019-10-14 ENCOUNTER — PATIENT OUTREACH (OUTPATIENT)
Dept: ADMINISTRATIVE | Facility: HOSPITAL | Age: 74
End: 2019-10-14

## 2019-10-14 DIAGNOSIS — E11.9 DIABETES MELLITUS WITHOUT COMPLICATION: Primary | ICD-10-CM

## 2019-10-16 ENCOUNTER — TELEPHONE (OUTPATIENT)
Dept: FAMILY MEDICINE | Facility: CLINIC | Age: 74
End: 2019-10-16

## 2019-10-16 NOTE — TELEPHONE ENCOUNTER
----- Message from Danielle Solares sent at 10/16/2019  4:09 PM CDT -----  Advising that she sees Dr Lam / eye Dr Cisneros.appointment 12/16/19 ... Received a letter to have her eyes checked for diabetes / [phone] (home)

## 2019-10-17 ENCOUNTER — LAB VISIT (OUTPATIENT)
Dept: LAB | Facility: HOSPITAL | Age: 74
End: 2019-10-17
Attending: INTERNAL MEDICINE
Payer: MEDICARE

## 2019-10-17 DIAGNOSIS — Z79.01 LONG TERM CURRENT USE OF ANTICOAGULANT THERAPY: ICD-10-CM

## 2019-10-17 LAB
INR PPP: 1.8 (ref 0.8–1.2)
PROTHROMBIN TIME: 18.4 SEC (ref 9–12.5)

## 2019-10-17 PROCEDURE — 36415 COLL VENOUS BLD VENIPUNCTURE: CPT | Mod: HCNC

## 2019-10-17 PROCEDURE — 85610 PROTHROMBIN TIME: CPT | Mod: HCNC

## 2019-10-28 ENCOUNTER — TELEPHONE (OUTPATIENT)
Dept: FAMILY MEDICINE | Facility: CLINIC | Age: 74
End: 2019-10-28

## 2019-10-28 NOTE — TELEPHONE ENCOUNTER
Called patient regarding appointment on today 10/28/19 , no answer left detail voice message for patient to call back to rescheduled appointment.

## 2019-10-28 NOTE — TELEPHONE ENCOUNTER
----- Message from Carmina Bell sent at 10/28/2019 11:15 AM CDT -----  Contact: Self  Patient rec'd a call that the doctor is out sick today and needs another appt, but refused the 05/20 appt I can give her.  Please call back at [phone] (home) to get her in the office.   Thanks

## 2019-10-28 NOTE — TELEPHONE ENCOUNTER
Returned call and spoke to patient regarding appointment. Patient was informed that Dr. Silva is out sick today 10/28/19 and that her appointment needs to be rescheduled. Patient is scheduled to see Jazzmine Terry Np on Wednesday 10/30/19

## 2019-10-30 ENCOUNTER — OFFICE VISIT (OUTPATIENT)
Dept: FAMILY MEDICINE | Facility: CLINIC | Age: 74
End: 2019-10-30
Payer: MEDICARE

## 2019-10-30 VITALS
HEART RATE: 94 BPM | SYSTOLIC BLOOD PRESSURE: 128 MMHG | WEIGHT: 198.44 LBS | BODY MASS INDEX: 33.06 KG/M2 | OXYGEN SATURATION: 91 % | DIASTOLIC BLOOD PRESSURE: 58 MMHG | TEMPERATURE: 98 F | HEIGHT: 65 IN

## 2019-10-30 DIAGNOSIS — I50.22 CHRONIC SYSTOLIC HEART FAILURE: ICD-10-CM

## 2019-10-30 DIAGNOSIS — Z79.01 LONG TERM CURRENT USE OF ANTICOAGULANT THERAPY: ICD-10-CM

## 2019-10-30 DIAGNOSIS — R25.1 TREMOR: ICD-10-CM

## 2019-10-30 DIAGNOSIS — N18.30 CKD (CHRONIC KIDNEY DISEASE) STAGE 3, GFR 30-59 ML/MIN: ICD-10-CM

## 2019-10-30 DIAGNOSIS — Z99.81 ON HOME OXYGEN THERAPY: ICD-10-CM

## 2019-10-30 DIAGNOSIS — E11.9 TYPE 2 DIABETES MELLITUS WITHOUT COMPLICATION, WITHOUT LONG-TERM CURRENT USE OF INSULIN: Primary | ICD-10-CM

## 2019-10-30 DIAGNOSIS — E03.9 HYPOTHYROIDISM, UNSPECIFIED TYPE: ICD-10-CM

## 2019-10-30 DIAGNOSIS — Z86.79 S/P ABLATION OF ATRIAL FIBRILLATION: ICD-10-CM

## 2019-10-30 DIAGNOSIS — E11.59 HYPERTENSION ASSOCIATED WITH DIABETES: ICD-10-CM

## 2019-10-30 DIAGNOSIS — E11.69 HYPERLIPIDEMIA ASSOCIATED WITH TYPE 2 DIABETES MELLITUS: ICD-10-CM

## 2019-10-30 DIAGNOSIS — Z98.890 S/P ABLATION OF ATRIAL FIBRILLATION: ICD-10-CM

## 2019-10-30 DIAGNOSIS — E78.5 HYPERLIPIDEMIA ASSOCIATED WITH TYPE 2 DIABETES MELLITUS: ICD-10-CM

## 2019-10-30 DIAGNOSIS — I15.2 HYPERTENSION ASSOCIATED WITH DIABETES: ICD-10-CM

## 2019-10-30 DIAGNOSIS — I48.0 PAF (PAROXYSMAL ATRIAL FIBRILLATION): ICD-10-CM

## 2019-10-30 DIAGNOSIS — I27.20 PULMONARY HYPERTENSION: ICD-10-CM

## 2019-10-30 PROCEDURE — 3074F SYST BP LT 130 MM HG: CPT | Mod: HCNC,CPTII,S$GLB, | Performed by: NURSE PRACTITIONER

## 2019-10-30 PROCEDURE — 99214 PR OFFICE/OUTPT VISIT, EST, LEVL IV, 30-39 MIN: ICD-10-PCS | Mod: HCNC,S$GLB,, | Performed by: NURSE PRACTITIONER

## 2019-10-30 PROCEDURE — 1101F PR PT FALLS ASSESS DOC 0-1 FALLS W/OUT INJ PAST YR: ICD-10-PCS | Mod: HCNC,CPTII,S$GLB, | Performed by: NURSE PRACTITIONER

## 2019-10-30 PROCEDURE — 3044F HG A1C LEVEL LT 7.0%: CPT | Mod: HCNC,CPTII,S$GLB, | Performed by: NURSE PRACTITIONER

## 2019-10-30 PROCEDURE — 3074F PR MOST RECENT SYSTOLIC BLOOD PRESSURE < 130 MM HG: ICD-10-PCS | Mod: HCNC,CPTII,S$GLB, | Performed by: NURSE PRACTITIONER

## 2019-10-30 PROCEDURE — 3078F PR MOST RECENT DIASTOLIC BLOOD PRESSURE < 80 MM HG: ICD-10-PCS | Mod: HCNC,CPTII,S$GLB, | Performed by: NURSE PRACTITIONER

## 2019-10-30 PROCEDURE — 3044F PR MOST RECENT HEMOGLOBIN A1C LEVEL <7.0%: ICD-10-PCS | Mod: HCNC,CPTII,S$GLB, | Performed by: NURSE PRACTITIONER

## 2019-10-30 PROCEDURE — 99999 PR PBB SHADOW E&M-EST. PATIENT-LVL IV: ICD-10-PCS | Mod: PBBFAC,HCNC,, | Performed by: NURSE PRACTITIONER

## 2019-10-30 PROCEDURE — 99214 OFFICE O/P EST MOD 30 MIN: CPT | Mod: HCNC,S$GLB,, | Performed by: NURSE PRACTITIONER

## 2019-10-30 PROCEDURE — 1101F PT FALLS ASSESS-DOCD LE1/YR: CPT | Mod: HCNC,CPTII,S$GLB, | Performed by: NURSE PRACTITIONER

## 2019-10-30 PROCEDURE — 99999 PR PBB SHADOW E&M-EST. PATIENT-LVL IV: CPT | Mod: PBBFAC,HCNC,, | Performed by: NURSE PRACTITIONER

## 2019-10-30 PROCEDURE — 3078F DIAST BP <80 MM HG: CPT | Mod: HCNC,CPTII,S$GLB, | Performed by: NURSE PRACTITIONER

## 2019-10-30 NOTE — PROGRESS NOTES
Subjective:       Patient ID: Eva Corona is a 74 y.o. female.    Chief Complaint: No chief complaint on file.    Ms. Corona is a 74-year-old female patient who presents today for chronic conditions follow-up and to establish care with a new primary care provider.  Previous patient of Dr. Peterson.  Has significant medical history including hypothyroidism, hypertension, congestive heart failure, paroxysmal atrial fibrillation, high cholesterol, pulmonary hypertension, type 2 diabetes, chronic kidney disease, and home oxygen therapy.  She is feeling well today without complaint.  She is due for multiple vaccines, DEXA scan, mammogram, foot exam, eye exam and labs.  She declines all vaccines, DEXA scan, mammogram.  Has appointment for eye exam with Dr. Li scheduled in December.  She is a patient of Dr. Avalos for cardiology.  She is anticoagulated on Coumadin, [facility].  She reports an intentional 10 lb weight loss.  Has been making efforts in her diet. Per our documentation, she is up 1 lb since May 2019.  She does use home oxygen therapy, mostly at night.  Does not have here today.  O2 sat 91% on room air.  Daughter reports she will occasionally notice a tremor of Ms. Corona's head, mouth and right hand.  It does not occur constantly.  Requesting Neurology referral for evaluation.  Not present on exam today.  She is taking all medications as prescribed.     Patient Active Problem List   Diagnosis    Hypothyroid    Hypertension associated with diabetes    Acute on chronic diastolic heart failure    Long term current use of anticoagulant therapy    PAF (paroxysmal atrial fibrillation)    Class 1 obesity due to excess calories with body mass index (BMI) of 33.0 to 33.9 in adult    Cardiomyopathy    RICHTER (dyspnea on exertion)    Persistent atrial fibrillation    Hyperlipidemia associated with type 2 diabetes mellitus, baseline     Retroperitoneal hematoma    S/P ablation of  atrial fibrillation    Pulmonary hypertension    Obesity, Class I, BMI 30-34.9    Anemia    Type 2 diabetes mellitus with complication, without long-term current use of insulin    Abdominal aortic atherosclerosis    Nonischemic cardiomyopathy    Cardiac LV ejection fraction 10-20%    Abnormal ECG    Chronic systolic heart failure    Elevated brain natriuretic peptide (BNP) level    CKD (chronic kidney disease) stage 3, GFR 30-59 ml/min    Prerenal azotemia    Prolonged Q-T interval on ECG    On home oxygen therapy    Abdominal obesity       Current Outpatient Medications:     acetaminophen (TYLENOL) 325 MG tablet, Take 2 tablets (650 mg total) by mouth every 6 (six) hours as needed for Pain., Disp: , Rfl: 0    amLODIPine (NORVASC) 5 MG tablet, TAKE 1 TABLET EVERY DAY, Disp: 90 tablet, Rfl: 3    ascorbic acid (VITAMIN C) 1000 MG tablet, Take 1,000 mg by mouth once daily., Disp: , Rfl:     atorvastatin (LIPITOR) 10 MG tablet, TAKE 1 TABLET EVERY DAY, Disp: 90 tablet, Rfl: 3    bumetanide (BUMEX) 0.5 MG Tab, TAKE ONE TABLET BY MOUTH EVERY DAY, Disp: 90 tablet, Rfl: 2    bumetanide (BUMEX) 1 MG tablet, TAKE ONE TABLET BY MOUTH EVERY DAY, Disp: 30 tablet, Rfl: 9    COCONUT OIL ORAL, Take by mouth., Disp: , Rfl:     diclofenac sodium (VOLTAREN) 1 % Gel, Apply 2 g topically 3 (three) times daily as needed., Disp: 100 g, Rfl: 11    ferrous sulfate (FEOSOL) 325 mg (65 mg iron) Tab tablet, Take 1 tablet (325 mg total) by mouth 2 (two) times daily. Try on empty stomach first, Disp: 60 tablet, Rfl: 11    fluticasone propionate (FLONASE) 50 mcg/actuation nasal spray, USE 2 SPRAYS IN EACH NOSTRIL EVERY EVENING, Disp: 48 g, Rfl: 3    glimepiride (AMARYL) 4 MG tablet, TAKE 2 TABLETS EVERY DAY WITH BREAKFAST, Disp: 180 tablet, Rfl: 1    lansoprazole (PREVACID SOLUTAB) 30 MG disintegrating tablet, Take 30 mg by mouth once daily., Disp: , Rfl:     levothyroxine (SYNTHROID) 75 MCG tablet, TAKE 1 TABLET EVERY  DAY  BEFORE  BREAKFAST, Disp: 90 tablet, Rfl: 3    metFORMIN (GLUCOPHAGE) 500 MG tablet, Take 2 tablets (1,000 mg total) by mouth 2 (two) times daily with meals., Disp: 120 tablet, Rfl: 3    metoprolol succinate (TOPROL-XL) 200 MG 24 hr tablet, Take 1 tablet (200 mg total) by mouth once daily., Disp: 90 tablet, Rfl: 3    MULTIVIT-MIN/FA/CA CARB/VIT K (ONE-A-DAY WOMEN'S 50+ ORAL), Take 1 tablet by mouth once daily. , Disp: , Rfl:     potassium chloride SA (K-DUR,KLOR-CON) 20 MEQ tablet, Take 1 tablet (20 mEq total) by mouth once daily., Disp: 30 tablet, Rfl: 11    sacubitril-valsartan (ENTRESTO)  mg per tablet, Take 1 tablet by mouth 2 (two) times daily., Disp: 180 tablet, Rfl: 3    spironolactone (ALDACTONE) 25 MG tablet, TAKE ONE TABLET BY MOUTH EVERY DAY, Disp: 30 tablet, Rfl: 11    VITAMIN A ORAL, Take 1 tablet by mouth once daily. , Disp: , Rfl:     warfarin (COUMADIN) 5 MG tablet, TAKE 1 AND 1/2 TABLETS (7.5 MG) ON MON, WED, FRI. AND TAKE 1 TABLET (5 MG) DAILY  ALL OTHER DAYS., Disp: 110 tablet, Rfl: 11    Lab Results   Component Value Date    WBC 7.00 02/28/2019    HGB 10.7 (L) 02/28/2019    HCT 34.9 (L) 02/28/2019     02/28/2019    CHOL 116 (L) 02/28/2019    TRIG 175 (H) 02/28/2019    HDL 28 (L) 02/28/2019    ALT 28 02/28/2019    AST 26 02/28/2019     02/28/2019    K 4.9 02/28/2019     02/28/2019    CREATININE 1.0 02/28/2019    BUN 33 (H) 02/28/2019    CO2 30 (H) 02/28/2019    TSH 1.788 02/28/2019    INR 1.8 (H) 10/17/2019    GLUF 152 (H) 12/01/2009    HGBA1C 6.9 (H) 02/28/2019    HGBA1C 6.9 (H) 02/28/2019     Review of Systems   Constitutional: Negative for appetite change, chills, fatigue, fever and unexpected weight change (intentional weight loss of approx 10 pounds).   Respiratory: Negative for cough, shortness of breath and wheezing.    Cardiovascular: Negative for chest pain, palpitations and leg swelling.   Gastrointestinal: Negative for abdominal pain, blood in stool,  constipation, diarrhea, nausea and vomiting.   Genitourinary: Negative for difficulty urinating, dysuria, frequency and urgency.   Neurological: Negative for dizziness, weakness, light-headedness and headaches.   Psychiatric/Behavioral: Negative for decreased concentration and dysphoric mood. The patient is not nervous/anxious.        Objective:      Physical Exam   Constitutional: She is oriented to person, place, and time. Vital signs are normal. She appears well-developed and well-nourished. No distress.   Obese body habitus    Neck: Neck supple.   Cardiovascular: Normal rate, regular rhythm and normal heart sounds.   Pulses:       Dorsalis pedis pulses are 2+ on the right side, and 2+ on the left side.   Pulmonary/Chest: Effort normal and breath sounds normal. She has no wheezes. She has no rales.   Abdominal: Soft. Normal appearance.   Musculoskeletal: She exhibits no edema.   Feet:   Right Foot:   Protective Sensation: 10 sites tested. 10 sites sensed.   Skin Integrity: Negative for skin breakdown or dry skin.   Left Foot:   Protective Sensation: 10 sites tested. 10 sites sensed.   Skin Integrity: Positive for callus (heel). Negative for skin breakdown or dry skin.   Lymphadenopathy:     She has no cervical adenopathy.   Neurological: She is alert and oriented to person, place, and time.   No tremor noted on exam    Skin: Skin is warm and dry.   Psychiatric: She has a normal mood and affect.   Nursing note and vitals reviewed.      Assessment:       1. Type 2 diabetes mellitus without complication, without long-term current use of insulin    2. Hypothyroidism, unspecified type    3. Tremor    4. PAF (paroxysmal atrial fibrillation)    5. Pulmonary hypertension    6. S/P ablation of atrial fibrillation    7. Hypertension associated with diabetes    8. Hyperlipidemia associated with type 2 diabetes mellitus, baseline     9. Chronic systolic heart failure    10. CKD (chronic kidney disease) stage 3, GFR  30-59 ml/min    11. Long term current use of anticoagulant therapy    12. On home oxygen therapy        Plan:       Diagnoses and all orders for this visit:    Type 2 diabetes mellitus without complication, without long-term current use of insulin  -     Hemoglobin A1c; Future  -     Comprehensive metabolic panel; Future  -     CBC auto differential; Future  Last A1c 6.9%, at goal.  Continue medications as prescribed.  Adjust as needed based on labs    Hypothyroidism, unspecified type  -     TSH; Future  Stable condition.  Continue current medications.  Will adjust based on lab findings or if condition changes.  Check labs    Tremor  -     Ambulatory referral to Neurology  Not noted on exam today  Refer for evaluation    PAF (paroxysmal atrial fibrillation)  Regular rhythm today  Cardiology following    Pulmonary hypertension  Cardiology following    S/P ablation of atrial fibrillation  Cardiology following   See above    Hypertension associated with diabetes  Controlled on current medication    Hyperlipidemia associated with type 2 diabetes mellitus, baseline         -     Lipid panel; Future  Stable condition.  Continue current medications.  Will adjust based on lab findings or if condition changes.  On statin    Chronic systolic heart failure  No signs or symptoms of exacerbation today  Cardiology following  Encourage patient to monitor weight at home, notify clinic or go to ED if symptoms of exacerbation, 3 lb weight gain in 24 hr period    CKD (chronic kidney disease) stage 3, GFR 30-59 ml/min  Stable and chronic.  Will continue to monitor q3-6 months and control chronic conditions as optimally as possible to preserve function.  Check labs    Long term current use of anticoagulant therapy  [facility]    On home oxygen therapy  Encouraged used home oxygen saturation monitoring    Follow-up to establish care with Dr. Silva

## 2019-10-31 ENCOUNTER — TELEPHONE (OUTPATIENT)
Dept: FAMILY MEDICINE | Facility: CLINIC | Age: 74
End: 2019-10-31

## 2019-10-31 ENCOUNTER — TELEPHONE (OUTPATIENT)
Dept: NEUROLOGY | Facility: CLINIC | Age: 74
End: 2019-10-31

## 2019-10-31 NOTE — TELEPHONE ENCOUNTER
----- Message from Soledad Wells sent at 10/31/2019  1:09 PM CDT -----  Contact: patient   Patient need to speak with a nurse regarding a referral that is needed for a neurologist, please call back at [phone] (home)

## 2019-10-31 NOTE — TELEPHONE ENCOUNTER
Called and left message for  regarding an appt with  for tremors.I stated to give us a call back regarding this matter.

## 2019-10-31 NOTE — TELEPHONE ENCOUNTER
----- Message from Bhavna Fabian sent at 10/31/2019  1:32 PM CDT -----  Pt needs an appt for Tremor  Please call her @ [phone]  Thanks !

## 2019-11-01 ENCOUNTER — TELEPHONE (OUTPATIENT)
Dept: NEUROLOGY | Facility: CLINIC | Age: 74
End: 2019-11-01

## 2019-11-01 NOTE — TELEPHONE ENCOUNTER
----- Message from Lexis Lay sent at 11/1/2019 12:26 PM CDT -----  Contact: Self  Pt is calling to speak with Staff regarding being scheduled for an appt from the referral in [facility].  was unable to find availability due to an exhausted template.    She can be reached at [phone].    Thank you.

## 2019-11-04 ENCOUNTER — PATIENT OUTREACH (OUTPATIENT)
Dept: ADMINISTRATIVE | Facility: HOSPITAL | Age: 74
End: 2019-11-04

## 2019-11-04 NOTE — LETTER
November 4, 2019    Eva Corona  [address]  [facility] [address]             [facility]  [address]  [facility] [address]  Phone: [phone] Eva Corona  [address]  [facility] [address]        Dear, Eva Corona      [facility] is committed to your overall health.  To help you get the most out of each of your visits, we will review your information to make sure you are up to date on all of your recommended tests and/or procedures.      Dr. Irina Silva MD  has found that your chart shows you may be due for       Health Maintenance Due   Topic Date Due    Mammogram  03/03/2016    DEXA SCAN  03/03/2017    Eye Exam  05/10/2018    Hemoglobin A1c  05/28/2019     If you have had any of the above done at another facility, please bring the records or information with you so that your record at [facility] will be complete.  If you would like to schedule any of these, please contact the clinic at [phone].      Thank You,    Your [facility] Team,  MD Natividad Brice,  Panel Care Coordinator  [facility]  [address] [place] [id number]  Phone [phone]  Fax [phone]

## 2019-11-06 NOTE — TELEPHONE ENCOUNTER
Called and left a message for  regarding an appt with . I stated to give us a call back regarding this matter

## 2019-11-07 ENCOUNTER — LAB VISIT (OUTPATIENT)
Dept: LAB | Facility: HOSPITAL | Age: 74
End: 2019-11-07
Attending: INTERNAL MEDICINE
Payer: MEDICARE

## 2019-11-07 DIAGNOSIS — Z79.01 LONG TERM CURRENT USE OF ANTICOAGULANT THERAPY: ICD-10-CM

## 2019-11-07 LAB
INR PPP: 2.3 (ref 0.8–1.2)
PROTHROMBIN TIME: 23.8 SEC (ref 9–12.5)

## 2019-11-07 PROCEDURE — 36415 COLL VENOUS BLD VENIPUNCTURE: CPT | Mod: HCNC

## 2019-11-07 PROCEDURE — 85610 PROTHROMBIN TIME: CPT | Mod: HCNC

## 2019-11-13 ENCOUNTER — PATIENT MESSAGE (OUTPATIENT)
Dept: FAMILY MEDICINE | Facility: CLINIC | Age: 74
End: 2019-11-13

## 2019-11-13 ENCOUNTER — TELEPHONE (OUTPATIENT)
Dept: FAMILY MEDICINE | Facility: CLINIC | Age: 74
End: 2019-11-13

## 2019-11-13 NOTE — TELEPHONE ENCOUNTER
----- Message from Katharine Galarza sent at 11/13/2019 12:29 PM CST -----  Contact: patient  Type:  Test Results    Who Called:  patient  Name of Test (Lab/Mammo/Etc):  lab  Date of Test:  11/7    Best Call Back Number:  [phone]  Additional Information:

## 2019-11-13 NOTE — TELEPHONE ENCOUNTER
Spoke with patient explained Dr. Kiser is out until Monday but I will send her a message on the need for test results patient has verbal understanding.

## 2019-11-15 ENCOUNTER — TELEPHONE (OUTPATIENT)
Dept: FAMILY MEDICINE | Facility: CLINIC | Age: 74
End: 2019-11-15

## 2019-11-15 NOTE — TELEPHONE ENCOUNTER
Spoke with patient regarding her message , informed her that she would have to speak to Dr. Avalos's staff regarding her results PT/INR, message sent to dr. Avalos's office.

## 2019-11-15 NOTE — TELEPHONE ENCOUNTER
Unable to reach patient by phone , sent email requesting a call back. The test she is inquiring about is her PT/INR ordered by Dr. Avalos. Forwarding message for staff to contact patient.

## 2019-11-15 NOTE — TELEPHONE ENCOUNTER
----- Message from Bhumika Awad sent at 11/14/2019 11:31 AM CST -----  Type:  Test Results    Who Called:  Eva   Name of Test (Lab/Mammo/Etc):  lab  Date of Test:  11/7  Ordering Provider:  JAGDEEP Terry  Where the test was performed:  [facility]  Best Call Back Number: [phone]  Additional Information:  Thank you!

## 2019-11-19 ENCOUNTER — OFFICE VISIT (OUTPATIENT)
Dept: CARDIOLOGY | Facility: CLINIC | Age: 74
End: 2019-11-19
Payer: MEDICARE

## 2019-11-19 VITALS
DIASTOLIC BLOOD PRESSURE: 58 MMHG | HEART RATE: 95 BPM | SYSTOLIC BLOOD PRESSURE: 117 MMHG | OXYGEN SATURATION: 88 % | WEIGHT: 194.69 LBS | HEIGHT: 65 IN | BODY MASS INDEX: 32.44 KG/M2

## 2019-11-19 DIAGNOSIS — N18.30 CKD (CHRONIC KIDNEY DISEASE) STAGE 3, GFR 30-59 ML/MIN: ICD-10-CM

## 2019-11-19 DIAGNOSIS — I48.91 ATRIAL FIBRILLATION, UNSPECIFIED TYPE: ICD-10-CM

## 2019-11-19 DIAGNOSIS — E03.9 HYPOTHYROIDISM, UNSPECIFIED TYPE: ICD-10-CM

## 2019-11-19 DIAGNOSIS — E66.09 CLASS 1 OBESITY DUE TO EXCESS CALORIES WITHOUT SERIOUS COMORBIDITY WITH BODY MASS INDEX (BMI) OF 33.0 TO 33.9 IN ADULT: ICD-10-CM

## 2019-11-19 DIAGNOSIS — Z79.01 ANTICOAGULANT LONG-TERM USE: ICD-10-CM

## 2019-11-19 DIAGNOSIS — Z86.79 S/P ABLATION OF ATRIAL FIBRILLATION: ICD-10-CM

## 2019-11-19 DIAGNOSIS — E11.69 HYPERLIPIDEMIA ASSOCIATED WITH TYPE 2 DIABETES MELLITUS: ICD-10-CM

## 2019-11-19 DIAGNOSIS — E65 ABDOMINAL OBESITY: ICD-10-CM

## 2019-11-19 DIAGNOSIS — E66.9 OBESITY, CLASS I, BMI 30-34.9: ICD-10-CM

## 2019-11-19 DIAGNOSIS — E11.9 TYPE 2 DIABETES MELLITUS WITHOUT COMPLICATION, WITHOUT LONG-TERM CURRENT USE OF INSULIN: ICD-10-CM

## 2019-11-19 DIAGNOSIS — Z98.890 S/P ABLATION OF ATRIAL FIBRILLATION: ICD-10-CM

## 2019-11-19 DIAGNOSIS — I50.22 CHRONIC SYSTOLIC HEART FAILURE: Primary | ICD-10-CM

## 2019-11-19 DIAGNOSIS — E78.5 HYPERLIPIDEMIA ASSOCIATED WITH TYPE 2 DIABETES MELLITUS: ICD-10-CM

## 2019-11-19 PROCEDURE — 99999 PR PBB SHADOW E&M-EST. PATIENT-LVL III: ICD-10-PCS | Mod: PBBFAC,HCNC,, | Performed by: INTERNAL MEDICINE

## 2019-11-19 PROCEDURE — 93000 EKG 12-LEAD: ICD-10-PCS | Mod: HCNC,S$GLB,, | Performed by: INTERNAL MEDICINE

## 2019-11-19 PROCEDURE — 1159F PR MEDICATION LIST DOCUMENTED IN MEDICAL RECORD: ICD-10-PCS | Mod: HCNC,S$GLB,, | Performed by: INTERNAL MEDICINE

## 2019-11-19 PROCEDURE — 1126F AMNT PAIN NOTED NONE PRSNT: CPT | Mod: HCNC,S$GLB,, | Performed by: INTERNAL MEDICINE

## 2019-11-19 PROCEDURE — 99215 OFFICE O/P EST HI 40 MIN: CPT | Mod: HCNC,25,S$GLB, | Performed by: INTERNAL MEDICINE

## 2019-11-19 PROCEDURE — 93000 ELECTROCARDIOGRAM COMPLETE: CPT | Mod: HCNC,S$GLB,, | Performed by: INTERNAL MEDICINE

## 2019-11-19 PROCEDURE — 99999 PR PBB SHADOW E&M-EST. PATIENT-LVL III: CPT | Mod: PBBFAC,HCNC,, | Performed by: INTERNAL MEDICINE

## 2019-11-19 PROCEDURE — 3044F PR MOST RECENT HEMOGLOBIN A1C LEVEL <7.0%: ICD-10-PCS | Mod: HCNC,CPTII,S$GLB, | Performed by: INTERNAL MEDICINE

## 2019-11-19 PROCEDURE — 1126F PR PAIN SEVERITY QUANTIFIED, NO PAIN PRESENT: ICD-10-PCS | Mod: HCNC,S$GLB,, | Performed by: INTERNAL MEDICINE

## 2019-11-19 PROCEDURE — 3074F PR MOST RECENT SYSTOLIC BLOOD PRESSURE < 130 MM HG: ICD-10-PCS | Mod: HCNC,CPTII,S$GLB, | Performed by: INTERNAL MEDICINE

## 2019-11-19 PROCEDURE — 1101F PT FALLS ASSESS-DOCD LE1/YR: CPT | Mod: HCNC,CPTII,S$GLB, | Performed by: INTERNAL MEDICINE

## 2019-11-19 PROCEDURE — 1159F MED LIST DOCD IN RCRD: CPT | Mod: HCNC,S$GLB,, | Performed by: INTERNAL MEDICINE

## 2019-11-19 PROCEDURE — 3044F HG A1C LEVEL LT 7.0%: CPT | Mod: HCNC,CPTII,S$GLB, | Performed by: INTERNAL MEDICINE

## 2019-11-19 PROCEDURE — 1101F PR PT FALLS ASSESS DOC 0-1 FALLS W/OUT INJ PAST YR: ICD-10-PCS | Mod: HCNC,CPTII,S$GLB, | Performed by: INTERNAL MEDICINE

## 2019-11-19 PROCEDURE — 3074F SYST BP LT 130 MM HG: CPT | Mod: HCNC,CPTII,S$GLB, | Performed by: INTERNAL MEDICINE

## 2019-11-19 PROCEDURE — 3078F DIAST BP <80 MM HG: CPT | Mod: HCNC,CPTII,S$GLB, | Performed by: INTERNAL MEDICINE

## 2019-11-19 PROCEDURE — 99215 PR OFFICE/OUTPT VISIT, EST, LEVL V, 40-54 MIN: ICD-10-PCS | Mod: HCNC,25,S$GLB, | Performed by: INTERNAL MEDICINE

## 2019-11-19 PROCEDURE — 3078F PR MOST RECENT DIASTOLIC BLOOD PRESSURE < 80 MM HG: ICD-10-PCS | Mod: HCNC,CPTII,S$GLB, | Performed by: INTERNAL MEDICINE

## 2019-11-19 NOTE — PROGRESS NOTES
Subjective:    Patient ID:  Eva Corona is a 74 y.o. female who presents for evaluation of 6 month f/u  For HFrEF on BB, HR 95, PAF on NOAC, T2DM on glimepiride   PCP and care for T2DM: Dr. Silva, see Ms. Terry, JAGDEEP  Prior cardiologist: Dr. 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   Nicotine: never   Alcohol: none  Illicit drugs: none  Cardiac symptoms: RICHTER  Home BP: 120 to 130  Medication compliance: yes, on both metoprolol succinate and carvedilol   Diet: regular, no salt  Caffeine: 2 cpd  Labs: 2/2019, LDL 53, on Rx, CMP (, BUN 33, eGFR 56), CBC (Hgb 10.7), iron sat only 16%, ferritin 54, normal TSH, A1C 6.5%, urine negative for microalbuminuria,   Last Echo: 8/2018  Last stress test: 1/2014 Lexiscan  Cardiovascular angiogram: 6/2015  ECG: NSR, 95, ILBBB, STTA, prolong QTc 490 msec.  Fundoscopic exam: over a year, appointment in 12/2019    In 4/2019:  WF here for 6 months review. Felt better after 3 days of Entresto Rx. Medication review as noted.     Dr. London noted: 72 y.o. female with a past medical history of heart failure with reduced ejection fraction, atrial fibrillation, diabetes    Patient is feeling better.  Her echocardiography as noted below shows improvement in her ejection fraction.  She denies any orthopnea PND.  She is currently in NYHA class 1 heart failure.    patient is currently stable from a cardiovascular perspective.  Her ejection fraction remains low but is significantly improved from her prior ejection fraction.  She remains on anticoagulation given her persistent atrial fibrillation and risk of stroke.  She in addition she remains on a beta-blocker and an aldosterone  "inhibitor."    Echo 8/2018 Left ventricle ejection fraction is moderately decreased at 30%  Grade I (mild) left ventricular diastolic dysfunction consistent with impaired relaxation.  LA pressure is normal.  Mitral valve shows mild regurgitation.  Left ventricle shows mild concentric hypertrophy.  RV systolic function is normal.  Left atrium is moderately dilated.  Right atrium is moderately dilated.  Tricuspid valve shows mild regurgitation.    [facility] 6/2015  RA:  (14)  RV: 60  RVEDP: 12  PA: 63/25  PW:  (31)  CO_THERM: 4.7     SUMMARY/POST-OPERATIVE DIAGNOSIS:    1. Increased right heart pressures.  2. Increased mean PCWP with large "v" waves at 55mmHg    E. RECOMMENDATIONS:    1. Maximize medical management.      In 5/2019, feeling better, much improved after 3 days on Entresto, sleeping better, more energy and less fatigue and SOB.     HPI comments: in 11/2019, here for 6-months review. Feeling good but quite sedentary. Labs not completed. Remains on glimepiride.     Review of Systems   Constitution: Negative for diaphoresis, fever, malaise/fatigue, night sweats and weight gain.   HENT: Positive for congestion. Negative for nosebleeds and tinnitus.    Eyes: Negative for visual disturbance.   Cardiovascular: Positive for dyspnea on exertion. Negative for chest pain, claudication, cyanosis, irregular heartbeat, leg swelling, near-syncope, orthopnea, palpitations and paroxysmal nocturnal dyspnea.   Respiratory: Positive for shortness of breath. Negative for cough, sleep disturbances due to breathing, snoring and wheezing.         [place] score 2 today a 1, unable to complete sleep study in the past.   Endocrine: Negative for polydipsia and polyuria.   Hematologic/Lymphatic: Does not bruise/bleed easily.   Skin: Negative for color change, flushing, nail changes, poor wound healing and suspicious lesions.   Musculoskeletal: Positive for back pain. Negative for arthritis, falls, gout, joint pain, joint swelling, muscle " "cramps, muscle weakness and myalgias.   Gastrointestinal: Positive for bloating. Negative for heartburn, hematemesis, hematochezia, melena and nausea.   Genitourinary: Positive for bladder incontinence.   Neurological: Positive for difficulty with concentration. Negative for disturbances in coordination, excessive daytime sleepiness, dizziness, focal weakness, headaches, light-headedness, loss of balance, numbness, vertigo and weakness.   Psychiatric/Behavioral: Negative for depression and substance abuse. The patient does not have insomnia and is not nervous/anxious.         Objective:    Physical Exam   Constitutional: She is oriented to person, place, and time. She appears well-developed and well-nourished.   HENT:   Head: Normocephalic.   Eyes: Pupils are equal, round, and reactive to light. Conjunctivae and EOM are normal.   Neck: Normal range of motion. Neck supple. No JVD present. No thyromegaly present.   Circumference 16.75"   Cardiovascular: Normal rate, regular rhythm and intact distal pulses. Exam reveals no gallop and no friction rub.   Murmur heard.   Harsh early systolic murmur is present with a grade of 2/6 at the upper right sternal border radiating to the neck.  Pulses:       Carotid pulses are 2+ on the right side, and 2+ on the left side.       Radial pulses are 2+ on the right side, and 2+ on the left side.        Femoral pulses are 2+ on the right side, and 2+ on the left side.       Popliteal pulses are 2+ on the right side, and 2+ on the left side.        Dorsalis pedis pulses are 2+ on the right side, and 2+ on the left side.        Posterior tibial pulses are 2+ on the right side, and 2+ on the left side.   Pulmonary/Chest: Effort normal and breath sounds normal. She has no rales. She exhibits no tenderness.   Abdominal: Soft. Bowel sounds are normal. There is no tenderness.   Waist 47.25" increased to 48"   Musculoskeletal: Normal range of motion. She exhibits no edema.   Lymphadenopathy: "     She has no cervical adenopathy.   Neurological: She is alert and oriented to person, place, and time.   Skin: Skin is warm and dry. No rash noted.         Assessment:       1. Chronic systolic heart failure    2. Atrial fibrillation, unspecified type    3. Hypothyroidism, unspecified type    4. Hyperlipidemia associated with type 2 diabetes mellitus, baseline     5. Type 2 diabetes mellitus without complication, without long-term current use of insulin    6. Obesity, Class I, BMI 30-34.9, today 32.3    7. Abdominal obesity    8. Anticoagulant long-term use    9. S/P ablation of atrial fibrillation    10. CKD (chronic kidney disease) stage 3, GFR 30-59 ml/min    11. Class 1 obesity due to excess calories without serious comorbidity with body mass index (BMI) of 33.0 to 33.9 in adult         Plan:       Eva was seen today for 6 month f/u.    Diagnoses and all orders for this visit:    Chronic systolic heart failure    Atrial fibrillation, unspecified type  -     EKG 12-lead  -     apixaban (ELIQUIS) 5 mg Tab; Take 1 tablet (5 mg total) by mouth 2 (two) times daily.    Hypothyroidism, unspecified type  -     TSH  -     CBC auto differential  -     Comprehensive metabolic panel    Hyperlipidemia associated with type 2 diabetes mellitus, baseline   -     Lipid panel    Type 2 diabetes mellitus without complication, without long-term current use of insulin  -     Hemoglobin A1c    Obesity, Class I, BMI 30-34.9, today 32.3    Abdominal obesity    Anticoagulant long-term use  -     apixaban (ELIQUIS) 5 mg Tab; Take 1 tablet (5 mg total) by mouth 2 (two) times daily.    S/P ablation of atrial fibrillation  -     apixaban (ELIQUIS) 5 mg Tab; Take 1 tablet (5 mg total) by mouth 2 (two) times daily.    CKD (chronic kidney disease) stage 3, GFR 30-59 ml/min    Class 1 obesity due to excess calories without serious comorbidity with body mass index (BMI) of 33.0 to 33.9 in adult    - All medical issues reviewed,  patient request change to NOAC, INR 2.3 will hold warfarin for 1 day before starting.  - Would like to switch off glimepiride to more cardiac friendly medication: Victoza, Jardiance, will refer back to PCP, Dr. Silva / Ms. Terry, JAGDEEP  - CV status stable, all medications reviewed, patient acknowledge good understanding.  - Recommend healthy living: healthy diet and regular exercise aiming for fitness, and weight control   - Instruction for Mediterranean diet and heart healthy exercise given.  - Check home blood pressure, 2 days weekly, do 2 readings within 5 minutes in AM and PM, keep log for review.  - Weigh twice weekly, try to lose 1-2 lbs per week. Target weight loss of 5%-10%.  - Have to do some abdominal exercise to rid belly fat  - Highly recommend 30 minutes of exercise / activities daily, can have Sunday off, with 2-3 sessions of muscle strengthening weekly. A  would be very helpful.  - Recommend at least biannual cardiovascular evaluation in view of patient's significant risk factors. Patient's preference  - Phone review / encourage use of [facility]      Greater than 50% of the time was spent in counseling and coordination of care. The above assessment and plan have been discussed at length. Labs and procedure over the last 6 months reviewed. Problem List updated. Asked to bring in all active medications / pills bottles with next visit.

## 2019-11-19 NOTE — PROGRESS NOTES
" Patient ID:  Eva Corona is a 74 y.o. female who presents for {Misc; evaluation/follow-up:65026::"follow-up"} of 6 month f/u      Health literacy: {DESC; LOW/MEDIUM/HIGH:98821} medium  Activities:  Walk around grocery store  Nicotine: never  Alcohol: none  Illicit drugs: none  Cardiac symptoms: none  Home BP:yes, 120/58  Medication compliance: yes  Diet: regular, diabetic, Mediterranean regular, no salt  Caffeine: 2 cpd  Labs:   Last Echo:2018   Last stress test: 2014  Cardiovascular angiogram:   EC2019   Fundoscopic exam:     EPWORTH SLEEPINESS SCALE 10/10/2016   Sitting and reading 0   Watching TV 0   Sitting, inactive in a public place (e.g. a theatre or a meeting) 0   As a passenger in a car for an hour without a break 0   Lying down to rest in the afternoon when circumstances permit 0   Sitting and talking to someone 0   Sitting quietly after a lunch without alcohol 0   In a car, while stopped for a few minutes in traffic 0   Total score 0         HPI    ROS     Objective:    Physical Exam      Assessment:       1. Atrial fibrillation, unspecified type         Plan:         Atrial fibrillation, unspecified type  -     EKG 12-lead                "

## 2019-12-06 RX ORDER — SPIRONOLACTONE 25 MG/1
TABLET ORAL
Qty: 30 TABLET | Refills: 11 | Status: SHIPPED | OUTPATIENT
Start: 2019-12-06 | End: 2020-05-22 | Stop reason: SDUPTHER

## 2019-12-09 ENCOUNTER — HOSPITAL ENCOUNTER (EMERGENCY)
Facility: HOSPITAL | Age: 74
Discharge: HOME | End: 2019-12-09
Attending: EMERGENCY MEDICINE
Payer: MEDICARE

## 2019-12-09 VITALS
HEART RATE: 86 BPM | OXYGEN SATURATION: 94 % | HEIGHT: 65 IN | BODY MASS INDEX: 32.91 KG/M2 | SYSTOLIC BLOOD PRESSURE: 123 MMHG | RESPIRATION RATE: 16 BRPM | DIASTOLIC BLOOD PRESSURE: 58 MMHG | WEIGHT: 197.56 LBS | TEMPERATURE: 97 F

## 2019-12-09 DIAGNOSIS — K62.5 RECTAL BLEEDING: Primary | ICD-10-CM

## 2019-12-09 LAB
ALBUMIN SERPL BCP-MCNC: 4.2 G/DL (ref 3.5–5.2)
ALP SERPL-CCNC: 62 U/L (ref 55–135)
ALT SERPL W/O P-5'-P-CCNC: 25 U/L (ref 10–44)
ANION GAP SERPL CALC-SCNC: 11 MMOL/L (ref 8–16)
APTT BLDCRRT: 26.7 SEC (ref 21–32)
AST SERPL-CCNC: 24 U/L (ref 10–40)
BASOPHILS # BLD AUTO: 0.03 K/UL (ref 0–0.2)
BASOPHILS NFR BLD: 0.4 % (ref 0–1.9)
BILIRUB SERPL-MCNC: 0.2 MG/DL (ref 0.1–1)
BUN SERPL-MCNC: 28 MG/DL (ref 8–23)
CALCIUM SERPL-MCNC: 10 MG/DL (ref 8.7–10.5)
CHLORIDE SERPL-SCNC: 105 MMOL/L (ref 95–110)
CO2 SERPL-SCNC: 30 MMOL/L (ref 23–29)
CREAT SERPL-MCNC: 1.2 MG/DL (ref 0.5–1.4)
DIFFERENTIAL METHOD: ABNORMAL
EOSINOPHIL # BLD AUTO: 0.4 K/UL (ref 0–0.5)
EOSINOPHIL NFR BLD: 5.1 % (ref 0–8)
ERYTHROCYTE [DISTWIDTH] IN BLOOD BY AUTOMATED COUNT: 14 % (ref 11.5–14.5)
EST. GFR  (AFRICAN AMERICAN): 51 ML/MIN/1.73 M^2
EST. GFR  (NON AFRICAN AMERICAN): 45 ML/MIN/1.73 M^2
GLUCOSE SERPL-MCNC: 185 MG/DL (ref 70–110)
HCT VFR BLD AUTO: 33.1 % (ref 37–48.5)
HGB BLD-MCNC: 10.2 G/DL (ref 12–16)
IMM GRANULOCYTES # BLD AUTO: 0.01 K/UL (ref 0–0.04)
INR PPP: 1.1 (ref 0.8–1.2)
LYMPHOCYTES # BLD AUTO: 2.1 K/UL (ref 1–4.8)
LYMPHOCYTES NFR BLD: 30.6 % (ref 18–48)
MCH RBC QN AUTO: 29.7 PG (ref 27–31)
MCHC RBC AUTO-ENTMCNC: 30.8 G/DL (ref 32–36)
MCV RBC AUTO: 97 FL (ref 82–98)
MONOCYTES # BLD AUTO: 0.6 K/UL (ref 0.3–1)
MONOCYTES NFR BLD: 8.5 % (ref 4–15)
NEUTROPHILS # BLD AUTO: 3.8 K/UL (ref 1.8–7.7)
NEUTROPHILS NFR BLD: 55.3 % (ref 38–73)
NRBC BLD-RTO: 0 /100 WBC
PLATELET # BLD AUTO: 204 K/UL (ref 150–350)
PMV BLD AUTO: 10 FL (ref 9.2–12.9)
POTASSIUM SERPL-SCNC: 4.4 MMOL/L (ref 3.5–5.1)
PROT SERPL-MCNC: 7 G/DL (ref 6–8.4)
PROTHROMBIN TIME: 11 SEC (ref 9–12.5)
RBC # BLD AUTO: 3.43 M/UL (ref 4–5.4)
SODIUM SERPL-SCNC: 146 MMOL/L (ref 136–145)
WBC # BLD AUTO: 6.86 K/UL (ref 3.9–12.7)

## 2019-12-09 PROCEDURE — 85610 PROTHROMBIN TIME: CPT | Mod: HCNC

## 2019-12-09 PROCEDURE — 99283 EMERGENCY DEPT VISIT LOW MDM: CPT | Mod: HCNC

## 2019-12-09 PROCEDURE — 85730 THROMBOPLASTIN TIME PARTIAL: CPT | Mod: HCNC

## 2019-12-09 PROCEDURE — 85025 COMPLETE CBC W/AUTO DIFF WBC: CPT | Mod: HCNC

## 2019-12-09 PROCEDURE — 80053 COMPREHEN METABOLIC PANEL: CPT | Mod: HCNC

## 2019-12-09 PROCEDURE — 36415 COLL VENOUS BLD VENIPUNCTURE: CPT | Mod: HCNC

## 2019-12-09 RX ORDER — SACUBITRIL AND VALSARTAN 97; 103 MG/1; MG/1
TABLET, FILM COATED ORAL
Qty: 180 TABLET | Refills: 3 | Status: SHIPPED | OUTPATIENT
Start: 2019-12-09 | End: 2020-01-13

## 2019-12-10 NOTE — ED NOTES
Assumed care:  Eva Corona is awake, alert and oriented x 3, skin warm and dry, in NAD with family at bedside.  Patient states that she has had some bright red blood when wiping after BMs that started about 3 weeks ago.  States tonight there was more blood than normal.  Denies hemorrhoids    Patient identifiers for Eva Corona checked and correct.  LOC:  Eva Corona is awake, alert, and aware of environment with an appropriate affect. She is oriented x 3 and speaking appropriately.  APPEARANCE:  She is resting comfortably and in no acute distress. She is clean and well groomed, patient's clothing is properly fastened.  SKIN:  The skin is warm and dry. She has normal skin turgor and moist mucus membranes. Skin is intact; no bruising or breakdown noted.  pale  MUSCULOSKELETAL:  She is moving all extremities well, no obvious deformities noted. Pulses intact.   RESPIRATORY:  Airway is open and patent. Respirations are spontaneous and non-labored with normal effort and rate.  CARDIAC:  She has a normal rate and rhythm. No peripheral edema noted. Capillary refill < 3 seconds.  ABDOMEN:  No distention noted.  Soft and non-tender upon palpation.  Rectal bleeding.  NEUROLOGICAL:  PERRL. Facial expression is symmetrical. Hand grasps are equal bilaterally. Normal sensation in all extremities when touched with finger.  Allergies reported:    Review of patient's allergies indicates:   Allergen Reactions    Codeine Nausea And Vomiting    Neuromuscular blockers, steroidal      Other reaction(s): Unknown    Morphine Nausea And Vomiting     OTHER NOTES:  .

## 2019-12-10 NOTE — ED PROVIDER NOTES
Encounter Date: 12/9/2019    SCRIBE #1 NOTE: I, Janet Flores, jenny scribing for, and in the presence of, Narendra Morales III, MD.       History     Chief Complaint   Patient presents with    Rectal Bleeding     First happened a couple of weeks ago, a little bit at that time. Used preparation H and got better. Returned tonight and heavier       Time seen by provider: 10:30 PM on 12/09/2019    Eva Corona is a 74 y.o. female who presents to the ED with c/o rectal bleeding after bowel movements since this evening. Pt reports of a small amount of rectal bleeding 2 weeks ago that resolved until tonight. Pt denies nose bleeds, hematuria, and oral bleeding. Denies Hx of hemorrhoids. Pt denies rectal pain, abdominal pain, or any other symptoms at this time. Pt reports of switching from Coumadin to Eliquis 3 weeks ago. PMHx of HTN, CHF, type 2 DM, and hypothyroidism. PSHx of colonoscopy.      The history is provided by the patient.     Review of patient's allergies indicates:   Allergen Reactions    Codeine Nausea And Vomiting    Neuromuscular blockers, steroidal      Other reaction(s): Unknown    Morphine Nausea And Vomiting     Past Medical History:   Diagnosis Date    Anticoagulant long-term use     Atrial fibrillation     Cataract     CHF (congestive heart failure)     Diabetes mellitus     Encounter for blood transfusion     Hyperlipidemia     Hypertension     Hypothyroidism     Iron deficiency 4/29/2019    On home oxygen therapy     2L, nightly    S/P ablation of atrial fibrillation 7/22/2015    Shortness of breath on exertion     Thyroid disease     Type 2 diabetes mellitus      Past Surgical History:   Procedure Laterality Date    CARDIAC CATHETERIZATION Right     CARDIAC ELECTROPHYSIOLOGY MAPPING AND ABLATION      COLONOSCOPY      EYE SURGERY Bilateral     cataract, implants    HYSTERECTOMY      TVH, ovaries intact, benign     Family History   Problem Relation Age of Onset    Diabetes Mother      Heart disease Sister     No Known Problems Brother     No Known Problems Daughter     No Known Problems Son     No Known Problems Brother     No Known Problems Brother     No Known Problems Son     Cancer Neg Hx      Social History     Tobacco Use    Smoking status: Never Smoker    Smokeless tobacco: Never Used   Substance Use Topics    Alcohol use: No     Alcohol/week: 0.0 standard drinks    Drug use: No     Review of Systems   Constitutional: Negative for activity change, appetite change, chills, fatigue and fever.   HENT: Negative for nosebleeds.         Negative for gum bleeding.   Eyes: Negative for visual disturbance.   Respiratory: Negative for apnea and shortness of breath.    Cardiovascular: Negative for chest pain and palpitations.   Gastrointestinal: Positive for anal bleeding. Negative for abdominal distention, abdominal pain and rectal pain.   Genitourinary: Negative for difficulty urinating and hematuria.   Musculoskeletal: Negative for neck pain.   Skin: Negative for pallor and rash.   Neurological: Negative for headaches.   Hematological: Does not bruise/bleed easily.   Psychiatric/Behavioral: Negative for agitation.       Physical Exam     Initial Vitals [12/09/19 2222]   BP Pulse Resp Temp SpO2   134/61 94 16 97.4 °F (36.3 °C) (!) 92 %      MAP       --         Physical Exam    Nursing note and vitals reviewed.  Constitutional: She appears well-developed and well-nourished. She is not diaphoretic. No distress.   HENT:   Head: Normocephalic and atraumatic.   Eyes: Conjunctivae are normal.   Neck: Normal range of motion. Neck supple.   Cardiovascular: Normal rate, regular rhythm and normal heart sounds. Exam reveals no gallop and no friction rub.    No murmur heard.  Pulmonary/Chest: Effort normal and breath sounds normal. No respiratory distress. She has no wheezes. She has no rhonchi. She has no rales.   Abdominal: Soft. She exhibits no distension. There is no tenderness.    Genitourinary: Rectal exam shows no external hemorrhoid.   Genitourinary Comments: Bright red blood visibly noted to rectum.   Musculoskeletal: Normal range of motion.   Neurological: She is alert and oriented to person, place, and time.   Skin: Skin is warm and dry. No erythema.   Psychiatric: She has a normal mood and affect.         ED Course   Procedures  Labs Reviewed - No data to display       Imaging Results    None          Medical Decision Making:   History:   Old Medical Records: I decided to obtain old medical records.  Clinical Tests:   Lab Tests: Ordered and Reviewed  ED Management:  74-year-old female presents with bleeding on the tissue after defecation.  She is currently treated with Eliquis which most likely accounts for the bleeding.  She is hemodynamically stable with no significant decrease in hemoglobin.  She is encouraged to decrease Eliquis and follow up with Gastroenterology.  She reports a normal lower endoscopy within the past few years.       APC / Resident Notes:   I, Dr. Narendra Morales III, personally performed the services described in this documentation. All medical record entries made by the scribe were at my direction and in my presence.  I have reviewed the chart and agree that the record reflects my personal performance and is accurate and complete       Scribe Attestation:   Scribe #1: I performed the above scribed service and the documentation accurately describes the services I performed. I attest to the accuracy of the note.                          Clinical Impression:       ICD-10-CM ICD-9-CM   1. Rectal bleeding K62.5 569.3                             Narendra Morales III, MD  12/10/19 0004

## 2019-12-12 ENCOUNTER — OFFICE VISIT (OUTPATIENT)
Dept: FAMILY MEDICINE | Facility: CLINIC | Age: 74
End: 2019-12-12
Payer: MEDICARE

## 2019-12-12 ENCOUNTER — TELEPHONE (OUTPATIENT)
Dept: CARDIOLOGY | Facility: CLINIC | Age: 74
End: 2019-12-12

## 2019-12-12 VITALS
DIASTOLIC BLOOD PRESSURE: 48 MMHG | OXYGEN SATURATION: 94 % | TEMPERATURE: 98 F | BODY MASS INDEX: 32.91 KG/M2 | WEIGHT: 197.56 LBS | HEART RATE: 101 BPM | SYSTOLIC BLOOD PRESSURE: 118 MMHG | HEIGHT: 65 IN

## 2019-12-12 DIAGNOSIS — K62.5 BRIGHT RED RECTAL BLEEDING: Primary | ICD-10-CM

## 2019-12-12 PROCEDURE — 99999 PR PBB SHADOW E&M-EST. PATIENT-LVL III: ICD-10-PCS | Mod: PBBFAC,HCNC,, | Performed by: FAMILY MEDICINE

## 2019-12-12 PROCEDURE — 99212 OFFICE O/P EST SF 10 MIN: CPT | Mod: HCNC,S$GLB,, | Performed by: FAMILY MEDICINE

## 2019-12-12 PROCEDURE — 99999 PR PBB SHADOW E&M-EST. PATIENT-LVL III: CPT | Mod: PBBFAC,HCNC,, | Performed by: FAMILY MEDICINE

## 2019-12-12 PROCEDURE — 99212 PR OFFICE/OUTPT VISIT, EST, LEVL II, 10-19 MIN: ICD-10-PCS | Mod: HCNC,S$GLB,, | Performed by: FAMILY MEDICINE

## 2019-12-12 NOTE — TELEPHONE ENCOUNTER
Call placed to Ms. Velasquez, I advised her per Dr. Avalos that Need work up for the rectal bleed. Was in NSR during visit in mid 11/2019. Can hold Eliquis for up to 2 weeks but would get back on once cleared by GI / PCP. She stated that she was seen by her GI doctor today, and he stated that she could continue taking her Eliquis as she was no longer bleeding. I advised her that I would let Eusebia Avalos know.

## 2019-12-12 NOTE — TELEPHONE ENCOUNTER
Call placed to Ms. Velasquez in regards to message received. No answer, left message to return call.

## 2019-12-12 NOTE — PROGRESS NOTES
Subjective:       Patient ID: Eva Corona is a 74 y.o. female.    Chief Complaint: Follow-up (rectal bleeding. bleeding has stopped)    New to me patient here for UC visit to f/u ERV due to some rectal bleeding.  Pt is on Eliquis - changed from Coumadin in mid-November by Dr Avalos for hx of Atrial Fib.  She is asking about whether she needs to still remain on Eliquis or Coumadin and I defer that decision to Dr Avalos.    Follow-up   This is a new problem. The current episode started in the past 7 days. The problem has been resolved. Pertinent negatives include no abdominal pain, chest pain, fever, nausea, rash or vomiting. Associated symptoms comments: One episode of several spots of BRB on toilet tissue.  No pain and no recurrence since then..     Review of Systems   Constitutional: Negative for fever.   Respiratory: Negative for shortness of breath.    Cardiovascular: Negative for chest pain.   Gastrointestinal: Negative for abdominal pain, constipation, diarrhea, nausea and vomiting.   Skin: Negative for rash.   Hematological:        No other recent bleeding.   All other systems reviewed and are negative.      Objective:      Physical Exam   Constitutional: She appears well-developed. No distress.   Cardiovascular: Normal rate and regular rhythm. Exam reveals no gallop.   Murmur heard.  Pulmonary/Chest: Effort normal and breath sounds normal. She has no rales.       Assessment:     Eva was seen today for follow-up.    Diagnoses and all orders for this visit:    Bright red rectal bleeding    Reassured that a small amt of BRB is not worrisome unless it recurs.  Stay well hydrated and stool softener as needed to avoid constipation.    Pt will contact Dr Avalos with question of Eliquis vs Coumadin vs neither med.

## 2019-12-12 NOTE — TELEPHONE ENCOUNTER
----- Message from Darcy Mota sent at 12/12/2019  3:01 PM CST -----  Contact: MUMTAZ MONTALVO [[id number]]  Contact: MUMTAZ MONTALVO [[id number]]    What is the request in detail:   Requesting a call in regards to medication and if she needs to continue taking Eliquis     Can the clinic reply by [facility]:  No      What Number to Call Back if not in [facility]:   [phone]

## 2019-12-12 NOTE — TELEPHONE ENCOUNTER
----- Message from Maddie Bella sent at 12/12/2019  2:45 PM CST -----  Contact: Pt  Type: Needs Medical Advice    Who Called:  PT  Best Call Back Number: [phone]  Additional Information: Requesting a call back regarding pt  Medication . Pt needs to know if she should stay on meds or not   Please Advise ---Thank you

## 2019-12-12 NOTE — TELEPHONE ENCOUNTER
Call placed to Ms. Velasquez, she stated that she was recently in the ER with rectal bleeding. She f/u with her PCP today, and was advised to find out if Dr. Avalos wants her to stop or continue her eliquis. I advised her that I would send a message to Eusebia Avalos, and give her a call back. She verbalized understanding. No further issues noted.

## 2019-12-13 NOTE — TELEPHONE ENCOUNTER
This was handled by Dr. Velazquez. Please make sure the patient is still not bleeding. Looks like she saw GI, but please check on who she sees. F/U 1 month. Should she develop rectal bleeding again, needs to go to ED. F/U with me 1 month.

## 2020-01-06 ENCOUNTER — HOSPITAL ENCOUNTER (EMERGENCY)
Facility: HOSPITAL | Age: 75
Discharge: HOME | End: 2020-01-06
Attending: EMERGENCY MEDICINE
Payer: MEDICARE

## 2020-01-06 VITALS
HEART RATE: 95 BPM | OXYGEN SATURATION: 100 % | RESPIRATION RATE: 16 BRPM | SYSTOLIC BLOOD PRESSURE: 154 MMHG | BODY MASS INDEX: 32.78 KG/M2 | WEIGHT: 197 LBS | TEMPERATURE: 98 F | DIASTOLIC BLOOD PRESSURE: 66 MMHG

## 2020-01-06 DIAGNOSIS — R05.9 COUGH: ICD-10-CM

## 2020-01-06 DIAGNOSIS — J20.9 ACUTE BRONCHITIS, UNSPECIFIED ORGANISM: Primary | ICD-10-CM

## 2020-01-06 LAB
ALBUMIN SERPL BCP-MCNC: 4.1 G/DL (ref 3.5–5.2)
ALP SERPL-CCNC: 65 U/L (ref 55–135)
ALT SERPL W/O P-5'-P-CCNC: 18 U/L (ref 10–44)
ANION GAP SERPL CALC-SCNC: 10 MMOL/L (ref 8–16)
APTT BLDCRRT: 28 SEC (ref 21–32)
AST SERPL-CCNC: 18 U/L (ref 10–40)
BASOPHILS # BLD AUTO: 0.02 K/UL (ref 0–0.2)
BASOPHILS NFR BLD: 0.3 % (ref 0–1.9)
BILIRUB SERPL-MCNC: 0.3 MG/DL (ref 0.1–1)
BUN SERPL-MCNC: 19 MG/DL (ref 8–23)
CALCIUM SERPL-MCNC: 10 MG/DL (ref 8.7–10.5)
CHLORIDE SERPL-SCNC: 104 MMOL/L (ref 95–110)
CO2 SERPL-SCNC: 32 MMOL/L (ref 23–29)
CREAT SERPL-MCNC: 0.9 MG/DL (ref 0.5–1.4)
DIFFERENTIAL METHOD: ABNORMAL
EOSINOPHIL # BLD AUTO: 0.2 K/UL (ref 0–0.5)
EOSINOPHIL NFR BLD: 3.1 % (ref 0–8)
ERYTHROCYTE [DISTWIDTH] IN BLOOD BY AUTOMATED COUNT: 13.4 % (ref 11.5–14.5)
EST. GFR  (AFRICAN AMERICAN): >60 ML/MIN/1.73 M^2
EST. GFR  (NON AFRICAN AMERICAN): >60 ML/MIN/1.73 M^2
GLUCOSE SERPL-MCNC: 134 MG/DL (ref 70–110)
HCT VFR BLD AUTO: 34.3 % (ref 37–48.5)
HGB BLD-MCNC: 10.4 G/DL (ref 12–16)
IMM GRANULOCYTES # BLD AUTO: 0.01 K/UL (ref 0–0.04)
INR PPP: 1.1 (ref 0.8–1.2)
LYMPHOCYTES # BLD AUTO: 1.6 K/UL (ref 1–4.8)
LYMPHOCYTES NFR BLD: 22.7 % (ref 18–48)
MCH RBC QN AUTO: 29.4 PG (ref 27–31)
MCHC RBC AUTO-ENTMCNC: 30.3 G/DL (ref 32–36)
MCV RBC AUTO: 97 FL (ref 82–98)
MONOCYTES # BLD AUTO: 0.6 K/UL (ref 0.3–1)
MONOCYTES NFR BLD: 8.6 % (ref 4–15)
NEUTROPHILS # BLD AUTO: 4.6 K/UL (ref 1.8–7.7)
NEUTROPHILS NFR BLD: 65.2 % (ref 38–73)
NRBC BLD-RTO: 0 /100 WBC
PLATELET # BLD AUTO: 217 K/UL (ref 150–350)
PMV BLD AUTO: 10.1 FL (ref 9.2–12.9)
POTASSIUM SERPL-SCNC: 4.4 MMOL/L (ref 3.5–5.1)
PROT SERPL-MCNC: 7.2 G/DL (ref 6–8.4)
PROTHROMBIN TIME: 11.3 SEC (ref 9–12.5)
RBC # BLD AUTO: 3.54 M/UL (ref 4–5.4)
SODIUM SERPL-SCNC: 146 MMOL/L (ref 136–145)
WBC # BLD AUTO: 6.99 K/UL (ref 3.9–12.7)

## 2020-01-06 PROCEDURE — 25000003 PHARM REV CODE 250: Mod: HCNC | Performed by: EMERGENCY MEDICINE

## 2020-01-06 PROCEDURE — 85730 THROMBOPLASTIN TIME PARTIAL: CPT | Mod: HCNC

## 2020-01-06 PROCEDURE — 99284 EMERGENCY DEPT VISIT MOD MDM: CPT | Mod: 25,HCNC

## 2020-01-06 PROCEDURE — 36415 COLL VENOUS BLD VENIPUNCTURE: CPT | Mod: HCNC

## 2020-01-06 PROCEDURE — 25000242 PHARM REV CODE 250 ALT 637 W/ HCPCS: Mod: HCNC | Performed by: EMERGENCY MEDICINE

## 2020-01-06 PROCEDURE — 80053 COMPREHEN METABOLIC PANEL: CPT | Mod: HCNC

## 2020-01-06 PROCEDURE — 85025 COMPLETE CBC W/AUTO DIFF WBC: CPT | Mod: HCNC

## 2020-01-06 PROCEDURE — 85610 PROTHROMBIN TIME: CPT | Mod: HCNC

## 2020-01-06 PROCEDURE — 94640 AIRWAY INHALATION TREATMENT: CPT | Mod: HCNC

## 2020-01-06 RX ORDER — IPRATROPIUM BROMIDE AND ALBUTEROL SULFATE 2.5; .5 MG/3ML; MG/3ML
3 SOLUTION RESPIRATORY (INHALATION)
Status: COMPLETED | OUTPATIENT
Start: 2020-01-06 | End: 2020-01-06

## 2020-01-06 RX ORDER — DOXYCYCLINE 100 MG/1
100 CAPSULE ORAL 2 TIMES DAILY
Qty: 20 CAPSULE | Refills: 0 | Status: SHIPPED | OUTPATIENT
Start: 2020-01-06 | End: 2020-01-16

## 2020-01-06 RX ORDER — ALBUTEROL SULFATE 90 UG/1
2 AEROSOL, METERED RESPIRATORY (INHALATION) EVERY 4 HOURS PRN
Qty: 1 EACH | Refills: 0 | Status: SHIPPED | OUTPATIENT
Start: 2020-01-06 | End: 2020-12-21 | Stop reason: CLARIF

## 2020-01-06 RX ORDER — DOXYCYCLINE HYCLATE 100 MG
100 TABLET ORAL
Status: COMPLETED | OUTPATIENT
Start: 2020-01-06 | End: 2020-01-06

## 2020-01-06 RX ADMIN — IPRATROPIUM BROMIDE AND ALBUTEROL SULFATE 3 ML: .5; 3 SOLUTION RESPIRATORY (INHALATION) at 10:01

## 2020-01-06 RX ADMIN — DOXYCYCLINE HYCLATE 100 MG: 100 TABLET, COATED ORAL at 12:01

## 2020-01-06 NOTE — ED PROVIDER NOTES
Encounter Date: 1/6/2020    SCRIBE #1 NOTE: IKaren, jenny scribing for, and in the presence of, Narendra Morales III, MD.       History     Chief Complaint   Patient presents with    Cough     Cough x 1 week. Reports coughing up blood last couple days       Time seen by provider: 10:35 AM on 01/06/2020    Eva Corona is a 74 y.o. female who presents to the ED with an onset of cough. Pt complains of blood-tinged productive cough and congestion that began a week ago. She denies fever, CP, N/v/d, HA, blood in urine or stool, or any other symptoms at this time. She denies hx of asthma, emphysema, or COPD. No pertinent cardiopulmonary PSHx. Pt is on home O2. No hx of tobacco use. Current medications include Eliquis for Afib. Drug allergies include Codeine, Steroidal neuromuscular blockers, and Morphine.    The history is provided by the patient.     Review of patient's allergies indicates:   Allergen Reactions    Codeine Nausea And Vomiting    Neuromuscular blockers, steroidal      Other reaction(s): Unknown    Morphine Nausea And Vomiting     Past Medical History:   Diagnosis Date    Anticoagulant long-term use     Atrial fibrillation     Cataract     CHF (congestive heart failure)     Diabetes mellitus     Encounter for blood transfusion     Hyperlipidemia     Hypertension     Hypothyroidism     Iron deficiency 4/29/2019    On home oxygen therapy     2L, nightly    S/P ablation of atrial fibrillation 7/22/2015    Shortness of breath on exertion     Thyroid disease     Type 2 diabetes mellitus      Past Surgical History:   Procedure Laterality Date    CARDIAC CATHETERIZATION Right     CARDIAC ELECTROPHYSIOLOGY MAPPING AND ABLATION      COLONOSCOPY      EYE SURGERY Bilateral     cataract, implants    HYSTERECTOMY      TVH, ovaries intact, benign     Family History   Problem Relation Age of Onset    Diabetes Mother     Heart disease Sister     No Known Problems Brother     No Known  Problems Daughter     No Known Problems Son     No Known Problems Brother     No Known Problems Brother     No Known Problems Son     Cancer Neg Hx      Social History     Tobacco Use    Smoking status: Never Smoker    Smokeless tobacco: Never Used   Substance Use Topics    Alcohol use: No     Alcohol/week: 0.0 standard drinks    Drug use: No     Review of Systems   Constitutional: Negative for fever.   HENT: Positive for congestion. Negative for ear pain.    Eyes: Negative for pain.   Respiratory: Positive for cough. Negative for shortness of breath.    Gastrointestinal: Negative for blood in stool, diarrhea, nausea and vomiting.   Genitourinary: Negative for flank pain and hematuria.   Musculoskeletal: Negative for gait problem.   Neurological: Negative for weakness and headaches.   Psychiatric/Behavioral: Negative for confusion.       Physical Exam     Initial Vitals [01/06/20 1015]   BP Pulse Resp Temp SpO2   (!) 154/66 103 20 98 °F (36.7 °C) 96 %      MAP       --         Physical Exam    Nursing note and vitals reviewed.  Constitutional: She appears well-developed and well-nourished.   HENT:   Head: Normocephalic and atraumatic.   Eyes: Conjunctivae are normal.   Neck: Normal range of motion. Neck supple.   Cardiovascular: Regular rhythm and normal heart sounds. Tachycardia present.  Exam reveals no gallop and no friction rub.    No murmur heard.  Pulmonary/Chest: Effort normal. No respiratory distress. She has wheezes. She has no rhonchi. She has no rales. She exhibits no tenderness.   Expiratory wheezes. Chest non-tender.   Abdominal: Soft. She exhibits no distension. There is no tenderness.   Musculoskeletal: Normal range of motion.   Neurological: She is alert and oriented to person, place, and time.   Skin: Skin is warm and dry. No erythema.   Psychiatric: She has a normal mood and affect.         ED Course   Procedures  Labs Reviewed   CBC W/ AUTO DIFFERENTIAL - Abnormal; Notable for the  following components:       Result Value    RBC 3.54 (*)     Hemoglobin 10.4 (*)     Hematocrit 34.3 (*)     Mean Corpuscular Hemoglobin Conc 30.3 (*)     All other components within normal limits   COMPREHENSIVE METABOLIC PANEL - Abnormal; Notable for the following components:    Sodium 146 (*)     CO2 32 (*)     Glucose 134 (*)     All other components within normal limits   APTT   PROTIME-INR          Imaging Results          X-Ray Chest PA And Lateral (Final result)  Result time 01/06/20 11:52:28    Final result by Violeta Acevedo MD (01/06/20 11:52:28)                 Impression:      NO ACUTE CARDIOPULMONARY DISEASE.      Electronically signed by: Violeta Acevedo MD  Date:    01/06/2020  Time:    11:52             Narrative:    EXAMINATION:  XR CHEST PA AND LATERAL    CLINICAL HISTORY:  Cough    TECHNIQUE:  PA and lateral views of the chest were performed.    COMPARISON:  12/11/2017    FINDINGS:  THE CARDIOMEDIASTINAL SILHOUETTE IS STABLE.  HEART APPEARS MILDLY ENLARGED.  LUNGS ARE CLEAR OF INFILTRATE.  THERE IS NO PLEURAL EFFUSION                                 Medical Decision Making:   History:   Old Medical Records: I decided to obtain old medical records.  Clinical Tests:   Lab Tests: Ordered and Reviewed  Radiological Study: Ordered and Reviewed  ED Management:  74-year-old female presents with a one-week history of a cough with a 2 day history of blood-tinged sputum with associated shortness of breath.  She is chronically anticoagulated with Eliquis.  She has no other evidence of bleeding.  Chest x-ray independently interpreted by me is normal with no evidence of mass or pneumonia.  She has symptomatic improvement with her shortness of breath and cough with bronchodilators suggesting bronchospasm.  She will be treated empirically with doxycycline for suspected bronchitis as well as given a prescription for an albuterol meter dose inhaler.  Pulmonary embolism is unlikely while anticoagulated.        APC / Resident Notes:   I, Dr. Narendra Morales III, personally performed the services described in this documentation. All medical record entries made by the scribe were at my direction and in my presence.  I have reviewed the chart and agree that the record reflects my personal performance and is accurate and complete       Scribe Attestation:   Scribe #1: I performed the above scribed service and the documentation accurately describes the services I performed. I attest to the accuracy of the note.                          Clinical Impression:       ICD-10-CM ICD-9-CM   1. Acute bronchitis, unspecified organism J20.9 466.0   2. Cough R05 786.2         Disposition:   Disposition: Discharged  Condition: Stable                     Narendra Morales III, MD  01/06/20 9166

## 2020-01-06 NOTE — PLAN OF CARE
01/06/20 1027   Patient Assessment/Suction   Level of Consciousness (AVPU) alert   All Lung Fields Breath Sounds coarse;diminished   PRE-TX-O2   SpO2 99 %   Pulse 92   Resp 16   Aerosol Therapy   $ Aerosol Therapy Charges Aerosol Treatment   Respiratory Treatment Status (SVN) given   Treatment Route (SVN) mask   Patient Position (SVN) HOB elevated   Post Treatment Assessment (SVN) breath sounds unchanged   Signs of Intolerance (SVN) none   Breath Sounds Post-Respiratory Treatment   Throughout All Fields Post-Treatment All Fields   Throughout All Fields Post-Treatment no change   Post-treatment Heart Rate (beats/min) 98   Post-treatment Resp Rate (breaths/min) 18

## 2020-01-06 NOTE — ED NOTES
"Pt in  with no s/sx of distress noted. VSS. ED work up in progress. Pt states breathing tx "opened up her lungs." No needs identified. Will monitor prn.  "

## 2020-01-13 ENCOUNTER — OFFICE VISIT (OUTPATIENT)
Dept: FAMILY MEDICINE | Facility: CLINIC | Age: 75
End: 2020-01-13
Payer: MEDICARE

## 2020-01-13 VITALS
OXYGEN SATURATION: 94 % | DIASTOLIC BLOOD PRESSURE: 64 MMHG | HEIGHT: 65 IN | SYSTOLIC BLOOD PRESSURE: 138 MMHG | BODY MASS INDEX: 32.4 KG/M2 | TEMPERATURE: 98 F | HEART RATE: 96 BPM | WEIGHT: 194.44 LBS

## 2020-01-13 DIAGNOSIS — E11.9 TYPE 2 DIABETES MELLITUS WITHOUT COMPLICATION, WITHOUT LONG-TERM CURRENT USE OF INSULIN: Primary | ICD-10-CM

## 2020-01-13 DIAGNOSIS — E03.9 HYPOTHYROIDISM, UNSPECIFIED TYPE: ICD-10-CM

## 2020-01-13 DIAGNOSIS — E11.59 HYPERTENSION ASSOCIATED WITH DIABETES: ICD-10-CM

## 2020-01-13 DIAGNOSIS — E78.5 HYPERLIPIDEMIA ASSOCIATED WITH TYPE 2 DIABETES MELLITUS: ICD-10-CM

## 2020-01-13 DIAGNOSIS — I15.2 HYPERTENSION ASSOCIATED WITH DIABETES: ICD-10-CM

## 2020-01-13 DIAGNOSIS — E11.69 HYPERLIPIDEMIA ASSOCIATED WITH TYPE 2 DIABETES MELLITUS: ICD-10-CM

## 2020-01-13 PROCEDURE — 3078F DIAST BP <80 MM HG: CPT | Mod: HCNC,CPTII,S$GLB, | Performed by: NURSE PRACTITIONER

## 2020-01-13 PROCEDURE — 3078F PR MOST RECENT DIASTOLIC BLOOD PRESSURE < 80 MM HG: ICD-10-PCS | Mod: HCNC,CPTII,S$GLB, | Performed by: NURSE PRACTITIONER

## 2020-01-13 PROCEDURE — 99999 PR PBB SHADOW E&M-EST. PATIENT-LVL III: ICD-10-PCS | Mod: PBBFAC,HCNC,, | Performed by: NURSE PRACTITIONER

## 2020-01-13 PROCEDURE — 3075F SYST BP GE 130 - 139MM HG: CPT | Mod: HCNC,CPTII,S$GLB, | Performed by: NURSE PRACTITIONER

## 2020-01-13 PROCEDURE — 1159F MED LIST DOCD IN RCRD: CPT | Mod: HCNC,S$GLB,, | Performed by: NURSE PRACTITIONER

## 2020-01-13 PROCEDURE — 99999 PR PBB SHADOW E&M-EST. PATIENT-LVL III: CPT | Mod: PBBFAC,HCNC,, | Performed by: NURSE PRACTITIONER

## 2020-01-13 PROCEDURE — 1159F PR MEDICATION LIST DOCUMENTED IN MEDICAL RECORD: ICD-10-PCS | Mod: HCNC,S$GLB,, | Performed by: NURSE PRACTITIONER

## 2020-01-13 PROCEDURE — 3044F HG A1C LEVEL LT 7.0%: CPT | Mod: HCNC,CPTII,S$GLB, | Performed by: NURSE PRACTITIONER

## 2020-01-13 PROCEDURE — 99214 OFFICE O/P EST MOD 30 MIN: CPT | Mod: HCNC,S$GLB,, | Performed by: NURSE PRACTITIONER

## 2020-01-13 PROCEDURE — 99214 PR OFFICE/OUTPT VISIT, EST, LEVL IV, 30-39 MIN: ICD-10-PCS | Mod: HCNC,S$GLB,, | Performed by: NURSE PRACTITIONER

## 2020-01-13 PROCEDURE — 1101F PR PT FALLS ASSESS DOC 0-1 FALLS W/OUT INJ PAST YR: ICD-10-PCS | Mod: HCNC,CPTII,S$GLB, | Performed by: NURSE PRACTITIONER

## 2020-01-13 PROCEDURE — 1101F PT FALLS ASSESS-DOCD LE1/YR: CPT | Mod: HCNC,CPTII,S$GLB, | Performed by: NURSE PRACTITIONER

## 2020-01-13 PROCEDURE — 3044F PR MOST RECENT HEMOGLOBIN A1C LEVEL <7.0%: ICD-10-PCS | Mod: HCNC,CPTII,S$GLB, | Performed by: NURSE PRACTITIONER

## 2020-01-13 PROCEDURE — 3075F PR MOST RECENT SYSTOLIC BLOOD PRESS GE 130-139MM HG: ICD-10-PCS | Mod: HCNC,CPTII,S$GLB, | Performed by: NURSE PRACTITIONER

## 2020-01-13 RX ORDER — ATORVASTATIN CALCIUM 10 MG/1
10 TABLET, FILM COATED ORAL DAILY
Qty: 90 TABLET | Refills: 3 | Status: SHIPPED | OUTPATIENT
Start: 2020-01-13 | End: 2021-03-17

## 2020-01-13 RX ORDER — LEVOTHYROXINE SODIUM 75 UG/1
TABLET ORAL
Qty: 90 TABLET | Refills: 3 | Status: SHIPPED | OUTPATIENT
Start: 2020-01-13 | End: 2021-01-22 | Stop reason: SDUPTHER

## 2020-01-13 RX ORDER — AMLODIPINE BESYLATE 5 MG/1
5 TABLET ORAL DAILY
Qty: 90 TABLET | Refills: 3 | Status: SHIPPED | OUTPATIENT
Start: 2020-01-13 | End: 2020-12-21 | Stop reason: CLARIF

## 2020-01-13 RX ORDER — BUMETANIDE 0.5 MG/1
2 TABLET ORAL DAILY
COMMUNITY
End: 2020-05-27

## 2020-01-13 RX ORDER — GLIMEPIRIDE 4 MG/1
TABLET ORAL
Qty: 180 TABLET | Refills: 1 | Status: SHIPPED | OUTPATIENT
Start: 2020-01-13 | End: 2020-09-15 | Stop reason: SDUPTHER

## 2020-01-13 NOTE — PROGRESS NOTES
Subjective:       Patient ID: Eva Corona is a 74 y.o. female.    Chief Complaint: Diabetes and Hypertension    Ms. Corona is a 74-year-old female patient who presents today for a chronic conditions follow-up.  Feeling well without complaint.  Was seen at the emergency room on January 6th and diagnosed with acute bronchitis.  She is taking doxycycline as prescribed and is feeling much better today.  Cough has completely resolved.  Denies shortness of breath or wheezing.  She is due for a DEXA scan, colonoscopy and flu vaccine.  She refuses all.  She wishes to schedule eye exam on her own.  Vitals stable today.  Requesting medication refills.  Due for updated labs.    Patient Active Problem List   Diagnosis    Hypothyroid    Hypertension associated with diabetes    Acute on chronic diastolic heart failure    Anticoagulant long-term use    PAF (paroxysmal atrial fibrillation)    Class 1 obesity due to excess calories with body mass index (BMI) of 33.0 to 33.9 in adult    Cardiomyopathy    RICHTER (dyspnea on exertion)    Persistent atrial fibrillation    Hyperlipidemia associated with type 2 diabetes mellitus, baseline     Retroperitoneal hematoma    S/P ablation of atrial fibrillation    Pulmonary hypertension    Obesity, Class I, BMI 30-34.9, today 32.3    Anemia    Type 2 diabetes mellitus with complication, without long-term current use of insulin    Abdominal aortic atherosclerosis    Nonischemic cardiomyopathy    Cardiac LV ejection fraction 10-20%    Abnormal ECG    Chronic systolic heart failure    Elevated brain natriuretic peptide (BNP) level    CKD (chronic kidney disease) stage 3, GFR 30-59 ml/min    Prerenal azotemia    Prolonged Q-T interval on ECG    On home oxygen therapy    Abdominal obesity       Current Outpatient Medications:     acetaminophen (TYLENOL) 325 MG tablet, Take 2 tablets (650 mg total) by mouth every 6 (six) hours as needed for Pain., Disp: , Rfl: 0     albuterol (PROVENTIL/VENTOLIN HFA) 90 mcg/actuation inhaler, Inhale 2 puffs into the lungs every 4 (four) hours as needed for Wheezing., Disp: 1 each, Rfl: 0    amLODIPine (NORVASC) 5 MG tablet, Take 1 tablet (5 mg total) by mouth once daily., Disp: 90 tablet, Rfl: 3    apixaban (ELIQUIS) 5 mg Tab, Take 1 tablet (5 mg total) by mouth 2 (two) times daily., Disp: 60 tablet, Rfl: 11    ascorbic acid (VITAMIN C) 1000 MG tablet, Take 1,000 mg by mouth once daily., Disp: , Rfl:     atorvastatin (LIPITOR) 10 MG tablet, Take 1 tablet (10 mg total) by mouth once daily., Disp: 90 tablet, Rfl: 3    bumetanide (BUMEX) 0.5 MG Tab, Take 2 mg by mouth once daily., Disp: , Rfl:     bumetanide (BUMEX) 1 MG tablet, TAKE ONE TABLET BY MOUTH EVERY DAY, Disp: 30 tablet, Rfl: 9    COCONUT OIL ORAL, Take by mouth., Disp: , Rfl:     diclofenac sodium (VOLTAREN) 1 % Gel, Apply 2 g topically 3 (three) times daily as needed., Disp: 100 g, Rfl: 11    doxycycline (VIBRAMYCIN) 100 MG Cap, Take 1 capsule (100 mg total) by mouth 2 (two) times daily. for 10 days, Disp: 20 capsule, Rfl: 0    ferrous sulfate (FEOSOL) 325 mg (65 mg iron) Tab tablet, Take 1 tablet (325 mg total) by mouth 2 (two) times daily. Try on empty stomach first, Disp: 60 tablet, Rfl: 11    fluticasone propionate (FLONASE) 50 mcg/actuation nasal spray, USE 2 SPRAYS IN EACH NOSTRIL EVERY EVENING, Disp: 48 g, Rfl: 3    glimepiride (AMARYL) 4 MG tablet, TAKE 2 TABLETS EVERY DAY WITH BREAKFAST, Disp: 180 tablet, Rfl: 1    lansoprazole (PREVACID SOLUTAB) 30 MG disintegrating tablet, Take 30 mg by mouth once daily., Disp: , Rfl:     levothyroxine (SYNTHROID) 75 MCG tablet, TAKE 1 TABLET EVERY DAY  BEFORE  BREAKFAST, Disp: 90 tablet, Rfl: 3    metFORMIN (GLUCOPHAGE) 500 MG tablet, Take 2 tablets (1,000 mg total) by mouth 2 (two) times daily with meals., Disp: 120 tablet, Rfl: 3    metoprolol succinate (TOPROL-XL) 200 MG 24 hr tablet, Take 1 tablet (200 mg total) by mouth  once daily., Disp: 90 tablet, Rfl: 3    MULTIVIT-MIN/FA/CA CARB/VIT K (ONE-A-DAY WOMEN'S 50+ ORAL), Take 1 tablet by mouth once daily. , Disp: , Rfl:     sacubitril-valsartan (ENTRESTO)  mg per tablet, Take 1 tablet by mouth 2 (two) times daily., Disp: 180 tablet, Rfl: 3    spironolactone (ALDACTONE) 25 MG tablet, TAKE ONE TABLET BY MOUTH EVERY DAY, Disp: 30 tablet, Rfl: 11    VITAMIN A ORAL, Take 1 tablet by mouth once daily. , Disp: , Rfl:     Lab Results   Component Value Date    WBC 6.99 01/06/2020    HGB 10.4 (L) 01/06/2020    HCT 34.3 (L) 01/06/2020     01/06/2020    CHOL 116 (L) 02/28/2019    TRIG 175 (H) 02/28/2019    HDL 28 (L) 02/28/2019    ALT 18 01/06/2020    AST 18 01/06/2020     (H) 01/06/2020    K 4.4 01/06/2020     01/06/2020    CREATININE 0.9 01/06/2020    BUN 19 01/06/2020    CO2 32 (H) 01/06/2020    TSH 1.788 02/28/2019    INR 1.1 01/06/2020    GLUF 152 (H) 12/01/2009    HGBA1C 6.9 (H) 02/28/2019    HGBA1C 6.9 (H) 02/28/2019     Review of Systems   Constitutional: Negative for activity change, appetite change, fatigue and fever.   HENT: Negative for congestion, sinus pressure, sinus pain and sore throat.    Respiratory: Negative for cough (improved since ED visit), chest tightness, shortness of breath and wheezing.    Cardiovascular: Negative for chest pain and palpitations.   Gastrointestinal: Negative for blood in stool, constipation, diarrhea, nausea and vomiting.   Neurological: Negative for dizziness, weakness, light-headedness and headaches.       Objective:      Physical Exam   Constitutional: She is oriented to person, place, and time. Vital signs are normal. She appears well-developed and well-nourished. No distress.   Cardiovascular: Normal rate, regular rhythm and normal heart sounds.   Pulmonary/Chest: Effort normal. She has wheezes (Faint wheezing left lung base).   Abdominal: Soft. Normal appearance.   Musculoskeletal: She exhibits no edema.    Neurological: She is alert and oriented to person, place, and time.   Skin: Skin is warm and dry.   Psychiatric: She has a normal mood and affect.   Nursing note and vitals reviewed.      Assessment:       1. Type 2 diabetes mellitus without complication, without long-term current use of insulin    2. Hypertension associated with diabetes    3. Hyperlipidemia associated with type 2 diabetes mellitus, baseline     4. Hypothyroidism, unspecified type        Plan:       Eva was seen today for diabetes and hypertension.    Diagnoses and all orders for this visit:    Type 2 diabetes mellitus without complication, without long-term current use of insulin  -     glimepiride (AMARYL) 4 MG tablet; TAKE 2 TABLETS EVERY DAY WITH BREAKFAST  -     Hemoglobin A1c; Future  Update labs  Stable condition.  Continue current medications.  Will adjust based on lab findings or if condition changes.    Hypertension associated with diabetes  -     amLODIPine (NORVASC) 5 MG tablet; Take 1 tablet (5 mg total) by mouth once daily.  Controlled on current medications    Hyperlipidemia associated with type 2 diabetes mellitus, baseline   -     atorvastatin (LIPITOR) 10 MG tablet; Take 1 tablet (10 mg total) by mouth once daily.  -     Lipid panel; Future  Stable condition.  Continue current medications.  Will adjust based on lab findings or if condition changes.    Hypothyroidism, unspecified type  -     levothyroxine (SYNTHROID) 75 MCG tablet; TAKE 1 TABLET EVERY DAY  BEFORE  BREAKFAST  -     TSH; Future  Stable condition.  Continue current medications.  Will adjust based on lab findings or if condition changes.    Fasting labs  F/U as scheduled with Dr. Silva

## 2020-01-19 RX ORDER — METFORMIN HYDROCHLORIDE 500 MG/1
TABLET ORAL
Qty: 120 TABLET | Refills: 3 | Status: SHIPPED | OUTPATIENT
Start: 2020-01-19 | End: 2020-05-18

## 2020-01-21 ENCOUNTER — LAB VISIT (OUTPATIENT)
Dept: LAB | Facility: HOSPITAL | Age: 75
End: 2020-01-21
Attending: NURSE PRACTITIONER
Payer: MEDICARE

## 2020-01-21 DIAGNOSIS — E11.9 TYPE 2 DIABETES MELLITUS WITHOUT COMPLICATION, WITHOUT LONG-TERM CURRENT USE OF INSULIN: ICD-10-CM

## 2020-01-21 DIAGNOSIS — E78.5 HYPERLIPIDEMIA ASSOCIATED WITH TYPE 2 DIABETES MELLITUS: ICD-10-CM

## 2020-01-21 DIAGNOSIS — E11.69 HYPERLIPIDEMIA ASSOCIATED WITH TYPE 2 DIABETES MELLITUS: ICD-10-CM

## 2020-01-21 DIAGNOSIS — E03.9 HYPOTHYROIDISM, UNSPECIFIED TYPE: ICD-10-CM

## 2020-01-21 LAB
CHOLEST SERPL-MCNC: 119 MG/DL (ref 120–199)
CHOLEST/HDLC SERPL: 4.3 {RATIO} (ref 2–5)
ESTIMATED AVG GLUCOSE: 140 MG/DL (ref 68–131)
HBA1C MFR BLD HPLC: 6.5 % (ref 4–5.6)
HDLC SERPL-MCNC: 28 MG/DL (ref 40–75)
HDLC SERPL: 23.5 % (ref 20–50)
LDLC SERPL CALC-MCNC: 64.4 MG/DL (ref 63–159)
NONHDLC SERPL-MCNC: 91 MG/DL
TRIGL SERPL-MCNC: 133 MG/DL (ref 30–150)
TSH SERPL DL<=0.005 MIU/L-ACNC: 3.57 UIU/ML (ref 0.4–4)

## 2020-01-21 PROCEDURE — 83036 HEMOGLOBIN GLYCOSYLATED A1C: CPT | Mod: HCNC

## 2020-01-21 PROCEDURE — 36415 COLL VENOUS BLD VENIPUNCTURE: CPT | Mod: HCNC,PO

## 2020-01-21 PROCEDURE — 84443 ASSAY THYROID STIM HORMONE: CPT | Mod: HCNC

## 2020-01-21 PROCEDURE — 80061 LIPID PANEL: CPT | Mod: HCNC

## 2020-04-21 ENCOUNTER — TELEPHONE (OUTPATIENT)
Dept: FAMILY MEDICINE | Facility: CLINIC | Age: 75
End: 2020-04-21

## 2020-04-21 NOTE — TELEPHONE ENCOUNTER
----- Message from Carmina Bell sent at 4/21/2020  3:43 PM CDT -----  Contact: self  Patient is scheduled to have labs tomorrow 4/22 and she wants to reschedule to middle of May.  Is this going to mess her up for any of her medicines.  Call patient back to advise at [phone] (home).  Thanks

## 2020-04-21 NOTE — TELEPHONE ENCOUNTER
Pt rescheduled fasting labs d/t COVID-19 concerns. Pt rescheduled for Monday, 05/11/2020 at 1200 for fasting labs. Pt verbalized understanding.

## 2020-05-05 ENCOUNTER — TELEPHONE (OUTPATIENT)
Dept: CARDIOLOGY | Facility: CLINIC | Age: 75
End: 2020-05-05

## 2020-05-05 NOTE — TELEPHONE ENCOUNTER
Spoke with Mariann with [facility] in regards to Mrs Taylor cardiac clearance, notified them that Dr Avalos is off today and will be in tomorrow afternoon review and assess patient for clearance.

## 2020-05-05 NOTE — TELEPHONE ENCOUNTER
----- Message from Donovan Monahan sent at 5/5/2020 12:50 PM CDT -----  Contact: Mini with [facility] [phone]  Fax  [phone]  Type: Needs Medical Advice    Who Called:  Mini with [facility] [phone]  Fax  [phone]      Additional Information:         Jomar I have Mini with [facility] [phone] on MRN [id number] vEa Corona for medical clearance sent over on 05-04-20. Ptnt has procedure scheduled for 05-06-20 at 10 AM needs clearance.  Please call before 3 PM.        The office closes for at 3 PM. Needs the clearance as soon as possible.

## 2020-05-18 RX ORDER — METFORMIN HYDROCHLORIDE 500 MG/1
TABLET ORAL
Qty: 360 TABLET | Refills: 3 | Status: SHIPPED | OUTPATIENT
Start: 2020-05-18 | End: 2020-09-29

## 2020-05-20 ENCOUNTER — TELEPHONE (OUTPATIENT)
Dept: FAMILY MEDICINE | Facility: CLINIC | Age: 75
End: 2020-05-20

## 2020-05-20 NOTE — TELEPHONE ENCOUNTER
----- Message from Noreen Pan sent at 5/20/2020  1:18 PM CDT -----  Contact: Sister  Type: Needs Medical Advice  Who Called:  Nancie  Huber Call Back Number: [phone] (home)   Additional Information: The pt would like a call back to get an appt after labs refused the next avail in Sept and is asking to the Dr not a NP per her sister please call to advise an appt sooner.

## 2020-05-21 ENCOUNTER — LAB VISIT (OUTPATIENT)
Dept: LAB | Facility: HOSPITAL | Age: 75
End: 2020-05-21
Attending: FAMILY MEDICINE
Payer: MEDICARE

## 2020-05-21 DIAGNOSIS — Z98.890 S/P ABLATION OF ATRIAL FIBRILLATION: ICD-10-CM

## 2020-05-21 DIAGNOSIS — R09.89 CARDIAC LV EJECTION FRACTION 10-20%: ICD-10-CM

## 2020-05-21 DIAGNOSIS — I48.19 PERSISTENT ATRIAL FIBRILLATION: ICD-10-CM

## 2020-05-21 DIAGNOSIS — Z79.01 LONG TERM CURRENT USE OF ANTICOAGULANT THERAPY: ICD-10-CM

## 2020-05-21 DIAGNOSIS — I50.22 CHRONIC SYSTOLIC HEART FAILURE: ICD-10-CM

## 2020-05-21 DIAGNOSIS — Z86.79 S/P ABLATION OF ATRIAL FIBRILLATION: ICD-10-CM

## 2020-05-21 LAB
INR PPP: 1.1 (ref 0.8–1.2)
PROTHROMBIN TIME: 11 SEC (ref 9–12.5)

## 2020-05-21 PROCEDURE — 85610 PROTHROMBIN TIME: CPT | Mod: HCNC

## 2020-05-21 PROCEDURE — 36415 COLL VENOUS BLD VENIPUNCTURE: CPT | Mod: HCNC,PO

## 2020-05-21 RX ORDER — METOPROLOL SUCCINATE 200 MG/1
200 TABLET, EXTENDED RELEASE ORAL DAILY
Qty: 90 TABLET | Refills: 1 | Status: SHIPPED | OUTPATIENT
Start: 2020-05-21 | End: 2020-05-27

## 2020-05-22 RX ORDER — SPIRONOLACTONE 25 MG/1
25 TABLET ORAL DAILY
Qty: 90 TABLET | Refills: 1 | Status: SHIPPED | OUTPATIENT
Start: 2020-05-22 | End: 2020-06-02 | Stop reason: SDUPTHER

## 2020-05-25 ENCOUNTER — TELEPHONE (OUTPATIENT)
Dept: FAMILY MEDICINE | Facility: CLINIC | Age: 75
End: 2020-05-25

## 2020-05-25 NOTE — TELEPHONE ENCOUNTER
----- Message from Arely Pack sent at 5/25/2020  3:25 PM CDT -----  Contact: patient  Type:  Sooner Apoointment Request    Caller is requesting a sooner appointment.  Caller declined first available appointment listed below.  Caller will not accept being placed on the waitlist and is requesting a message be sent to doctor.    Name of Caller:  Patient  When is the first available appointment?  9/23  Symptoms:  Needs to reschedule 6/1 appointment  Best Call Back Number:    Additional Information:  Please advise-thank you

## 2020-05-27 DIAGNOSIS — I48.19 PERSISTENT ATRIAL FIBRILLATION: ICD-10-CM

## 2020-05-27 DIAGNOSIS — Z98.890 S/P ABLATION OF ATRIAL FIBRILLATION: ICD-10-CM

## 2020-05-27 DIAGNOSIS — I50.22 CHRONIC SYSTOLIC HEART FAILURE: ICD-10-CM

## 2020-05-27 DIAGNOSIS — Z86.79 S/P ABLATION OF ATRIAL FIBRILLATION: ICD-10-CM

## 2020-05-27 DIAGNOSIS — R09.89 CARDIAC LV EJECTION FRACTION 10-20%: ICD-10-CM

## 2020-05-27 RX ORDER — BUMETANIDE 0.5 MG/1
0.5 TABLET ORAL DAILY
Qty: 90 TABLET | Refills: 1 | Status: SHIPPED | OUTPATIENT
Start: 2020-05-27 | End: 2020-12-03

## 2020-05-27 RX ORDER — METOPROLOL SUCCINATE 200 MG/1
200 TABLET, EXTENDED RELEASE ORAL DAILY
Qty: 90 TABLET | Refills: 1 | Status: SHIPPED | OUTPATIENT
Start: 2020-05-27 | End: 2020-12-29 | Stop reason: CLARIF

## 2020-06-02 RX ORDER — SPIRONOLACTONE 25 MG/1
25 TABLET ORAL DAILY
Qty: 90 TABLET | Refills: 2 | Status: SHIPPED | OUTPATIENT
Start: 2020-06-02 | End: 2021-02-17

## 2020-06-02 NOTE — TELEPHONE ENCOUNTER
LOV 11/19/19  FU 05/2020    Needs medication sent [facility] please.

## 2020-06-18 ENCOUNTER — PATIENT OUTREACH (OUTPATIENT)
Dept: ADMINISTRATIVE | Facility: HOSPITAL | Age: 75
End: 2020-06-18

## 2020-06-18 DIAGNOSIS — Z12.31 SCREENING MAMMOGRAM, ENCOUNTER FOR: Primary | ICD-10-CM

## 2020-06-18 NOTE — PROGRESS NOTES
Chart review completed 06/18/2020.  Care Everywhere updates requested and reviewed.  Immunizations reconciled. Media reviewed.     DIS, Lab henri, and quest reviewed    WOG orders placed. MAMMO    PORTAL MESSAGE SENT W/ SCHEDULING LINK.  Y

## 2020-06-21 ENCOUNTER — PATIENT OUTREACH (OUTPATIENT)
Dept: ADMINISTRATIVE | Facility: OTHER | Age: 75
End: 2020-06-21

## 2020-06-21 DIAGNOSIS — Z12.11 ENCOUNTER FOR FIT (FECAL IMMUNOCHEMICAL TEST) SCREENING: ICD-10-CM

## 2020-06-21 DIAGNOSIS — E11.8 TYPE 2 DIABETES MELLITUS WITH COMPLICATION, WITHOUT LONG-TERM CURRENT USE OF INSULIN: Primary | ICD-10-CM

## 2020-06-21 NOTE — PROGRESS NOTES
Patient's chart was reviewed.   Requested updates within Care Everywhere.  Colon hx searched and fit kit ordered   Health Maintenance was updated.

## 2020-06-22 ENCOUNTER — OFFICE VISIT (OUTPATIENT)
Dept: CARDIOLOGY | Facility: CLINIC | Age: 75
End: 2020-06-22
Payer: MEDICARE

## 2020-06-22 VITALS
WEIGHT: 191.56 LBS | DIASTOLIC BLOOD PRESSURE: 65 MMHG | OXYGEN SATURATION: 89 % | HEART RATE: 91 BPM | HEIGHT: 65 IN | SYSTOLIC BLOOD PRESSURE: 134 MMHG | BODY MASS INDEX: 31.92 KG/M2

## 2020-06-22 DIAGNOSIS — E65 ABDOMINAL OBESITY: ICD-10-CM

## 2020-06-22 DIAGNOSIS — I42.8 OTHER CARDIOMYOPATHY: ICD-10-CM

## 2020-06-22 DIAGNOSIS — N18.30 CKD (CHRONIC KIDNEY DISEASE) STAGE 3, GFR 30-59 ML/MIN: ICD-10-CM

## 2020-06-22 DIAGNOSIS — Z86.79 S/P ABLATION OF ATRIAL FIBRILLATION: ICD-10-CM

## 2020-06-22 DIAGNOSIS — E78.5 HYPERLIPIDEMIA ASSOCIATED WITH TYPE 2 DIABETES MELLITUS: ICD-10-CM

## 2020-06-22 DIAGNOSIS — Z98.890 S/P ABLATION OF ATRIAL FIBRILLATION: ICD-10-CM

## 2020-06-22 DIAGNOSIS — I48.0 PAF (PAROXYSMAL ATRIAL FIBRILLATION): Primary | ICD-10-CM

## 2020-06-22 DIAGNOSIS — E11.69 HYPERLIPIDEMIA ASSOCIATED WITH TYPE 2 DIABETES MELLITUS: ICD-10-CM

## 2020-06-22 DIAGNOSIS — Z99.81 ON HOME OXYGEN THERAPY: ICD-10-CM

## 2020-06-22 DIAGNOSIS — D50.0 IRON DEFICIENCY ANEMIA DUE TO CHRONIC BLOOD LOSS: ICD-10-CM

## 2020-06-22 DIAGNOSIS — R79.89 ELEVATED BRAIN NATRIURETIC PEPTIDE (BNP) LEVEL: ICD-10-CM

## 2020-06-22 DIAGNOSIS — Z79.01 ANTICOAGULANT LONG-TERM USE: ICD-10-CM

## 2020-06-22 PROCEDURE — 1100F PR PT FALLS ASSESS DOC 2+ FALLS/FALL W/INJURY/YR: ICD-10-PCS | Mod: HCNC,CPTII,S$GLB, | Performed by: INTERNAL MEDICINE

## 2020-06-22 PROCEDURE — 99214 PR OFFICE/OUTPT VISIT, EST, LEVL IV, 30-39 MIN: ICD-10-PCS | Mod: HCNC,S$GLB,, | Performed by: INTERNAL MEDICINE

## 2020-06-22 PROCEDURE — 99999 PR PBB SHADOW E&M-EST. PATIENT-LVL IV: CPT | Mod: PBBFAC,HCNC,, | Performed by: INTERNAL MEDICINE

## 2020-06-22 PROCEDURE — 93000 ELECTROCARDIOGRAM COMPLETE: CPT | Mod: HCNC,S$GLB,, | Performed by: INTERNAL MEDICINE

## 2020-06-22 PROCEDURE — 99214 OFFICE O/P EST MOD 30 MIN: CPT | Mod: HCNC,S$GLB,, | Performed by: INTERNAL MEDICINE

## 2020-06-22 PROCEDURE — 3288F PR FALLS RISK ASSESSMENT DOCUMENTED: ICD-10-PCS | Mod: HCNC,CPTII,S$GLB, | Performed by: INTERNAL MEDICINE

## 2020-06-22 PROCEDURE — 3044F PR MOST RECENT HEMOGLOBIN A1C LEVEL <7.0%: ICD-10-PCS | Mod: HCNC,CPTII,S$GLB, | Performed by: INTERNAL MEDICINE

## 2020-06-22 PROCEDURE — 1159F PR MEDICATION LIST DOCUMENTED IN MEDICAL RECORD: ICD-10-PCS | Mod: HCNC,S$GLB,, | Performed by: INTERNAL MEDICINE

## 2020-06-22 PROCEDURE — 1126F AMNT PAIN NOTED NONE PRSNT: CPT | Mod: HCNC,S$GLB,, | Performed by: INTERNAL MEDICINE

## 2020-06-22 PROCEDURE — 3078F PR MOST RECENT DIASTOLIC BLOOD PRESSURE < 80 MM HG: ICD-10-PCS | Mod: HCNC,CPTII,S$GLB, | Performed by: INTERNAL MEDICINE

## 2020-06-22 PROCEDURE — 3078F DIAST BP <80 MM HG: CPT | Mod: HCNC,CPTII,S$GLB, | Performed by: INTERNAL MEDICINE

## 2020-06-22 PROCEDURE — 3075F PR MOST RECENT SYSTOLIC BLOOD PRESS GE 130-139MM HG: ICD-10-PCS | Mod: HCNC,CPTII,S$GLB, | Performed by: INTERNAL MEDICINE

## 2020-06-22 PROCEDURE — 3075F SYST BP GE 130 - 139MM HG: CPT | Mod: HCNC,CPTII,S$GLB, | Performed by: INTERNAL MEDICINE

## 2020-06-22 PROCEDURE — 1159F MED LIST DOCD IN RCRD: CPT | Mod: HCNC,S$GLB,, | Performed by: INTERNAL MEDICINE

## 2020-06-22 PROCEDURE — 1100F PTFALLS ASSESS-DOCD GE2>/YR: CPT | Mod: HCNC,CPTII,S$GLB, | Performed by: INTERNAL MEDICINE

## 2020-06-22 PROCEDURE — 3288F FALL RISK ASSESSMENT DOCD: CPT | Mod: HCNC,CPTII,S$GLB, | Performed by: INTERNAL MEDICINE

## 2020-06-22 PROCEDURE — 1126F PR PAIN SEVERITY QUANTIFIED, NO PAIN PRESENT: ICD-10-PCS | Mod: HCNC,S$GLB,, | Performed by: INTERNAL MEDICINE

## 2020-06-22 PROCEDURE — 93000 EKG 12-LEAD: ICD-10-PCS | Mod: HCNC,S$GLB,, | Performed by: INTERNAL MEDICINE

## 2020-06-22 PROCEDURE — 3044F HG A1C LEVEL LT 7.0%: CPT | Mod: HCNC,CPTII,S$GLB, | Performed by: INTERNAL MEDICINE

## 2020-06-22 PROCEDURE — 99999 PR PBB SHADOW E&M-EST. PATIENT-LVL IV: ICD-10-PCS | Mod: PBBFAC,HCNC,, | Performed by: INTERNAL MEDICINE

## 2020-06-22 PROCEDURE — 99499 UNLISTED E&M SERVICE: CPT | Mod: HCNC,S$GLB,, | Performed by: INTERNAL MEDICINE

## 2020-06-22 PROCEDURE — 99499 RISK ADDL DX/OHS AUDIT: ICD-10-PCS | Mod: HCNC,S$GLB,, | Performed by: INTERNAL MEDICINE

## 2020-06-22 NOTE — PROGRESS NOTES
Subjective:    Patient ID:  Eva Corona is a 74 y.o. female who presents for evaluation of 6 month f/u  For HFrEF on BB, HR 91, PAF on NOAC, T2DM on glimepiride   PCP and care for T2DM: Dr. Silva  Prior cardiologist: Dr. 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, lots of chemical    Health literacy: medium   Vaccinations: up-to-date  Activities: do housework no SOB with vacuuming after 10-15 minutes, started on exercise machine at home, not doing much, just laziness  Nicotine: never   Alcohol: none  Illicit drugs: none  Cardiac symptoms: none  Home BP:  to 130  Medication compliance: yes  Diet: regular, no salt  Caffeine: 2 cpd  Labs: 2/2019, LDL 53, on Rx, CMP (, BUN 33, eGFR 56), CBC (Hgb 10.7), iron sat only 16%, ferritin 54, normal TSH, A1C 6.5%, urine negative for microalbuminuria,   Lab Results   Component Value Date    TSH 3.567 01/21/2020        Lab Results   Component Value Date    HGBA1C 6.5 (H) 01/21/2020       Lab Results   Component Value Date    WBC 6.99 01/06/2020    HGB 10.4 (L) 01/06/2020    HCT 34.3 (L) 01/06/2020    MCV 97 01/06/2020     01/06/2020       CMP  Sodium   Date Value Ref Range Status   01/06/2020 146 (H) 136 - 145 mmol/L Final     Potassium   Date Value Ref Range Status   01/06/2020 4.4 3.5 - 5.1 mmol/L Final     Chloride   Date Value Ref Range Status   01/06/2020 104 95 - 110 mmol/L Final     CO2   Date Value Ref Range Status   01/06/2020 32 (H) 23 - 29 mmol/L Final     Glucose   Date Value Ref Range Status   01/06/2020 134 (H) 70 - 110 mg/dL Final     BUN, Bld   Date Value Ref Range Status   01/06/2020 19 8 - 23 mg/dL Final     Creatinine   Date Value Ref Range Status   01/06/2020 0.9 0.5 - 1.4 mg/dL Final     Calcium   Date Value Ref Range Status   01/06/2020 10.0 8.7 - 10.5 mg/dL Final     Total Protein   Date Value Ref Range Status    01/06/2020 7.2 6.0 - 8.4 g/dL Final     Albumin   Date Value Ref Range Status   01/06/2020 4.1 3.5 - 5.2 g/dL Final     Total Bilirubin   Date Value Ref Range Status   01/06/2020 0.3 0.1 - 1.0 mg/dL Final     Comment:     For infants and newborns, interpretation of results should be based  on gestational age, weight and in agreement with clinical  observations.  Premature Infant recommended reference ranges:  Up to 24 hours.............<8.0 mg/dL  Up to 48 hours............<12.0 mg/dL  3-5 days..................<15.0 mg/dL  6-29 days.................<15.0 mg/dL       Alkaline Phosphatase   Date Value Ref Range Status   01/06/2020 65 55 - 135 U/L Final     AST   Date Value Ref Range Status   01/06/2020 18 10 - 40 U/L Final     ALT   Date Value Ref Range Status   01/06/2020 18 10 - 44 U/L Final     Anion Gap   Date Value Ref Range Status   01/06/2020 10 8 - 16 mmol/L Final     eGFR if    Date Value Ref Range Status   01/06/2020 >60 >60 mL/min/1.73 m^2 Final     eGFR if non    Date Value Ref Range Status   01/06/2020 >60 >60 mL/min/1.73 m^2 Final     Comment:     Calculation used to obtain the estimated glomerular filtration  rate (eGFR) is the CKD-EPI equation.        @labrcntip(troponini)@    BNP   Date Value Ref Range Status   02/28/2019 269 (H) 0 - 99 pg/mL Final     Comment:     Values of less than 100 pg/ml are consistent with non-CHF populations.   }   Lab Results   Component Value Date    CHOL 119 (L) 01/21/2020    CHOL 116 (L) 02/28/2019    CHOL 139 07/03/2018     Lab Results   Component Value Date    HDL 28 (L) 01/21/2020    HDL 28 (L) 02/28/2019    HDL 29 (L) 07/03/2018     Lab Results   Component Value Date    LDLCALC 64.4 01/21/2020    LDLCALC 53.0 (L) 02/28/2019    LDLCALC 71.0 07/03/2018     Lab Results   Component Value Date    TRIG 133 01/21/2020    TRIG 175 (H) 02/28/2019    TRIG 195 (H) 07/03/2018     Lab Results   Component Value Date    CHOLHDL 23.5 01/21/2020     "CHOLHDL 24.1 02/28/2019    CHOLHDL 20.9 07/03/2018        Last Echo: 8/2018  Last stress test: 1/2014 Lexiscan  Cardiovascular angiogram: 6/2015  ECG: NSR, 93, ILBBB, STTA, prolong QTc 490 msec.  Fundoscopic exam: appointment in 6/2020, no retinopathy    In 4/2019:  WF here for 6 months review. Felt better after 3 days of Entresto Rx. Medication review as noted.     Dr. London noted: 72 y.o. female with a past medical history of heart failure with reduced ejection fraction, atrial fibrillation, diabetes    Patient is feeling better.  Her echocardiography as noted below shows improvement in her ejection fraction.  She denies any orthopnea PND.  She is currently in NYHA class 1 heart failure.    patient is currently stable from a cardiovascular perspective.  Her ejection fraction remains low but is significantly improved from her prior ejection fraction.  She remains on anticoagulation given her persistent atrial fibrillation and risk of stroke.  She in addition she remains on a beta-blocker and an aldosterone inhibitor."    Echo 8/2018 Left ventricle ejection fraction is moderately decreased at 30%  Grade I (mild) left ventricular diastolic dysfunction consistent with impaired relaxation.  LA pressure is normal.  Mitral valve shows mild regurgitation.  Left ventricle shows mild concentric hypertrophy.  RV systolic function is normal.  Left atrium is moderately dilated.  Right atrium is moderately dilated.  Tricuspid valve shows mild regurgitation.    [facility] 6/2015  RA:  (14)  RV: 60  RVEDP: 12  PA: 63/25  PW:  (31)  CO_THERM: 4.7     SUMMARY/POST-OPERATIVE DIAGNOSIS:    1. Increased right heart pressures.  2. Increased mean PCWP with large "v" waves at 55mmHg    E. RECOMMENDATIONS:    1. Maximize medical management.      In 5/2019, feeling better, much improved after 3 days on Entresto, sleeping better, more energy and less fatigue and SOB.     In 11/2019, here for 6-months review. Feeling good but quite sedentary. Labs " not completed. Remains on glimepiride.     HPI comments: in 6/2020, no problem from cardiac matters but troubled by iron supplement creating severe constipation.No heart worries.    Review of Systems   Constitution: Positive for weight loss (down 4 lbs since 11/2019). Negative for diaphoresis, fever, malaise/fatigue, night sweats and weight gain.   HENT: Positive for congestion. Negative for nosebleeds and tinnitus.    Eyes: Negative for visual disturbance.   Cardiovascular: Positive for dyspnea on exertion. Negative for chest pain, claudication, cyanosis, irregular heartbeat, leg swelling, near-syncope, orthopnea, palpitations and paroxysmal nocturnal dyspnea.   Respiratory: Negative for cough, shortness of breath, sleep disturbances due to breathing, snoring and wheezing.         Hessel score 2, unable to complete sleep study in the past.   Endocrine: Negative for polydipsia and polyuria.   Hematologic/Lymphatic: Does not bruise/bleed easily.   Skin: Negative for color change, flushing, nail changes, poor wound healing and suspicious lesions.   Musculoskeletal: Positive for back pain. Negative for arthritis, falls, gout, joint pain, joint swelling, muscle cramps, muscle weakness and myalgias.   Gastrointestinal: Positive for bloating. Negative for heartburn, hematemesis, hematochezia, melena and nausea.   Genitourinary: Positive for bladder incontinence.   Neurological: Negative for disturbances in coordination, excessive daytime sleepiness, dizziness, focal weakness, headaches, light-headedness, loss of balance, numbness, vertigo and weakness.   Psychiatric/Behavioral: Negative for depression and substance abuse. The patient does not have insomnia and is not nervous/anxious.         Objective:    Physical Exam   Constitutional: She is oriented to person, place, and time. She appears well-developed and well-nourished.   HENT:   Head: Normocephalic.   Eyes: Pupils are equal, round, and reactive to light.  "Conjunctivae and EOM are normal.   Neck: Normal range of motion. Neck supple. No JVD present. No thyromegaly present.   Circumference 16.75"   Cardiovascular: Normal rate, regular rhythm and intact distal pulses. Exam reveals no gallop and no friction rub.   Murmur heard.   Harsh early systolic murmur is present with a grade of 2/6 at the upper right sternal border radiating to the neck.  Pulses:       Carotid pulses are 2+ on the right side and 2+ on the left side.       Radial pulses are 2+ on the right side and 2+ on the left side.        Femoral pulses are 2+ on the right side and 2+ on the left side.       Popliteal pulses are 2+ on the right side and 2+ on the left side.        Dorsalis pedis pulses are 2+ on the right side and 2+ on the left side.        Posterior tibial pulses are 2+ on the right side and 2+ on the left side.   Pulmonary/Chest: Effort normal. She has wheezes. She has no rales. She exhibits no tenderness.   Abdominal: Soft. Bowel sounds are normal. There is no abdominal tenderness.   Waist 47.25" increased to 48"   Musculoskeletal: Normal range of motion.         General: No edema.   Lymphadenopathy:     She has no cervical adenopathy.   Neurological: She is alert and oriented to person, place, and time.   Skin: Skin is warm and dry. No rash noted.   Bilateral stasis dermatitis, allergic to Band-Aid.          Assessment:       1. PAF (paroxysmal atrial fibrillation)    2. Abdominal obesity    3. Anticoagulant long-term use    4. Other cardiomyopathy    5. CKD (chronic kidney disease) stage 3, GFR 30-59 ml/min    6. Elevated brain natriuretic peptide (BNP) level    7. Hyperlipidemia associated with type 2 diabetes mellitus, baseline     8. Iron deficiency anemia due to chronic blood loss    9. On home oxygen therapy, nightly    10. S/P ablation of atrial fibrillation         Plan:       Eva was seen today for 6 month f/u.    Diagnoses and all orders for this visit:    PAF (paroxysmal " atrial fibrillation)    Abdominal obesity    Anticoagulant long-term use  -     CBC auto differential; Future    Other cardiomyopathy    CKD (chronic kidney disease) stage 3, GFR 30-59 ml/min    Elevated brain natriuretic peptide (BNP) level    Hyperlipidemia associated with type 2 diabetes mellitus, baseline     Iron deficiency anemia due to chronic blood loss  -     CBC auto differential; Future    On home oxygen therapy, nightly    S/P ablation of atrial fibrillation    - All medical issues reviewed, continue current Rx.  - Consider hematology consult if needed after CBC  - Would like to switch off glimepiride to more cardiac friendly medication: Victoza, Jardiance, will refer back to PCP, Dr. Silva / Ms. Terry NP  - CV status stable, all medications reviewed, patient acknowledge good understanding.  - Recommend healthy living: healthy diet and regular exercise aiming for fitness, and weight control   - Instruction for Mediterranean diet and heart healthy exercise given.  - Check home blood pressure, 2 days weekly, do 2 readings within 5 minutes in AM and PM, keep log for review.  - Weigh twice weekly, try to lose 1-2 lbs per week. Target weight loss of 5%-10%.  - Have to do some abdominal exercise to rid belly fat  - Highly recommend 30 minutes of exercise / activities daily, can have Sunday off, with 2-3 sessions of muscle strengthening weekly. A  would be very helpful.  - Recommend at least annual cardiovascular evaluation in view of patient's significant risk factors. Patient's preference  - Phone review / encourage use of [facility]      Greater than 50% of the time was spent in counseling and coordination of care. The above assessment and plan have been discussed at length. Labs and procedure over the last 6 months reviewed. Problem List updated. Asked to bring in all active medications / pills bottles with next visit.

## 2020-06-22 NOTE — PROGRESS NOTES
" Patient ID:  Eva Corona is a 74 y.o. female who presents for {Misc; evaluation/follow-up:16809::"follow-up"} of 6 month f/u      Health literacy: {DESC; LOW/MEDIUM/HIGH:77434} medium  Activities: none  Nicotine: never  Alcohol: none  Illicit drugs: none  Cardiac symptoms: none  Home BP: do not check  Medication compliance: yes  Diet: regular, diabetic, Mediterranean regular, no salt  Caffeine: 2 cpd  Labs:   Last Echo: 2018  Last stress test: 2014  Cardiovascular angiogram:   EC2019  Fundoscopic exam:     EPWORTH SLEEPINESS SCALE 10/10/2016   Sitting and reading 0   Watching TV 0   Sitting, inactive in a public place (e.g. a theatre or a meeting) 0   As a passenger in a car for an hour without a break 0   Lying down to rest in the afternoon when circumstances permit 0   Sitting and talking to someone 0   Sitting quietly after a lunch without alcohol 0   In a car, while stopped for a few minutes in traffic 0   Total score 0         HPI    ROS     Objective:    Physical Exam      Assessment:       No diagnosis found.     Plan:         There are no diagnoses linked to this encounter.              "

## 2020-06-24 DIAGNOSIS — I48.0 PAF (PAROXYSMAL ATRIAL FIBRILLATION): Primary | ICD-10-CM

## 2020-07-14 DIAGNOSIS — R05.9 COUGH: Primary | ICD-10-CM

## 2020-07-15 ENCOUNTER — HOSPITAL ENCOUNTER (OUTPATIENT)
Dept: RADIOLOGY | Facility: HOSPITAL | Age: 75
Discharge: HOME | End: 2020-07-15
Attending: OTOLARYNGOLOGY
Payer: MEDICARE

## 2020-07-15 DIAGNOSIS — R05.9 COUGH: ICD-10-CM

## 2020-07-15 PROCEDURE — 71046 XR CHEST PA AND LATERAL: ICD-10-PCS | Mod: 26,HCNC,, | Performed by: RADIOLOGY

## 2020-07-15 PROCEDURE — 71046 X-RAY EXAM CHEST 2 VIEWS: CPT | Mod: TC,HCNC,FY

## 2020-07-15 PROCEDURE — 71046 X-RAY EXAM CHEST 2 VIEWS: CPT | Mod: 26,HCNC,, | Performed by: RADIOLOGY

## 2020-09-15 ENCOUNTER — PATIENT OUTREACH (OUTPATIENT)
Dept: ADMINISTRATIVE | Facility: HOSPITAL | Age: 75
End: 2020-09-15

## 2020-09-15 DIAGNOSIS — E11.9 TYPE 2 DIABETES MELLITUS WITHOUT COMPLICATION, WITHOUT LONG-TERM CURRENT USE OF INSULIN: ICD-10-CM

## 2020-09-15 DIAGNOSIS — E11.9 DIABETES MELLITUS WITHOUT COMPLICATION: Primary | ICD-10-CM

## 2020-09-15 NOTE — PROGRESS NOTES
Chart review completed 09/15/2020.  Care Everywhere updates requested and reviewed.  Immunizations reconciled. Media reports reviewed.  Duplicate HM overrides and  orders removed.  Overdue HM topic chart audit and/or requested.  Overdue lab testing linked to upcoming lab appointments if applies.    WOG orders placed. HA1C  Letter mailed.  PORTAL      Health Maintenance Due   Topic Date Due    DEXA SCAN  2017    Eye Exam  05/10/2018    Pneumococcal Vaccine (65+ Low/Medium Risk) (2 of 2 - PPSV23) 2018    Colorectal Cancer Screening  2019    Hemoglobin A1c  2020    Influenza Vaccine (1) 2020    Foot Exam  10/30/2020

## 2020-09-15 NOTE — LETTER
September 22, 2020    Eva Corona  [address]  [facility] [address]             [facility]  [address]  [facility] [address]  Phone: [phone]

## 2020-09-15 NOTE — TELEPHONE ENCOUNTER
----- Message from Darren Guerrero sent at 9/15/2020 12:27 PM CDT -----  Regarding: pt  Type:  RX Refill Request    Who Called:  pt  Refill or New Rx:  refill  RX Name and Strength:  glimepiride (AMARYL) 4 MG tablet 180 tablet 1 1/13/2020  How is the patient currently taking it? (ex. 1XDay):  Sig: TAKE 2 TABLETS EVERY DAY WITH BREAKFAST   Is this a 30 day or 90 day RX:  90  Preferred Pharmacy with phone number:    [facility] - [place], [place] - [address]  [address] [address]  Phone: [phone] Fax: [phone]    Local or Mail Order:  Mail Order  Ordering Provider:  Dr Silva  Best Call Back Number:  [phone]   Additional Information:  Oleksandr is sending a Fax for Refill wants to give Dr estevez up to get refill in Queue for Oleksandr. Thanks.

## 2020-09-17 RX ORDER — GLIMEPIRIDE 4 MG/1
TABLET ORAL
Qty: 180 TABLET | Refills: 1 | Status: SHIPPED | OUTPATIENT
Start: 2020-09-17 | End: 2021-02-04 | Stop reason: SDUPTHER

## 2020-09-18 ENCOUNTER — PES CALL (OUTPATIENT)
Dept: ADMINISTRATIVE | Facility: CLINIC | Age: 75
End: 2020-09-18

## 2020-09-29 ENCOUNTER — OFFICE VISIT (OUTPATIENT)
Dept: FAMILY MEDICINE | Facility: CLINIC | Age: 75
End: 2020-09-29
Payer: MEDICARE

## 2020-09-29 ENCOUNTER — TELEPHONE (OUTPATIENT)
Dept: FAMILY MEDICINE | Facility: CLINIC | Age: 75
End: 2020-09-29

## 2020-09-29 VITALS
RESPIRATION RATE: 14 BRPM | HEART RATE: 65 BPM | WEIGHT: 188.69 LBS | DIASTOLIC BLOOD PRESSURE: 40 MMHG | OXYGEN SATURATION: 95 % | BODY MASS INDEX: 31.44 KG/M2 | SYSTOLIC BLOOD PRESSURE: 110 MMHG | HEIGHT: 65 IN | TEMPERATURE: 97 F

## 2020-09-29 DIAGNOSIS — D50.0 IRON DEFICIENCY ANEMIA DUE TO CHRONIC BLOOD LOSS: ICD-10-CM

## 2020-09-29 DIAGNOSIS — Z23 NEED FOR PNEUMOCOCCAL VACCINATION: ICD-10-CM

## 2020-09-29 DIAGNOSIS — I70.0 ABDOMINAL AORTIC ATHEROSCLEROSIS: ICD-10-CM

## 2020-09-29 DIAGNOSIS — E78.5 HYPERLIPIDEMIA ASSOCIATED WITH TYPE 2 DIABETES MELLITUS: ICD-10-CM

## 2020-09-29 DIAGNOSIS — Z79.01 ANTICOAGULANT LONG-TERM USE: ICD-10-CM

## 2020-09-29 DIAGNOSIS — E11.59 HYPERTENSION ASSOCIATED WITH DIABETES: Primary | ICD-10-CM

## 2020-09-29 DIAGNOSIS — N18.30 CKD (CHRONIC KIDNEY DISEASE) STAGE 3, GFR 30-59 ML/MIN: ICD-10-CM

## 2020-09-29 DIAGNOSIS — E11.69 HYPERLIPIDEMIA ASSOCIATED WITH TYPE 2 DIABETES MELLITUS: ICD-10-CM

## 2020-09-29 DIAGNOSIS — R21 RASH: ICD-10-CM

## 2020-09-29 DIAGNOSIS — I50.22 CHRONIC SYSTOLIC HEART FAILURE: ICD-10-CM

## 2020-09-29 DIAGNOSIS — E11.9 DIABETIC EYE EXAM: ICD-10-CM

## 2020-09-29 DIAGNOSIS — R25.1 TREMOR: ICD-10-CM

## 2020-09-29 DIAGNOSIS — Z12.11 ENCOUNTER FOR FIT (FECAL IMMUNOCHEMICAL TEST) SCREENING: ICD-10-CM

## 2020-09-29 DIAGNOSIS — E66.9 OBESITY, CLASS I, BMI 30-34.9: ICD-10-CM

## 2020-09-29 DIAGNOSIS — I48.19 PERSISTENT ATRIAL FIBRILLATION: ICD-10-CM

## 2020-09-29 DIAGNOSIS — E55.9 VITAMIN D DEFICIENCY: ICD-10-CM

## 2020-09-29 DIAGNOSIS — Z01.00 DIABETIC EYE EXAM: ICD-10-CM

## 2020-09-29 DIAGNOSIS — Z23 FLU VACCINE NEED: ICD-10-CM

## 2020-09-29 DIAGNOSIS — E11.8 TYPE 2 DIABETES MELLITUS WITH COMPLICATION, WITHOUT LONG-TERM CURRENT USE OF INSULIN: ICD-10-CM

## 2020-09-29 DIAGNOSIS — N95.9 MENOPAUSAL AND POSTMENOPAUSAL DISORDER: ICD-10-CM

## 2020-09-29 DIAGNOSIS — E03.9 ACQUIRED HYPOTHYROIDISM: ICD-10-CM

## 2020-09-29 DIAGNOSIS — I15.2 HYPERTENSION ASSOCIATED WITH DIABETES: Primary | ICD-10-CM

## 2020-09-29 PROCEDURE — 1159F PR MEDICATION LIST DOCUMENTED IN MEDICAL RECORD: ICD-10-PCS | Mod: HCNC,S$GLB,, | Performed by: FAMILY MEDICINE

## 2020-09-29 PROCEDURE — 99499 RISK ADDL DX/OHS AUDIT: ICD-10-PCS | Mod: HCNC,S$GLB,, | Performed by: FAMILY MEDICINE

## 2020-09-29 PROCEDURE — 99999 PR PBB SHADOW E&M-EST. PATIENT-LVL V: ICD-10-PCS | Mod: PBBFAC,HCNC,, | Performed by: FAMILY MEDICINE

## 2020-09-29 PROCEDURE — 3078F PR MOST RECENT DIASTOLIC BLOOD PRESSURE < 80 MM HG: ICD-10-PCS | Mod: HCNC,CPTII,S$GLB, | Performed by: FAMILY MEDICINE

## 2020-09-29 PROCEDURE — 3044F PR MOST RECENT HEMOGLOBIN A1C LEVEL <7.0%: ICD-10-PCS | Mod: HCNC,CPTII,S$GLB, | Performed by: FAMILY MEDICINE

## 2020-09-29 PROCEDURE — 3078F DIAST BP <80 MM HG: CPT | Mod: HCNC,CPTII,S$GLB, | Performed by: FAMILY MEDICINE

## 2020-09-29 PROCEDURE — 3074F SYST BP LT 130 MM HG: CPT | Mod: HCNC,CPTII,S$GLB, | Performed by: FAMILY MEDICINE

## 2020-09-29 PROCEDURE — G0009 ADMIN PNEUMOCOCCAL VACCINE: HCPCS | Mod: HCNC,S$GLB,, | Performed by: FAMILY MEDICINE

## 2020-09-29 PROCEDURE — 99214 PR OFFICE/OUTPT VISIT, EST, LEVL IV, 30-39 MIN: ICD-10-PCS | Mod: HCNC,25,S$GLB, | Performed by: FAMILY MEDICINE

## 2020-09-29 PROCEDURE — G0009 PNEUMOCOCCAL POLYSACCHARIDE VACCINE 23-VALENT =>2YO SQ IM: ICD-10-PCS | Mod: HCNC,S$GLB,, | Performed by: FAMILY MEDICINE

## 2020-09-29 PROCEDURE — 3044F HG A1C LEVEL LT 7.0%: CPT | Mod: HCNC,CPTII,S$GLB, | Performed by: FAMILY MEDICINE

## 2020-09-29 PROCEDURE — 1126F AMNT PAIN NOTED NONE PRSNT: CPT | Mod: HCNC,S$GLB,, | Performed by: FAMILY MEDICINE

## 2020-09-29 PROCEDURE — 1101F PR PT FALLS ASSESS DOC 0-1 FALLS W/OUT INJ PAST YR: ICD-10-PCS | Mod: HCNC,CPTII,S$GLB, | Performed by: FAMILY MEDICINE

## 2020-09-29 PROCEDURE — 1159F MED LIST DOCD IN RCRD: CPT | Mod: HCNC,S$GLB,, | Performed by: FAMILY MEDICINE

## 2020-09-29 PROCEDURE — 99214 OFFICE O/P EST MOD 30 MIN: CPT | Mod: HCNC,25,S$GLB, | Performed by: FAMILY MEDICINE

## 2020-09-29 PROCEDURE — 3074F PR MOST RECENT SYSTOLIC BLOOD PRESSURE < 130 MM HG: ICD-10-PCS | Mod: HCNC,CPTII,S$GLB, | Performed by: FAMILY MEDICINE

## 2020-09-29 PROCEDURE — 1101F PT FALLS ASSESS-DOCD LE1/YR: CPT | Mod: HCNC,CPTII,S$GLB, | Performed by: FAMILY MEDICINE

## 2020-09-29 PROCEDURE — 1126F PR PAIN SEVERITY QUANTIFIED, NO PAIN PRESENT: ICD-10-PCS | Mod: HCNC,S$GLB,, | Performed by: FAMILY MEDICINE

## 2020-09-29 PROCEDURE — 90732 PNEUMOCOCCAL POLYSACCHARIDE VACCINE 23-VALENT =>2YO SQ IM: ICD-10-PCS | Mod: HCNC,S$GLB,, | Performed by: FAMILY MEDICINE

## 2020-09-29 PROCEDURE — 90732 PPSV23 VACC 2 YRS+ SUBQ/IM: CPT | Mod: HCNC,S$GLB,, | Performed by: FAMILY MEDICINE

## 2020-09-29 PROCEDURE — 99499 UNLISTED E&M SERVICE: CPT | Mod: HCNC,S$GLB,, | Performed by: FAMILY MEDICINE

## 2020-09-29 PROCEDURE — 99999 PR PBB SHADOW E&M-EST. PATIENT-LVL V: CPT | Mod: PBBFAC,HCNC,, | Performed by: FAMILY MEDICINE

## 2020-09-29 RX ORDER — CLOTRIMAZOLE AND BETAMETHASONE DIPROPIONATE 10; .64 MG/G; MG/G
CREAM TOPICAL 2 TIMES DAILY
Qty: 45 G | Refills: 1 | Status: SHIPPED | OUTPATIENT
Start: 2020-09-29 | End: 2020-12-21 | Stop reason: CLARIF

## 2020-09-29 RX ORDER — METFORMIN HYDROCHLORIDE 500 MG/1
1000 TABLET, EXTENDED RELEASE ORAL
Qty: 180 TABLET | Refills: 3 | Status: SHIPPED | OUTPATIENT
Start: 2020-09-29 | End: 2020-12-21 | Stop reason: CLARIF

## 2020-09-29 NOTE — TELEPHONE ENCOUNTER
Neurology referral placed on 9- by Dr Silva.  Diagnosis: Tremors  Requesting assistance scheduling appointment. Patient can be reached at [phone]. Thank you.

## 2020-09-29 NOTE — PROGRESS NOTES
Subjective:       Patient ID: Eva Corona is a 75 y.o. female.    Chief Complaint: Follow-up (6mth f/u hypertension)    HPI  Review of Systems    Patient Active Problem List   Diagnosis    Hypothyroid    Hypertension associated with diabetes    Acute on chronic diastolic heart failure    Anticoagulant long-term use    PAF (paroxysmal atrial fibrillation)    Class 1 obesity due to excess calories with body mass index (BMI) of 33.0 to 33.9 in adult    Cardiomyopathy    Persistent atrial fibrillation    Hyperlipidemia associated with type 2 diabetes mellitus, baseline     Retroperitoneal hematoma    S/P ablation of atrial fibrillation    Pulmonary hypertension    Obesity, Class I, BMI 30-34.9, today 32.3    Anemia    Type 2 diabetes mellitus with complication, without long-term current use of insulin    Abdominal aortic atherosclerosis    Nonischemic cardiomyopathy    Cardiac LV ejection fraction 10-20%    Abnormal ECG    Chronic systolic heart failure    Iron deficiency anemia due to chronic blood loss    Elevated brain natriuretic peptide (BNP) level    CKD (chronic kidney disease) stage 3, GFR 30-59 ml/min    Prerenal azotemia    Prolonged Q-T interval on ECG    On home oxygen therapy, nightly    Abdominal obesity     Patient is here for a establish care and  chronic conditions follow up.    C/o tremor which affects gradually more areas of the body- hands, head and right leg. Now interfering with drivin, writing, feeding herself.     Card Dr. Avalos PAF on eliquis    Type 2 DM on lipitor, ARB . Last a1c 6.5 1/2020. Lipids at goal    Declined flu shot, mammo and dexa. Declined colonoscopy or fit  Objective:      Physical Exam  Vitals signs and nursing note reviewed.   Constitutional:       Appearance: She is well-developed.   Cardiovascular:      Rate and Rhythm: Normal rate and regular rhythm.      Heart sounds: Normal heart sounds.   Pulmonary:      Effort: Pulmonary effort is  normal.      Breath sounds: Normal breath sounds.   Skin:     General: Skin is warm and dry.          Neurological:      Mental Status: She is alert and oriented to person, place, and time.      Comments: Resting tremor of head , hands and right leg         Assessment:       1. Hypertension associated with diabetes    2. Hyperlipidemia associated with type 2 diabetes mellitus, baseline     3. Chronic systolic heart failure    4. CKD (chronic kidney disease) stage 3, GFR 30-59 ml/min    5. Anticoagulant long-term use    6. Iron deficiency anemia due to chronic blood loss    7. Type 2 diabetes mellitus with complication, without long-term current use of insulin    8. Acquired hypothyroidism    9. Obesity, Class I, BMI 30-34.9, today 32.3    10. Abdominal aortic atherosclerosis    11. Persistent atrial fibrillation    12. Vitamin D deficiency    13. Menopausal and postmenopausal disorder    14. Encounter for FIT (fecal immunochemical test) screening    15. Diabetic eye exam    16. Flu vaccine need    17. Need for pneumococcal vaccination    18. Rash    19. Tremor        Plan:         1. Hypertension associated with diabetes  Controlled on current medications.  Continue current medications.      2. Hyperlipidemia associated with type 2 diabetes mellitus, baseline   Controlled on current medications.  Continue current medications.    - Comprehensive metabolic panel; Future  - Lipid Panel; Future    3. Chronic systolic heart failure  Cont card care and monitoring    4. CKD (chronic kidney disease) stage 3, GFR 30-59 ml/min  Stable and chronic.  Will continue to monitor q3-6 months and control chronic conditions as optimally as possible to preserve function.      5. Anticoagulant long-term use  Cont eliquis and card monitoring    6. Iron deficiency anemia due to chronic blood loss  Screen and treat as indicated:    - CBC auto differential; Future  - Ferritin; Future  - Iron and TIBC; Future    7. Type 2  "diabetes mellitus with complication, without long-term current use of insulin  Controlled on current medications.  Continue current medications.  Reduce metformin XR 500mg 2 po qd with meal due to side effects of diarrhea  - Hemoglobin A1C; Future  - Microalbumin/creatinine urine ratio; Future    8. Acquired hypothyroidism  Stable condition.  Continue current medications.  Will adjust based on lab findings or if condition changes.    - TSH; Future  - T4, free; Future    9. Obesity, Class I, BMI 30-34.9, today 32.3  Counseled patient on his ideal body weight, health consequences of being obese and current recommendations including weekly exercise and a heart healthy diet.  Current BMI is:Estimated body mass index is 31.4 kg/m² as calculated from the following:    Height as of this encounter: 5' 5" (1.651 m).    Weight as of this encounter: 85.6 kg (188 lb 11.4 oz)..  Patient is aware that ideal BMI < 25 or Weight in (lb) to have BMI = 25: 149.9.        10. Abdominal aortic atherosclerosis  Cont to lower risk factors    11. Persistent atrial fibrillation  Cont current care    12. Vitamin D deficiency  Screen and treat as indicated:    - Vitamin D; Future    13. Menopausal and postmenopausal disorder  Declined dexa    14. Encounter for FIT (fecal immunochemical test) screening  declined    15. Diabetic eye exam  declined    16. Flu vaccine need  declined    17. Need for pneumococcal vaccination  Immunize today.  Counseled patient on risks, benefits and side effects.  Patient elected to proceed with vaccination.    - (In Office Administered) Pneumococcal Polysaccharide Vaccine (23 Valent) (SQ/IM)    18. Rash  Apply to affected are  - clotrimazole-betamethasone 1-0.05% (LOTRISONE) cream; Apply topically 2 (two) times daily.  Dispense: 45 g; Refill: 1    19. Tremor  Refer for eval and treat  - Ambulatory referral/consult to Neurology; Future        Time spent with patient: 20 minutes    Patient with be reevaluated in 6 " months or sooner prn    Greater than 50% of this visit was spent counseling as described in above documentation:Yes

## 2020-10-01 ENCOUNTER — PATIENT MESSAGE (OUTPATIENT)
Dept: GASTROENTEROLOGY | Facility: CLINIC | Age: 75
End: 2020-10-01

## 2020-10-05 ENCOUNTER — PATIENT MESSAGE (OUTPATIENT)
Dept: ADMINISTRATIVE | Facility: HOSPITAL | Age: 75
End: 2020-10-05

## 2020-10-06 ENCOUNTER — LAB VISIT (OUTPATIENT)
Dept: LAB | Facility: HOSPITAL | Age: 75
End: 2020-10-06
Attending: FAMILY MEDICINE
Payer: MEDICARE

## 2020-10-06 DIAGNOSIS — D50.0 IRON DEFICIENCY ANEMIA DUE TO CHRONIC BLOOD LOSS: ICD-10-CM

## 2020-10-06 DIAGNOSIS — E11.8 TYPE 2 DIABETES MELLITUS WITH COMPLICATION, WITHOUT LONG-TERM CURRENT USE OF INSULIN: ICD-10-CM

## 2020-10-06 DIAGNOSIS — E11.69 HYPERLIPIDEMIA ASSOCIATED WITH TYPE 2 DIABETES MELLITUS: ICD-10-CM

## 2020-10-06 DIAGNOSIS — E78.5 HYPERLIPIDEMIA ASSOCIATED WITH TYPE 2 DIABETES MELLITUS: ICD-10-CM

## 2020-10-06 DIAGNOSIS — E55.9 VITAMIN D DEFICIENCY: ICD-10-CM

## 2020-10-06 DIAGNOSIS — E03.9 ACQUIRED HYPOTHYROIDISM: ICD-10-CM

## 2020-10-06 LAB
ALBUMIN SERPL BCP-MCNC: 4.2 G/DL (ref 3.5–5.2)
ALP SERPL-CCNC: 61 U/L (ref 55–135)
ALT SERPL W/O P-5'-P-CCNC: 18 U/L (ref 10–44)
ANION GAP SERPL CALC-SCNC: 10 MMOL/L (ref 8–16)
AST SERPL-CCNC: 16 U/L (ref 10–40)
BASOPHILS # BLD AUTO: 0.03 K/UL (ref 0–0.2)
BASOPHILS NFR BLD: 0.4 % (ref 0–1.9)
BILIRUB SERPL-MCNC: 0.4 MG/DL (ref 0.1–1)
BUN SERPL-MCNC: 33 MG/DL (ref 8–23)
CALCIUM SERPL-MCNC: 9.7 MG/DL (ref 8.7–10.5)
CHLORIDE SERPL-SCNC: 101 MMOL/L (ref 95–110)
CHOLEST SERPL-MCNC: 114 MG/DL (ref 120–199)
CHOLEST/HDLC SERPL: 4.1 {RATIO} (ref 2–5)
CO2 SERPL-SCNC: 32 MMOL/L (ref 23–29)
CREAT SERPL-MCNC: 1.2 MG/DL (ref 0.5–1.4)
DIFFERENTIAL METHOD: ABNORMAL
EOSINOPHIL # BLD AUTO: 0.3 K/UL (ref 0–0.5)
EOSINOPHIL NFR BLD: 4.2 % (ref 0–8)
ERYTHROCYTE [DISTWIDTH] IN BLOOD BY AUTOMATED COUNT: 15.1 % (ref 11.5–14.5)
EST. GFR  (AFRICAN AMERICAN): 51.1 ML/MIN/1.73 M^2
EST. GFR  (NON AFRICAN AMERICAN): 44.3 ML/MIN/1.73 M^2
FERRITIN SERPL-MCNC: 69 NG/ML (ref 20–300)
GLUCOSE SERPL-MCNC: 123 MG/DL (ref 70–110)
HCT VFR BLD AUTO: 35 % (ref 37–48.5)
HDLC SERPL-MCNC: 28 MG/DL (ref 40–75)
HDLC SERPL: 24.6 % (ref 20–50)
HGB BLD-MCNC: 10.2 G/DL (ref 12–16)
IMM GRANULOCYTES # BLD AUTO: 0.03 K/UL (ref 0–0.04)
IMM GRANULOCYTES NFR BLD AUTO: 0.4 % (ref 0–0.5)
IRON SERPL-MCNC: 42 UG/DL (ref 30–160)
LDLC SERPL CALC-MCNC: 64.6 MG/DL (ref 63–159)
LYMPHOCYTES # BLD AUTO: 2 K/UL (ref 1–4.8)
LYMPHOCYTES NFR BLD: 27.1 % (ref 18–48)
MCH RBC QN AUTO: 28.8 PG (ref 27–31)
MCHC RBC AUTO-ENTMCNC: 29.1 G/DL (ref 32–36)
MCV RBC AUTO: 99 FL (ref 82–98)
MONOCYTES # BLD AUTO: 0.7 K/UL (ref 0.3–1)
MONOCYTES NFR BLD: 8.8 % (ref 4–15)
NEUTROPHILS # BLD AUTO: 4.4 K/UL (ref 1.8–7.7)
NEUTROPHILS NFR BLD: 59.1 % (ref 38–73)
NONHDLC SERPL-MCNC: 86 MG/DL
NRBC BLD-RTO: 0 /100 WBC
PLATELET # BLD AUTO: 227 K/UL (ref 150–350)
PMV BLD AUTO: 10.9 FL (ref 9.2–12.9)
POTASSIUM SERPL-SCNC: 5.6 MMOL/L (ref 3.5–5.1)
PROT SERPL-MCNC: 7 G/DL (ref 6–8.4)
RBC # BLD AUTO: 3.54 M/UL (ref 4–5.4)
SATURATED IRON: 10 % (ref 20–50)
SODIUM SERPL-SCNC: 143 MMOL/L (ref 136–145)
T4 FREE SERPL-MCNC: 1.04 NG/DL (ref 0.71–1.51)
TOTAL IRON BINDING CAPACITY: 413 UG/DL (ref 250–450)
TRANSFERRIN SERPL-MCNC: 279 MG/DL (ref 200–375)
TRIGL SERPL-MCNC: 107 MG/DL (ref 30–150)
TSH SERPL DL<=0.005 MIU/L-ACNC: 3.42 UIU/ML (ref 0.4–4)
WBC # BLD AUTO: 7.39 K/UL (ref 3.9–12.7)

## 2020-10-06 PROCEDURE — 82306 VITAMIN D 25 HYDROXY: CPT | Mod: HCNC

## 2020-10-06 PROCEDURE — 84443 ASSAY THYROID STIM HORMONE: CPT | Mod: HCNC

## 2020-10-06 PROCEDURE — 36415 COLL VENOUS BLD VENIPUNCTURE: CPT | Mod: HCNC,PO

## 2020-10-06 PROCEDURE — 82728 ASSAY OF FERRITIN: CPT | Mod: HCNC

## 2020-10-06 PROCEDURE — 83540 ASSAY OF IRON: CPT | Mod: HCNC

## 2020-10-06 PROCEDURE — 80061 LIPID PANEL: CPT | Mod: HCNC

## 2020-10-06 PROCEDURE — 80053 COMPREHEN METABOLIC PANEL: CPT | Mod: HCNC

## 2020-10-06 PROCEDURE — 85025 COMPLETE CBC W/AUTO DIFF WBC: CPT | Mod: HCNC

## 2020-10-06 PROCEDURE — 83036 HEMOGLOBIN GLYCOSYLATED A1C: CPT | Mod: HCNC

## 2020-10-06 PROCEDURE — 84439 ASSAY OF FREE THYROXINE: CPT | Mod: HCNC

## 2020-10-07 LAB
25(OH)D3+25(OH)D2 SERPL-MCNC: 69 NG/ML (ref 30–96)
ESTIMATED AVG GLUCOSE: 140 MG/DL (ref 68–131)
HBA1C MFR BLD HPLC: 6.5 % (ref 4–5.6)

## 2020-12-21 ENCOUNTER — HOSPITAL ENCOUNTER (EMERGENCY)
Facility: HOSPITAL | Age: 75
Discharge: HOME | End: 2020-12-21
Attending: EMERGENCY MEDICINE
Payer: MEDICARE

## 2020-12-21 VITALS
SYSTOLIC BLOOD PRESSURE: 127 MMHG | TEMPERATURE: 98 F | OXYGEN SATURATION: 95 % | HEART RATE: 86 BPM | WEIGHT: 182 LBS | RESPIRATION RATE: 20 BRPM | BODY MASS INDEX: 30.32 KG/M2 | HEIGHT: 65 IN | DIASTOLIC BLOOD PRESSURE: 70 MMHG

## 2020-12-21 DIAGNOSIS — S20.222A CONTUSION OF LEFT SIDE OF BACK, INITIAL ENCOUNTER: ICD-10-CM

## 2020-12-21 DIAGNOSIS — S20.212A CONTUSION OF RIB ON LEFT SIDE, INITIAL ENCOUNTER: Primary | ICD-10-CM
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
## 2020-12-21 PROCEDURE — 99283 EMERGENCY DEPT VISIT LOW MDM: CPT | Mod: 25,HCNC

## 2020-12-21 RX ORDER — ACETAMINOPHEN 325 MG/1
650 TABLET ORAL
Status: DISCONTINUED | OUTPATIENT
Start: 2020-12-21 | End: 2020-12-21

## 2020-12-22 ENCOUNTER — PES CALL (OUTPATIENT)
Dept: ADMINISTRATIVE | Facility: CLINIC | Age: 75
End: 2020-12-22

## 2020-12-22 NOTE — DISCHARGE INSTRUCTIONS
Take tylenol as needed for pain. Be careful ambulating at home. Do not fall.  Follow up closely with your primary care provider.  For worsening symptoms, chest pain, shortness of breath, increased abdominal pain, high grade fever, stroke or stroke like symptoms, immediately go to the nearest Emergency Room or call 911 as soon as possible.

## 2020-12-22 NOTE — ED NOTES
Received report from KIEL Ratliff RN.  Assumed patient care at this time.  Pt in NAD.  Radiology tech at bedside to take patient to CT. Will continue to monitor.

## 2020-12-22 NOTE — ED PROVIDER NOTES
Encounter Date: 12/21/2020    SCRIBE #1 NOTE: IKaren, am scribing for, and in the presence of, Holly Hill PA-C.       History     Chief Complaint   Patient presents with    Fall     left posterior rib and elbow pain        Time seen by provider: 6:07 PM on 12/21/2020    Eva Corona is a 75 y.o. female with PMHx of Afib, CHF, DM, and HTN who presents to the ED s/p fall with an onset of left rib and back pain. Patient was carrying towels when she tripped over her oxygen cord and fell onto her left side yesterday. She denies hitting her head or LOC. She is typically on 2L of home O2 at night but states she has needed to be on oxygen more often. Patient also notes abrasion to left elbow. She denies N/V, CP or any other symptoms at this time. She denies current use of blood thinners. Cardiologist is Dr. Avalos. Pulmonologist is Dr. Khan. Last visit with PCP was 4-5 months ago. No pertinent PSHx.    The history is provided by the patient and a relative.     Review of patient's allergies indicates:   Allergen Reactions    Codeine Nausea And Vomiting    Neuromuscular blockers, steroidal      Other reaction(s): Unknown    Morphine Nausea And Vomiting     Past Medical History:   Diagnosis Date    Anticoagulant long-term use     Atrial fibrillation     Cataract     CHF (congestive heart failure)     Diabetes mellitus     Encounter for blood transfusion     Hyperlipidemia     Hypertension     Hypothyroidism     Iron deficiency 4/29/2019    On home oxygen therapy     2L, nightly    S/P ablation of atrial fibrillation 7/22/2015    Shortness of breath on exertion     Thyroid disease     Type 2 diabetes mellitus      Past Surgical History:   Procedure Laterality Date    CARDIAC CATHETERIZATION Right     CARDIAC ELECTROPHYSIOLOGY MAPPING AND ABLATION      CATARACT EXTRACTION W/ INTRAOCULAR LENS IMPLANT Bilateral     cataract, implants    COLONOSCOPY      HYSTERECTOMY      TVH, ovaries  intact, benign     Family History   Problem Relation Age of Onset    Diabetes Mother     Heart disease Sister     No Known Problems Brother     No Known Problems Daughter     No Known Problems Son     No Known Problems Brother     No Known Problems Brother     No Known Problems Son     Cancer Neg Hx      Social History     Tobacco Use    Smoking status: Never Smoker    Smokeless tobacco: Never Used   Substance Use Topics    Alcohol use: No     Alcohol/week: 0.0 standard drinks    Drug use: No     Review of Systems   Constitutional: Negative for activity change, appetite change, chills and fever.   HENT: Negative for congestion, rhinorrhea and sore throat.    Eyes: Negative for redness and visual disturbance.   Respiratory: Negative for cough, chest tightness and shortness of breath.    Cardiovascular: Negative for chest pain.   Gastrointestinal: Negative for abdominal pain, diarrhea, nausea and vomiting.   Genitourinary: Negative for dysuria and frequency.   Musculoskeletal: Positive for arthralgias and back pain. Negative for neck pain and neck stiffness.        Positive for left rib pain.   Skin: Negative for rash.        Positive for abrasion.   Neurological: Negative for dizziness, syncope, weakness, numbness and headaches.   Hematological: Does not bruise/bleed easily.       Physical Exam     Initial Vitals [12/21/20 1748]   BP Pulse Resp Temp SpO2   127/70 86 20 97.5 °F (36.4 °C) 95 %      MAP       --         Physical Exam    Nursing note and vitals reviewed.  Constitutional: She appears well-developed and well-nourished. She is cooperative.  Non-toxic appearance. She does not have a sickly appearance.   HENT:   Head: Normocephalic and atraumatic.   Right Ear: External ear normal.   Left Ear: External ear normal.   Nose: Nose normal.   Mouth/Throat: Uvula is midline.   Eyes: Conjunctivae and lids are normal. Pupils are equal, round, and reactive to light.   Neck: Normal range of motion and full  passive range of motion without pain. Neck supple.   Cardiovascular: Normal rate, regular rhythm and normal heart sounds. Exam reveals no gallop and no friction rub.    No murmur heard.  Pulses:       Radial pulses are 2+ on the right side and 2+ on the left side.   Pulmonary/Chest: Effort normal. She has no decreased breath sounds.   Left posterior rib tenderness to palpation. No crepitus. Mild tenderness to the thoracic spine with an area of ecchymosis to the left thoracic paraspinal area.    Abdominal: Soft. Normal appearance. There is no abdominal tenderness. There is no rigidity, no rebound and no guarding.   Musculoskeletal:      Comments: Small abrasion to left elbow. No bony tenderness. Full passive ROM.    Neurological: She is alert and oriented to person, place, and time.   Equal strength and sensation to bilateral upper and lower extremitties. Sensation intact. Ambulating with a normal gait.    Skin: Skin is warm, dry and intact. No rash noted.         ED Course   Procedures  Labs Reviewed - No data to display       Imaging Results          X-Ray Chest 1 View (Final result)  Result time 12/21/20 19:42:25    Final result by Felipe Houser MD (12/21/20 19:42:25)                 Impression:      No convincing evidence of acute cardiopulmonary disease.      Electronically signed by: Felipe Houser  Date:    12/21/2020  Time:    19:42             Narrative:    EXAMINATION:  XR CHEST 1 VIEW    CLINICAL HISTORY:  Unspecified fall, initial encounter    TECHNIQUE:  Single frontal view of the chest was performed.    COMPARISON:  Chest radiograph performed 07/15/2020    FINDINGS:  Cardiac silhouette is again noted to be enlarged.  Prominence of the central pulmonary vasculature is again noted.  Chronic appearing interstitial increased attenuation as before.  No definite acute appearing of the lower opacity identified.  No pneumothorax or pleural effusion is seen.  No acute findings suggested in the visualized  upper abdomen.  Osseous and soft tissue structures without definite acute change.                               X-Ray Ribs 2 View Left (Final result)  Result time 12/21/20 19:28:52    Final result by Felipe Houser MD (12/21/20 19:28:52)                 Impression:      No definite acute displaced left-sided rib fracture.      Electronically signed by: Felipe Houser  Date:    12/21/2020  Time:    19:28             Narrative:    EXAMINATION:  XR RIBS 2 VIEW LEFT    CLINICAL HISTORY:  Unspecified fall, initial encounter    TECHNIQUE:  Two views of the left ribs were performed.    COMPARISON:  Chest radiograph performed 07/15/2020    FINDINGS:  No definite acute displaced left-sided rib fracture.                               X-Ray Thoracic Spine AP Lateral (Final result)  Result time 12/21/20 19:26:26    Final result by Felipe Houser MD (12/21/20 19:26:26)                 Impression:      No definite acute thoracic spine fracture or evidence of traumatic subluxation.      Electronically signed by: Felipe Houser  Date:    12/21/2020  Time:    19:26             Narrative:    EXAMINATION:  XR THORACIC SPINE AP LATERAL    CLINICAL HISTORY:  Unspecified fall, initial encounter    TECHNIQUE:  AP and lateral views of the thoracic spine were performed.    COMPARISON:  Chest radiograph performed 07/15/2020    FINDINGS:  No definite acute displaced fracture or evidence of traumatic subluxation.  Osseous demineralization is noted.  Mild wedging of several midthoracic vertebral bodies appears relatively similar to 07/15/2020 radiograph.  No definite acute findings suggested in the visualized lungs.                                 Medical Decision Making:   History:   I obtained history from: someone other than patient.  Old Medical Records: I decided to obtain old medical records.  Clinical Tests:   Radiological Study: Ordered and Reviewed       APC / Resident Notes:   Urgent evaluation of a 75-year-old female who  presents with left-sided rib and back pain after a fall yesterday.  She denies hitting her head.  She denies loss conscious.  She is alert and oriented.  She denies blood thinner use.  She has a normal neurological exam.  She has bony tenderness to the left posterior ribs with no crepitus.  Breath sounds clear equal bilaterally.  She has some mild bony tenderness to the thoracic spine with an area of ecchymosis noted to the left paraspinal region.  Equal strength and sensation bilateral upper extremities.  X-ray showed no acute fracture.  Recommend symptomatic treatment at home. Discussed results with patient. Return precautions given. Based on my clinical evaluation, I do not appreciate any immediate, emergent, or life threatening condition or etiology that warrants additional workup today and feel that the patient can be discharged with close follow up care.  Patient is to follow up with their primary care provider. Case was discussed with Dr. ramírez who has evaluated the patient and is in agreement with the plan of care. All questions answered.          Scribe Attestation:   Scribe #1: I performed the above scribed service and the documentation accurately describes the services I performed. I attest to the accuracy of the note.    I, Holly Hill PA-C, personally performed the services described in this documentation. All medical record entries made by the scribe were at my direction and in my presence.  I have reviewed the chart and agree that the record reflects my personal performance and is accurate and complete. Holly Hill PA-C.  8:10 PM 12/21/2020                    Clinical Impression:     ICD-10-CM ICD-9-CM   1. Contusion of rib on left side, initial encounter  S20.212A 922.1   2. Fall  W19.XXXA E888.9   3. Contusion of left side of back, initial encounter  S20.222A 922.31                          ED Disposition Condition    Discharge Stable        ED Prescriptions     None         Follow-up Information     Follow up With Specialties Details Why Contact Info    Irina Silva MD Family Medicine   [address] [facility] [address]  [phone]      [facility]  As needed [address] [place] [address] Drive  [facility] [id number] [phone]                                       Holly Hill PA-C  12/21/20 2012

## 2020-12-28 ENCOUNTER — PATIENT MESSAGE (OUTPATIENT)
Dept: FAMILY MEDICINE | Facility: CLINIC | Age: 75
End: 2020-12-28

## 2020-12-29 ENCOUNTER — HOSPITAL ENCOUNTER (OUTPATIENT)
Facility: HOSPITAL | Age: 75
Discharge: LEFT BEFORE DISCHARGE | End: 2020-12-30
Attending: EMERGENCY MEDICINE | Admitting: INTERNAL MEDICINE
Payer: MEDICARE

## 2020-12-29 ENCOUNTER — NURSE TRIAGE (OUTPATIENT)
Dept: ADMINISTRATIVE | Facility: CLINIC | Age: 75
End: 2020-12-29

## 2020-12-29 DIAGNOSIS — I50.23 ACUTE ON CHRONIC SYSTOLIC CONGESTIVE HEART FAILURE: Primary | ICD-10-CM

## 2020-12-29 DIAGNOSIS — R06.02 SHORTNESS OF BREATH: ICD-10-CM

## 2020-12-29 DIAGNOSIS — I50.9 CHF (CONGESTIVE HEART FAILURE): ICD-10-CM

## 2020-12-29 LAB
ALBUMIN SERPL BCP-MCNC: 4.1 G/DL (ref 3.5–5.2)
ALP SERPL-CCNC: 89 U/L (ref 55–135)
ALT SERPL W/O P-5'-P-CCNC: 41 U/L (ref 10–44)
ANION GAP SERPL CALC-SCNC: 10 MMOL/L (ref 8–16)
AST SERPL-CCNC: 28 U/L (ref 10–40)
BASOPHILS # BLD AUTO: 0.01 K/UL (ref 0–0.2)
BASOPHILS NFR BLD: 0.1 % (ref 0–1.9)
BILIRUB SERPL-MCNC: 0.4 MG/DL (ref 0.1–1)
BNP SERPL-MCNC: 3804 PG/ML (ref 0–99)
BUN SERPL-MCNC: 27 MG/DL (ref 8–23)
CALCIUM SERPL-MCNC: 10.3 MG/DL (ref 8.7–10.5)
CHLORIDE SERPL-SCNC: 104 MMOL/L (ref 95–110)
CO2 SERPL-SCNC: 30 MMOL/L (ref 23–29)
CREAT SERPL-MCNC: 1.3 MG/DL (ref 0.5–1.4)
DIFFERENTIAL METHOD: ABNORMAL
EOSINOPHIL # BLD AUTO: 0.1 K/UL (ref 0–0.5)
EOSINOPHIL NFR BLD: 1.5 % (ref 0–8)
ERYTHROCYTE [DISTWIDTH] IN BLOOD BY AUTOMATED COUNT: 16.4 % (ref 11.5–14.5)
EST. GFR  (AFRICAN AMERICAN): 46 ML/MIN/1.73 M^2
EST. GFR  (NON AFRICAN AMERICAN): 40 ML/MIN/1.73 M^2
GLUCOSE SERPL-MCNC: 304 MG/DL (ref 70–110)
HCT VFR BLD AUTO: 35.1 % (ref 37–48.5)
HGB BLD-MCNC: 10.2 G/DL (ref 12–16)
IMM GRANULOCYTES # BLD AUTO: 0.03 K/UL (ref 0–0.04)
IMM GRANULOCYTES NFR BLD AUTO: 0.4 % (ref 0–0.5)
INR PPP: 1.2 (ref 0.8–1.2)
LYMPHOCYTES # BLD AUTO: 1.4 K/UL (ref 1–4.8)
LYMPHOCYTES NFR BLD: 17.6 % (ref 18–48)
MCH RBC QN AUTO: 29.4 PG (ref 27–31)
MCHC RBC AUTO-ENTMCNC: 29.1 G/DL (ref 32–36)
MCV RBC AUTO: 101 FL (ref 82–98)
MONOCYTES # BLD AUTO: 0.8 K/UL (ref 0.3–1)
MONOCYTES NFR BLD: 9.5 % (ref 4–15)
NEUTROPHILS # BLD AUTO: 5.6 K/UL (ref 1.8–7.7)
NEUTROPHILS NFR BLD: 70.9 % (ref 38–73)
NRBC BLD-RTO: 0 /100 WBC
PLATELET # BLD AUTO: 291 K/UL (ref 150–350)
PMV BLD AUTO: 9.8 FL (ref 9.2–12.9)
POTASSIUM SERPL-SCNC: 4.6 MMOL/L (ref 3.5–5.1)
PROT SERPL-MCNC: 7.3 G/DL (ref 6–8.4)
PROTHROMBIN TIME: 12.8 SEC (ref 9–12.5)
RBC # BLD AUTO: 3.47 M/UL (ref 4–5.4)
SARS-COV-2 RDRP RESP QL NAA+PROBE: NEGATIVE
SODIUM SERPL-SCNC: 144 MMOL/L (ref 136–145)
TROPONIN I SERPL DL<=0.01 NG/ML-MCNC: 0.04 NG/ML (ref 0–0.03)
WBC # BLD AUTO: 7.89 K/UL (ref 3.9–12.7)

## 2020-12-29 PROCEDURE — 99285 EMERGENCY DEPT VISIT HI MDM: CPT | Mod: 25,HCNC

## 2020-12-29 PROCEDURE — 84484 ASSAY OF TROPONIN QUANT: CPT | Mod: HCNC

## 2020-12-29 PROCEDURE — 36415 COLL VENOUS BLD VENIPUNCTURE: CPT | Mod: HCNC

## 2020-12-29 PROCEDURE — 80053 COMPREHEN METABOLIC PANEL: CPT | Mod: HCNC

## 2020-12-29 PROCEDURE — 96374 THER/PROPH/DIAG INJ IV PUSH: CPT

## 2020-12-29 PROCEDURE — 83880 ASSAY OF NATRIURETIC PEPTIDE: CPT | Mod: HCNC

## 2020-12-29 PROCEDURE — 93010 EKG 12-LEAD: ICD-10-PCS | Mod: HCNC,,, | Performed by: INTERNAL MEDICINE

## 2020-12-29 PROCEDURE — 93005 ELECTROCARDIOGRAM TRACING: CPT | Mod: HCNC

## 2020-12-29 PROCEDURE — U0002 COVID-19 LAB TEST NON-CDC: HCPCS | Mod: HCNC

## 2020-12-29 PROCEDURE — 93010 ELECTROCARDIOGRAM REPORT: CPT | Mod: HCNC,,, | Performed by: INTERNAL MEDICINE

## 2020-12-29 PROCEDURE — 63600175 PHARM REV CODE 636 W HCPCS: Mod: HCNC | Performed by: EMERGENCY MEDICINE

## 2020-12-29 PROCEDURE — 85610 PROTHROMBIN TIME: CPT | Mod: HCNC

## 2020-12-29 PROCEDURE — G0378 HOSPITAL OBSERVATION PER HR: HCPCS | Mod: HCNC

## 2020-12-29 PROCEDURE — 85025 COMPLETE CBC W/AUTO DIFF WBC: CPT | Mod: HCNC

## 2020-12-29 RX ORDER — PROPRANOLOL HYDROCHLORIDE 120 MG/1
160 CAPSULE, EXTENDED RELEASE ORAL DAILY
COMMUNITY
End: 2021-03-17

## 2020-12-29 RX ORDER — FUROSEMIDE 10 MG/ML
20 INJECTION INTRAMUSCULAR; INTRAVENOUS
Status: COMPLETED | OUTPATIENT
Start: 2020-12-29 | End: 2020-12-29

## 2020-12-29 RX ADMIN — FUROSEMIDE 20 MG: 10 INJECTION, SOLUTION INTRAMUSCULAR; INTRAVENOUS at 11:12

## 2020-12-29 NOTE — TELEPHONE ENCOUNTER
Pt has extensive cardiac hx and pulmonary htn, on home O2 at night.  She has been having more significant sob than usual, has been using her home O2 during the day time, as well.  Sob is mostly exertional.  She was seen in the ED several days ago for sob, as well.  No improvement since seen in the ED.  She feels like she has a fever, when she checked her temp she said the thermometer read 116??  Advised this was an erroneous reading.  Advised she be seen in the office now,gave information on closest [facility].  She preferred not to go there, will go to the ED for evaluation.  Reason for Disposition   Longstanding difficulty breathing (e.g., CHF, COPD, emphysema) and worse than normal    Additional Information   Negative: Breathing stopped and hasn't returned   Negative: Choking on something   Negative: SEVERE difficulty breathing (e.g., struggling for each breath, speaks in single words, pulse > 120)   Negative: Bluish (or gray) lips or face   Negative: Difficult to awaken or acting confused (e.g., disoriented, slurred speech)   Negative: Passed out (i.e., fainted, collapsed and was not responding)   Negative: Wheezing started suddenly after medicine, an allergic food, or bee sting   Negative: Stridor   Negative: Slow, shallow and weak breathing   Negative: Sounds like a life-threatening emergency to the triager   Negative: Chest pain   Negative: Wheezing (high pitched whistling sound) and previous asthma attacks or use of asthma medicines   Negative: Difficulty breathing and only present when coughing   Negative: Difficulty breathing and only from stuffy or runny nose   Negative: Difficulty breathing and within 14 days of COVID-19 Exposure   Negative: MODERATE difficulty breathing (e.g., speaks in phrases, SOB even at rest, pulse 100-120) of new onset or worse than normal   Negative: Wheezing can be heard across the room   Negative: Drooling or spitting out saliva (because can't swallow)   Negative:  "Any history of prior "blood clot" in leg or lungs   Negative: Illness requiring prolonged bedrest in past month (e.g., immobilization, long hospital stay)   Negative: Hip or leg fracture (broken bone) in past month (or had cast on leg or ankle in past month)   Negative: Major surgery in the past month   Negative: Long-distance travel in past month (e.g., car, bus, train, plane; with trip lasting 6 or more hours)   Negative: Extra heart beats OR irregular heart beating   (i.e., "palpitations")   Negative: Fever > 103 F (39.4 C)   Negative: Fever > 101 F (38.3 C) and over 60 years of age   Negative: Fever > 100.0 F (37.8 C) and bedridden (e.g., nursing home patient, stroke, chronic illness, recovering from surgery)   Negative: Fever > 100.0 F (37.8 C) and diabetes mellitus or weak immune system (e.g., HIV positive, cancer chemo, splenectomy, organ transplant, chronic steroids)   Negative: Periods where breathing stops and then resumes normally and bedridden (e.g., nursing home patient, CVA)   Negative: Pregnant or postpartum (from 0 to 6 weeks after delivery)   Negative: Patient sounds very sick or weak to the triager   Negative: MILD difficulty breathing (e.g., minimal/no SOB at rest, SOB with walking, pulse < 100) of new onset or worse than normal    Protocols used: BREATHING DIFFICULTY-A-OH    "

## 2020-12-30 VITALS
HEIGHT: 65 IN | WEIGHT: 195 LBS | TEMPERATURE: 96 F | OXYGEN SATURATION: 96 % | RESPIRATION RATE: 18 BRPM | DIASTOLIC BLOOD PRESSURE: 55 MMHG | HEART RATE: 54 BPM | BODY MASS INDEX: 32.49 KG/M2 | SYSTOLIC BLOOD PRESSURE: 132 MMHG

## 2020-12-30 LAB
ANION GAP SERPL CALC-SCNC: 8 MMOL/L (ref 8–16)
BUN SERPL-MCNC: 25 MG/DL (ref 8–23)
CALCIUM SERPL-MCNC: 10 MG/DL (ref 8.7–10.5)
CHLORIDE SERPL-SCNC: 101 MMOL/L (ref 95–110)
CO2 SERPL-SCNC: 32 MMOL/L (ref 23–29)
CREAT SERPL-MCNC: 1.3 MG/DL (ref 0.5–1.4)
EST. GFR  (AFRICAN AMERICAN): 46 ML/MIN/1.73 M^2
EST. GFR  (NON AFRICAN AMERICAN): 40 ML/MIN/1.73 M^2
ESTIMATED AVG GLUCOSE: 151 MG/DL (ref 68–131)
GLUCOSE SERPL-MCNC: 250 MG/DL (ref 70–110)
HBA1C MFR BLD HPLC: 6.9 % (ref 4–5.6)
MAGNESIUM SERPL-MCNC: 2 MG/DL (ref 1.6–2.6)
POCT GLUCOSE: 216 MG/DL (ref 70–110)
POCT GLUCOSE: 223 MG/DL (ref 70–110)
POCT GLUCOSE: 261 MG/DL (ref 70–110)
POTASSIUM SERPL-SCNC: 4.4 MMOL/L (ref 3.5–5.1)
SODIUM SERPL-SCNC: 141 MMOL/L (ref 136–145)
TSH SERPL DL<=0.005 MIU/L-ACNC: 3.76 UIU/ML (ref 0.4–4)

## 2020-12-30 PROCEDURE — 25000003 PHARM REV CODE 250: Mod: HCNC | Performed by: NURSE PRACTITIONER

## 2020-12-30 PROCEDURE — 36415 COLL VENOUS BLD VENIPUNCTURE: CPT | Mod: HCNC

## 2020-12-30 PROCEDURE — 80048 BASIC METABOLIC PNL TOTAL CA: CPT | Mod: HCNC

## 2020-12-30 PROCEDURE — 94760 N-INVAS EAR/PLS OXIMETRY 1: CPT | Mod: HCNC

## 2020-12-30 PROCEDURE — 94761 N-INVAS EAR/PLS OXIMETRY MLT: CPT | Mod: HCNC

## 2020-12-30 PROCEDURE — 83036 HEMOGLOBIN GLYCOSYLATED A1C: CPT | Mod: HCNC

## 2020-12-30 PROCEDURE — 25000003 PHARM REV CODE 250: Mod: HCNC | Performed by: INTERNAL MEDICINE

## 2020-12-30 PROCEDURE — G0378 HOSPITAL OBSERVATION PER HR: HCPCS | Mod: HCNC

## 2020-12-30 PROCEDURE — 27000221 HC OXYGEN, UP TO 24 HOURS: Mod: HCNC

## 2020-12-30 PROCEDURE — 96372 THER/PROPH/DIAG INJ SC/IM: CPT

## 2020-12-30 PROCEDURE — 97803 MED NUTRITION INDIV SUBSEQ: CPT | Mod: HCNC

## 2020-12-30 PROCEDURE — 83735 ASSAY OF MAGNESIUM: CPT | Mod: HCNC

## 2020-12-30 PROCEDURE — 84443 ASSAY THYROID STIM HORMONE: CPT | Mod: HCNC

## 2020-12-30 PROCEDURE — 63600175 PHARM REV CODE 636 W HCPCS: Mod: HCNC | Performed by: NURSE PRACTITIONER

## 2020-12-30 PROCEDURE — 96376 TX/PRO/DX INJ SAME DRUG ADON: CPT

## 2020-12-30 RX ORDER — ATORVASTATIN CALCIUM 10 MG/1
10 TABLET, FILM COATED ORAL DAILY
Status: DISCONTINUED | OUTPATIENT
Start: 2020-12-30 | End: 2020-12-30 | Stop reason: HOSPADM

## 2020-12-30 RX ORDER — FUROSEMIDE 10 MG/ML
40 INJECTION INTRAMUSCULAR; INTRAVENOUS
Status: DISCONTINUED | OUTPATIENT
Start: 2020-12-30 | End: 2020-12-30 | Stop reason: HOSPADM

## 2020-12-30 RX ORDER — GLIMEPIRIDE 2 MG/1
4 TABLET ORAL
Status: DISCONTINUED | OUTPATIENT
Start: 2020-12-30 | End: 2020-12-30

## 2020-12-30 RX ORDER — SODIUM CHLORIDE 0.9 % (FLUSH) 0.9 %
10 SYRINGE (ML) INJECTION
Status: DISCONTINUED | OUTPATIENT
Start: 2020-12-30 | End: 2020-12-30 | Stop reason: HOSPADM

## 2020-12-30 RX ORDER — IBUPROFEN 200 MG
16 TABLET ORAL
Status: DISCONTINUED | OUTPATIENT
Start: 2020-12-30 | End: 2020-12-30 | Stop reason: HOSPADM

## 2020-12-30 RX ORDER — GLUCAGON 1 MG
1 KIT INJECTION
Status: DISCONTINUED | OUTPATIENT
Start: 2020-12-30 | End: 2020-12-30 | Stop reason: HOSPADM

## 2020-12-30 RX ORDER — IBUPROFEN 200 MG
24 TABLET ORAL
Status: DISCONTINUED | OUTPATIENT
Start: 2020-12-30 | End: 2020-12-30 | Stop reason: HOSPADM

## 2020-12-30 RX ORDER — METOPROLOL SUCCINATE 50 MG/1
200 TABLET, EXTENDED RELEASE ORAL DAILY
Status: DISCONTINUED | OUTPATIENT
Start: 2020-12-30 | End: 2020-12-30 | Stop reason: HOSPADM

## 2020-12-30 RX ORDER — GLIMEPIRIDE 2 MG/1
4 TABLET ORAL
Status: DISCONTINUED | OUTPATIENT
Start: 2020-12-30 | End: 2020-12-30 | Stop reason: HOSPADM

## 2020-12-30 RX ORDER — INSULIN ASPART 100 [IU]/ML
1-10 INJECTION, SOLUTION INTRAVENOUS; SUBCUTANEOUS
Status: DISCONTINUED | OUTPATIENT
Start: 2020-12-30 | End: 2020-12-30 | Stop reason: HOSPADM

## 2020-12-30 RX ORDER — ONDANSETRON 4 MG/1
4 TABLET, ORALLY DISINTEGRATING ORAL EVERY 8 HOURS PRN
Status: DISCONTINUED | OUTPATIENT
Start: 2020-12-30 | End: 2020-12-30 | Stop reason: HOSPADM

## 2020-12-30 RX ADMIN — SACUBITRIL AND VALSARTAN 1 TABLET: 49; 51 TABLET, FILM COATED ORAL at 08:12

## 2020-12-30 RX ADMIN — INSULIN ASPART 3 UNITS: 100 INJECTION, SOLUTION INTRAVENOUS; SUBCUTANEOUS at 02:12

## 2020-12-30 RX ADMIN — LEVOTHYROXINE SODIUM 75 MCG: 0.03 TABLET ORAL at 06:12

## 2020-12-30 RX ADMIN — INSULIN ASPART 2 UNITS: 100 INJECTION, SOLUTION INTRAVENOUS; SUBCUTANEOUS at 06:12

## 2020-12-30 RX ADMIN — ATORVASTATIN CALCIUM 10 MG: 10 TABLET, FILM COATED ORAL at 08:12

## 2020-12-30 RX ADMIN — APIXABAN 5 MG: 2.5 TABLET, FILM COATED ORAL at 08:12

## 2020-12-30 RX ADMIN — SACUBITRIL AND VALSARTAN 2 TABLET: 24; 26 TABLET, FILM COATED ORAL at 08:12

## 2020-12-30 RX ADMIN — FUROSEMIDE 40 MG: 10 INJECTION, SOLUTION INTRAMUSCULAR; INTRAVENOUS at 02:12

## 2020-12-31 LAB
AORTIC ROOT ANNULUS: 3.28 CM
AORTIC VALVE CUSP SEPERATION: 2.31 CM
AV INDEX (PROSTH): 0.6
AV MEAN GRADIENT: 3 MMHG
AV PEAK GRADIENT: 5 MMHG
AV VALVE AREA: 3.06 CM2
AV VELOCITY RATIO: 0.62
BSA FOR ECHO PROCEDURE: 2.01 M2
CV ECHO LV RWT: 0.32 CM
DOP CALC AO PEAK VEL: 1.09 M/S
DOP CALC AO VTI: 28.13 CM
DOP CALC LVOT AREA: 5.1 CM2
DOP CALC LVOT DIAMETER: 2.55 CM
DOP CALC LVOT PEAK VEL: 0.68 M/S
DOP CALC LVOT STROKE VOLUME: 86.21 CM3
DOP CALCLVOT PEAK VEL VTI: 16.89 CM
E WAVE DECELERATION TIME: 194.95 MSEC
E/A RATIO: 5
E/E' RATIO: 22 M/S
ECHO LV POSTERIOR WALL: 1.03 CM (ref 0.6–1.1)
FRACTIONAL SHORTENING: 14 % (ref 28–44)
INTERVENTRICULAR SEPTUM: 1.24 CM (ref 0.6–1.1)
IVRT: 95.15 MSEC
LA MAJOR: 7.63 CM
LA WIDTH: 4.57 CM
LEFT ATRIUM SIZE: 5.23 CM
LEFT ATRIUM VOLUME INDEX MOD: 41 ML/M2
LEFT ATRIUM VOLUME MOD: 80.33 CM3
LEFT INTERNAL DIMENSION IN SYSTOLE: 5.48 CM (ref 2.1–4)
LEFT VENTRICLE DIASTOLIC VOLUME INDEX: 106.36 ML/M2
LEFT VENTRICLE DIASTOLIC VOLUME: 208.15 ML
LEFT VENTRICLE MASS INDEX: 165 G/M2
LEFT VENTRICLE SYSTOLIC VOLUME INDEX: 74.6 ML/M2
LEFT VENTRICLE SYSTOLIC VOLUME: 145.92 ML
LEFT VENTRICULAR INTERNAL DIMENSION IN DIASTOLE: 6.39 CM (ref 3.5–6)
LEFT VENTRICULAR MASS: 323.87 G
LV LATERAL E/E' RATIO: 15.71 M/S
LV SEPTAL E/E' RATIO: 36.67 M/S
MV A" WAVE DURATION": 10.85 MSEC
MV PEAK A VEL: 0.22 M/S
MV PEAK E VEL: 1.1 M/S
PISA MRMAX VEL: 0.05 M/S
PISA RADIUS: 0.64 CM
PISA TR MAX VEL: 3.48 M/S
PISA TR VN NYQUIST: 0 M/S
PV PEAK VELOCITY: 0.57 CM/S
RA MAJOR: 6.77 CM
RA WIDTH: 5.17 CM
RIGHT VENTRICULAR END-DIASTOLIC DIMENSION: 5.65 CM
RV TISSUE DOPPLER FREE WALL SYSTOLIC VELOCITY 1 (APICAL 4 CHAMBER VIEW): 8.85 CM/S
TDI LATERAL: 0.07 M/S
TDI SEPTAL: 0.03 M/S
TDI: 0.05 M/S
TR MAX PG: 48 MMHG
TRICUSPID ANNULAR PLANE SYSTOLIC EXCURSION: 2.24 CM

## 2021-01-04 ENCOUNTER — PATIENT MESSAGE (OUTPATIENT)
Dept: ADMINISTRATIVE | Facility: HOSPITAL | Age: 76
End: 2021-01-04

## 2021-01-05 ENCOUNTER — PATIENT OUTREACH (OUTPATIENT)
Dept: ADMINISTRATIVE | Facility: OTHER | Age: 76
End: 2021-01-05

## 2021-01-05 ENCOUNTER — PATIENT MESSAGE (OUTPATIENT)
Dept: FAMILY MEDICINE | Facility: CLINIC | Age: 76
End: 2021-01-05

## 2021-01-05 DIAGNOSIS — Z12.11 COLON CANCER SCREENING: Primary | ICD-10-CM

## 2021-01-06 ENCOUNTER — PATIENT MESSAGE (OUTPATIENT)
Dept: FAMILY MEDICINE | Facility: CLINIC | Age: 76
End: 2021-01-06

## 2021-01-07 DIAGNOSIS — E11.9 TYPE 2 DIABETES MELLITUS WITHOUT COMPLICATION, UNSPECIFIED WHETHER LONG TERM INSULIN USE: ICD-10-CM

## 2021-01-14 ENCOUNTER — TELEPHONE (OUTPATIENT)
Dept: CARDIOLOGY | Facility: CLINIC | Age: 76
End: 2021-01-14

## 2021-01-15 ENCOUNTER — TELEPHONE (OUTPATIENT)
Dept: FAMILY MEDICINE | Facility: CLINIC | Age: 76
End: 2021-01-15

## 2021-01-20 ENCOUNTER — OFFICE VISIT (OUTPATIENT)
Dept: PULMONOLOGY | Facility: CLINIC | Age: 76
End: 2021-01-20
Payer: MEDICARE

## 2021-01-20 VITALS
SYSTOLIC BLOOD PRESSURE: 118 MMHG | BODY MASS INDEX: 31.16 KG/M2 | TEMPERATURE: 97 F | HEART RATE: 96 BPM | WEIGHT: 187 LBS | DIASTOLIC BLOOD PRESSURE: 53 MMHG | OXYGEN SATURATION: 84 % | HEIGHT: 65 IN

## 2021-01-20 DIAGNOSIS — D64.9 ANEMIA, UNSPECIFIED TYPE: ICD-10-CM

## 2021-01-20 DIAGNOSIS — I48.91 ATRIAL FIBRILLATION, UNSPECIFIED TYPE: Primary | ICD-10-CM

## 2021-01-20 DIAGNOSIS — R06.00 DYSPNEA, UNSPECIFIED TYPE: ICD-10-CM

## 2021-01-20 DIAGNOSIS — R09.02 HYPOXEMIA: ICD-10-CM

## 2021-01-20 DIAGNOSIS — I50.22 CHRONIC SYSTOLIC HEART FAILURE: ICD-10-CM

## 2021-01-20 PROCEDURE — 3074F PR MOST RECENT SYSTOLIC BLOOD PRESSURE < 130 MM HG: ICD-10-PCS | Mod: S$GLB,,, | Performed by: NURSE PRACTITIONER

## 2021-01-20 PROCEDURE — 99214 OFFICE O/P EST MOD 30 MIN: CPT | Mod: S$GLB,,, | Performed by: NURSE PRACTITIONER

## 2021-01-20 PROCEDURE — 3074F SYST BP LT 130 MM HG: CPT | Mod: S$GLB,,, | Performed by: NURSE PRACTITIONER

## 2021-01-20 PROCEDURE — 1126F PR PAIN SEVERITY QUANTIFIED, NO PAIN PRESENT: ICD-10-PCS | Mod: S$GLB,,, | Performed by: NURSE PRACTITIONER

## 2021-01-20 PROCEDURE — 3078F PR MOST RECENT DIASTOLIC BLOOD PRESSURE < 80 MM HG: ICD-10-PCS | Mod: S$GLB,,, | Performed by: NURSE PRACTITIONER

## 2021-01-20 PROCEDURE — 99214 PR OFFICE/OUTPT VISIT, EST, LEVL IV, 30-39 MIN: ICD-10-PCS | Mod: S$GLB,,, | Performed by: NURSE PRACTITIONER

## 2021-01-20 PROCEDURE — 1159F PR MEDICATION LIST DOCUMENTED IN MEDICAL RECORD: ICD-10-PCS | Mod: S$GLB,,, | Performed by: NURSE PRACTITIONER

## 2021-01-20 PROCEDURE — 1159F MED LIST DOCD IN RCRD: CPT | Mod: S$GLB,,, | Performed by: NURSE PRACTITIONER

## 2021-01-20 PROCEDURE — 3078F DIAST BP <80 MM HG: CPT | Mod: S$GLB,,, | Performed by: NURSE PRACTITIONER

## 2021-01-20 PROCEDURE — 1126F AMNT PAIN NOTED NONE PRSNT: CPT | Mod: S$GLB,,, | Performed by: NURSE PRACTITIONER

## 2021-01-20 RX ORDER — METFORMIN HYDROCHLORIDE 500 MG/1
TABLET ORAL
COMMUNITY
Start: 2020-12-16 | End: 2021-02-04

## 2021-01-20 RX ORDER — CALCIUM CARBONATE/VITAMIN D3 500-10/5ML
400 LIQUID (ML) ORAL DAILY
COMMUNITY

## 2021-01-20 RX ORDER — AMLODIPINE BESYLATE 5 MG/1
TABLET ORAL
COMMUNITY
End: 2021-03-17

## 2021-01-21 ENCOUNTER — OFFICE VISIT (OUTPATIENT)
Dept: CARDIOLOGY | Facility: CLINIC | Age: 76
End: 2021-01-21
Payer: MEDICARE

## 2021-01-21 ENCOUNTER — PATIENT MESSAGE (OUTPATIENT)
Dept: FAMILY MEDICINE | Facility: CLINIC | Age: 76
End: 2021-01-21

## 2021-01-21 VITALS
WEIGHT: 185 LBS | DIASTOLIC BLOOD PRESSURE: 80 MMHG | RESPIRATION RATE: 16 BRPM | HEIGHT: 64 IN | HEART RATE: 88 BPM | OXYGEN SATURATION: 98 % | BODY MASS INDEX: 31.58 KG/M2 | SYSTOLIC BLOOD PRESSURE: 132 MMHG

## 2021-01-21 DIAGNOSIS — Z98.890 S/P ABLATION OF ATRIAL FIBRILLATION: ICD-10-CM

## 2021-01-21 DIAGNOSIS — E03.9 HYPOTHYROIDISM, UNSPECIFIED TYPE: ICD-10-CM

## 2021-01-21 DIAGNOSIS — I48.0 PAF (PAROXYSMAL ATRIAL FIBRILLATION): Primary | ICD-10-CM

## 2021-01-21 DIAGNOSIS — I42.8 NONISCHEMIC CARDIOMYOPATHY: ICD-10-CM

## 2021-01-21 DIAGNOSIS — E66.09 CLASS 1 OBESITY DUE TO EXCESS CALORIES WITH SERIOUS COMORBIDITY AND BODY MASS INDEX (BMI) OF 33.0 TO 33.9 IN ADULT: ICD-10-CM

## 2021-01-21 DIAGNOSIS — I50.33 ACUTE ON CHRONIC DIASTOLIC HEART FAILURE: ICD-10-CM

## 2021-01-21 DIAGNOSIS — Z79.01 ANTICOAGULANT LONG-TERM USE: ICD-10-CM

## 2021-01-21 DIAGNOSIS — Z86.79 S/P ABLATION OF ATRIAL FIBRILLATION: ICD-10-CM

## 2021-01-21 DIAGNOSIS — N18.30 STAGE 3 CHRONIC KIDNEY DISEASE, UNSPECIFIED WHETHER STAGE 3A OR 3B CKD: ICD-10-CM

## 2021-01-21 DIAGNOSIS — E11.69 HYPERLIPIDEMIA ASSOCIATED WITH TYPE 2 DIABETES MELLITUS: ICD-10-CM

## 2021-01-21 DIAGNOSIS — E78.5 HYPERLIPIDEMIA ASSOCIATED WITH TYPE 2 DIABETES MELLITUS: ICD-10-CM

## 2021-01-21 DIAGNOSIS — I50.23 ACUTE ON CHRONIC SYSTOLIC CONGESTIVE HEART FAILURE: ICD-10-CM

## 2021-01-21 PROCEDURE — 3044F PR MOST RECENT HEMOGLOBIN A1C LEVEL <7.0%: ICD-10-PCS | Mod: CPTII,S$GLB,, | Performed by: INTERNAL MEDICINE

## 2021-01-21 PROCEDURE — 3288F PR FALLS RISK ASSESSMENT DOCUMENTED: ICD-10-PCS | Mod: CPTII,S$GLB,, | Performed by: INTERNAL MEDICINE

## 2021-01-21 PROCEDURE — 1159F MED LIST DOCD IN RCRD: CPT | Mod: S$GLB,,, | Performed by: INTERNAL MEDICINE

## 2021-01-21 PROCEDURE — 3079F DIAST BP 80-89 MM HG: CPT | Mod: CPTII,S$GLB,, | Performed by: INTERNAL MEDICINE

## 2021-01-21 PROCEDURE — 3044F HG A1C LEVEL LT 7.0%: CPT | Mod: CPTII,S$GLB,, | Performed by: INTERNAL MEDICINE

## 2021-01-21 PROCEDURE — 3075F SYST BP GE 130 - 139MM HG: CPT | Mod: CPTII,S$GLB,, | Performed by: INTERNAL MEDICINE

## 2021-01-21 PROCEDURE — 3079F PR MOST RECENT DIASTOLIC BLOOD PRESSURE 80-89 MM HG: ICD-10-PCS | Mod: CPTII,S$GLB,, | Performed by: INTERNAL MEDICINE

## 2021-01-21 PROCEDURE — 99215 PR OFFICE/OUTPT VISIT, EST, LEVL V, 40-54 MIN: ICD-10-PCS | Mod: S$GLB,,, | Performed by: INTERNAL MEDICINE

## 2021-01-21 PROCEDURE — 1100F PTFALLS ASSESS-DOCD GE2>/YR: CPT | Mod: CPTII,S$GLB,, | Performed by: INTERNAL MEDICINE

## 2021-01-21 PROCEDURE — 3288F FALL RISK ASSESSMENT DOCD: CPT | Mod: CPTII,S$GLB,, | Performed by: INTERNAL MEDICINE

## 2021-01-21 PROCEDURE — 1100F PR PT FALLS ASSESS DOC 2+ FALLS/FALL W/INJURY/YR: ICD-10-PCS | Mod: CPTII,S$GLB,, | Performed by: INTERNAL MEDICINE

## 2021-01-21 PROCEDURE — 1159F PR MEDICATION LIST DOCUMENTED IN MEDICAL RECORD: ICD-10-PCS | Mod: S$GLB,,, | Performed by: INTERNAL MEDICINE

## 2021-01-21 PROCEDURE — 1126F PR PAIN SEVERITY QUANTIFIED, NO PAIN PRESENT: ICD-10-PCS | Mod: S$GLB,,, | Performed by: INTERNAL MEDICINE

## 2021-01-21 PROCEDURE — 3075F PR MOST RECENT SYSTOLIC BLOOD PRESS GE 130-139MM HG: ICD-10-PCS | Mod: CPTII,S$GLB,, | Performed by: INTERNAL MEDICINE

## 2021-01-21 PROCEDURE — 99215 OFFICE O/P EST HI 40 MIN: CPT | Mod: S$GLB,,, | Performed by: INTERNAL MEDICINE

## 2021-01-21 PROCEDURE — 1126F AMNT PAIN NOTED NONE PRSNT: CPT | Mod: S$GLB,,, | Performed by: INTERNAL MEDICINE

## 2021-01-21 RX ORDER — BUMETANIDE 1 MG/1
1 TABLET ORAL 2 TIMES DAILY
Qty: 60 TABLET | Refills: 11 | Status: SHIPPED | OUTPATIENT
Start: 2021-01-21 | End: 2021-03-17

## 2021-01-22 ENCOUNTER — TELEPHONE (OUTPATIENT)
Dept: PULMONOLOGY | Facility: CLINIC | Age: 76
End: 2021-01-22

## 2021-01-22 DIAGNOSIS — I48.91 ATRIAL FIBRILLATION, UNSPECIFIED TYPE: Primary | ICD-10-CM

## 2021-01-22 DIAGNOSIS — I50.9 CONGESTIVE HEART FAILURE, UNSPECIFIED HF CHRONICITY, UNSPECIFIED HEART FAILURE TYPE: ICD-10-CM

## 2021-01-22 DIAGNOSIS — J96.11 CHRONIC HYPOXEMIC RESPIRATORY FAILURE: ICD-10-CM

## 2021-01-22 RX ORDER — LEVOTHYROXINE SODIUM 75 UG/1
TABLET ORAL
Qty: 90 TABLET | Refills: 3 | Status: SHIPPED | OUTPATIENT
Start: 2021-01-22 | End: 2021-02-04

## 2021-01-27 ENCOUNTER — TELEPHONE (OUTPATIENT)
Dept: PULMONOLOGY | Facility: CLINIC | Age: 76
End: 2021-01-27

## 2021-01-29 ENCOUNTER — PATIENT MESSAGE (OUTPATIENT)
Dept: PULMONOLOGY | Facility: CLINIC | Age: 76
End: 2021-01-29

## 2021-02-03 ENCOUNTER — TELEPHONE (OUTPATIENT)
Dept: PULMONOLOGY | Facility: CLINIC | Age: 76
End: 2021-02-03

## 2021-02-04 ENCOUNTER — OFFICE VISIT (OUTPATIENT)
Dept: FAMILY MEDICINE | Facility: CLINIC | Age: 76
End: 2021-02-04
Payer: MEDICARE

## 2021-02-04 VITALS
HEIGHT: 65 IN | BODY MASS INDEX: 30.27 KG/M2 | HEART RATE: 90 BPM | OXYGEN SATURATION: 95 % | TEMPERATURE: 98 F | SYSTOLIC BLOOD PRESSURE: 120 MMHG | DIASTOLIC BLOOD PRESSURE: 60 MMHG | WEIGHT: 181.69 LBS | RESPIRATION RATE: 16 BRPM

## 2021-02-04 DIAGNOSIS — I48.19 PERSISTENT ATRIAL FIBRILLATION: ICD-10-CM

## 2021-02-04 DIAGNOSIS — E11.9 DIABETIC EYE EXAM: ICD-10-CM

## 2021-02-04 DIAGNOSIS — N18.30 STAGE 3 CHRONIC KIDNEY DISEASE, UNSPECIFIED WHETHER STAGE 3A OR 3B CKD: Primary | ICD-10-CM

## 2021-02-04 DIAGNOSIS — Z99.81 ON HOME OXYGEN THERAPY: ICD-10-CM

## 2021-02-04 DIAGNOSIS — Z79.01 ANTICOAGULANT LONG-TERM USE: ICD-10-CM

## 2021-02-04 DIAGNOSIS — Z23 FLU VACCINE NEED: ICD-10-CM

## 2021-02-04 DIAGNOSIS — E03.9 HYPOTHYROIDISM, UNSPECIFIED TYPE: ICD-10-CM

## 2021-02-04 DIAGNOSIS — D50.0 IRON DEFICIENCY ANEMIA DUE TO CHRONIC BLOOD LOSS: ICD-10-CM

## 2021-02-04 DIAGNOSIS — I70.0 ABDOMINAL AORTIC ATHEROSCLEROSIS: ICD-10-CM

## 2021-02-04 DIAGNOSIS — E11.69 HYPERLIPIDEMIA ASSOCIATED WITH TYPE 2 DIABETES MELLITUS: ICD-10-CM

## 2021-02-04 DIAGNOSIS — Z01.00 DIABETIC EYE EXAM: ICD-10-CM

## 2021-02-04 DIAGNOSIS — Z12.11 ENCOUNTER FOR FIT (FECAL IMMUNOCHEMICAL TEST) SCREENING: ICD-10-CM

## 2021-02-04 DIAGNOSIS — E11.9 ENCOUNTER FOR DIABETIC FOOT EXAM: ICD-10-CM

## 2021-02-04 DIAGNOSIS — E11.59 HYPERTENSION ASSOCIATED WITH DIABETES: ICD-10-CM

## 2021-02-04 DIAGNOSIS — N95.9 MENOPAUSAL AND POSTMENOPAUSAL DISORDER: ICD-10-CM

## 2021-02-04 DIAGNOSIS — E66.9 OBESITY, CLASS I, BMI 30-34.9: ICD-10-CM

## 2021-02-04 DIAGNOSIS — E78.5 HYPERLIPIDEMIA ASSOCIATED WITH TYPE 2 DIABETES MELLITUS: ICD-10-CM

## 2021-02-04 DIAGNOSIS — R09.89 CARDIAC LV EJECTION FRACTION 10-20%: ICD-10-CM

## 2021-02-04 DIAGNOSIS — E03.9 ACQUIRED HYPOTHYROIDISM: ICD-10-CM

## 2021-02-04 DIAGNOSIS — E11.9 TYPE 2 DIABETES MELLITUS WITHOUT COMPLICATION, WITHOUT LONG-TERM CURRENT USE OF INSULIN: ICD-10-CM

## 2021-02-04 DIAGNOSIS — I50.22 CHRONIC SYSTOLIC HEART FAILURE: ICD-10-CM

## 2021-02-04 DIAGNOSIS — I15.2 HYPERTENSION ASSOCIATED WITH DIABETES: ICD-10-CM

## 2021-02-04 DIAGNOSIS — K59.09 CHRONIC CONSTIPATION: ICD-10-CM

## 2021-02-04 DIAGNOSIS — Z12.31 ENCOUNTER FOR SCREENING MAMMOGRAM FOR MALIGNANT NEOPLASM OF BREAST: ICD-10-CM

## 2021-02-04 DIAGNOSIS — G25.0 BENIGN ESSENTIAL TREMOR: ICD-10-CM

## 2021-02-04 PROCEDURE — 3078F PR MOST RECENT DIASTOLIC BLOOD PRESSURE < 80 MM HG: ICD-10-PCS | Mod: CPTII,S$GLB,, | Performed by: FAMILY MEDICINE

## 2021-02-04 PROCEDURE — 99999 PR PBB SHADOW E&M-EST. PATIENT-LVL V: ICD-10-PCS | Mod: PBBFAC,,, | Performed by: FAMILY MEDICINE

## 2021-02-04 PROCEDURE — 3044F HG A1C LEVEL LT 7.0%: CPT | Mod: CPTII,S$GLB,, | Performed by: FAMILY MEDICINE

## 2021-02-04 PROCEDURE — 3074F SYST BP LT 130 MM HG: CPT | Mod: CPTII,S$GLB,, | Performed by: FAMILY MEDICINE

## 2021-02-04 PROCEDURE — 3044F PR MOST RECENT HEMOGLOBIN A1C LEVEL <7.0%: ICD-10-PCS | Mod: CPTII,S$GLB,, | Performed by: FAMILY MEDICINE

## 2021-02-04 PROCEDURE — G0008 FLU VACCINE - HIGH DOSE (65+) PRESERVATIVE FREE IM: ICD-10-PCS | Mod: S$GLB,,, | Performed by: FAMILY MEDICINE

## 2021-02-04 PROCEDURE — G0008 ADMIN INFLUENZA VIRUS VAC: HCPCS | Mod: S$GLB,,, | Performed by: FAMILY MEDICINE

## 2021-02-04 PROCEDURE — 1101F PR PT FALLS ASSESS DOC 0-1 FALLS W/OUT INJ PAST YR: ICD-10-PCS | Mod: CPTII,S$GLB,, | Performed by: FAMILY MEDICINE

## 2021-02-04 PROCEDURE — 99214 OFFICE O/P EST MOD 30 MIN: CPT | Mod: 25,S$GLB,, | Performed by: FAMILY MEDICINE

## 2021-02-04 PROCEDURE — 1159F MED LIST DOCD IN RCRD: CPT | Mod: S$GLB,,, | Performed by: FAMILY MEDICINE

## 2021-02-04 PROCEDURE — 1101F PT FALLS ASSESS-DOCD LE1/YR: CPT | Mod: CPTII,S$GLB,, | Performed by: FAMILY MEDICINE

## 2021-02-04 PROCEDURE — 3078F DIAST BP <80 MM HG: CPT | Mod: CPTII,S$GLB,, | Performed by: FAMILY MEDICINE

## 2021-02-04 PROCEDURE — 99499 RISK ADDL DX/OHS AUDIT: ICD-10-PCS | Mod: S$GLB,,, | Performed by: FAMILY MEDICINE

## 2021-02-04 PROCEDURE — 1126F PR PAIN SEVERITY QUANTIFIED, NO PAIN PRESENT: ICD-10-PCS | Mod: S$GLB,,, | Performed by: FAMILY MEDICINE

## 2021-02-04 PROCEDURE — 3288F FALL RISK ASSESSMENT DOCD: CPT | Mod: CPTII,S$GLB,, | Performed by: FAMILY MEDICINE

## 2021-02-04 PROCEDURE — 3074F PR MOST RECENT SYSTOLIC BLOOD PRESSURE < 130 MM HG: ICD-10-PCS | Mod: CPTII,S$GLB,, | Performed by: FAMILY MEDICINE

## 2021-02-04 PROCEDURE — 99999 PR PBB SHADOW E&M-EST. PATIENT-LVL V: CPT | Mod: PBBFAC,,, | Performed by: FAMILY MEDICINE

## 2021-02-04 PROCEDURE — 90662 FLU VACCINE - HIGH DOSE (65+) PRESERVATIVE FREE IM: ICD-10-PCS | Mod: S$GLB,,, | Performed by: FAMILY MEDICINE

## 2021-02-04 PROCEDURE — 99214 PR OFFICE/OUTPT VISIT, EST, LEVL IV, 30-39 MIN: ICD-10-PCS | Mod: 25,S$GLB,, | Performed by: FAMILY MEDICINE

## 2021-02-04 PROCEDURE — 3288F PR FALLS RISK ASSESSMENT DOCUMENTED: ICD-10-PCS | Mod: CPTII,S$GLB,, | Performed by: FAMILY MEDICINE

## 2021-02-04 PROCEDURE — 99499 UNLISTED E&M SERVICE: CPT | Mod: S$GLB,,, | Performed by: FAMILY MEDICINE

## 2021-02-04 PROCEDURE — 1159F PR MEDICATION LIST DOCUMENTED IN MEDICAL RECORD: ICD-10-PCS | Mod: S$GLB,,, | Performed by: FAMILY MEDICINE

## 2021-02-04 PROCEDURE — 1126F AMNT PAIN NOTED NONE PRSNT: CPT | Mod: S$GLB,,, | Performed by: FAMILY MEDICINE

## 2021-02-04 PROCEDURE — 90662 IIV NO PRSV INCREASED AG IM: CPT | Mod: S$GLB,,, | Performed by: FAMILY MEDICINE

## 2021-02-04 RX ORDER — POLYETHYLENE GLYCOL 3350 17 G/17G
17 POWDER, FOR SOLUTION ORAL DAILY
Qty: 1700 G | Refills: 3 | Status: SHIPPED | OUTPATIENT
Start: 2021-02-04

## 2021-02-04 RX ORDER — GLIMEPIRIDE 4 MG/1
TABLET ORAL
Qty: 180 TABLET | Refills: 3 | Status: SHIPPED | OUTPATIENT
Start: 2021-02-04

## 2021-02-04 RX ORDER — LEVOTHYROXINE SODIUM 75 UG/1
TABLET ORAL
Qty: 90 TABLET | Refills: 3 | Status: CANCELLED | OUTPATIENT
Start: 2021-02-04

## 2021-02-05 ENCOUNTER — TELEPHONE (OUTPATIENT)
Dept: FAMILY MEDICINE | Facility: CLINIC | Age: 76
End: 2021-02-05

## 2021-02-22 ENCOUNTER — PATIENT MESSAGE (OUTPATIENT)
Dept: FAMILY MEDICINE | Facility: CLINIC | Age: 76
End: 2021-02-22

## 2021-02-23 ENCOUNTER — OFFICE VISIT (OUTPATIENT)
Dept: PODIATRY | Facility: CLINIC | Age: 76
End: 2021-02-23
Payer: MEDICARE

## 2021-02-23 VITALS — WEIGHT: 179 LBS | HEIGHT: 65 IN | BODY MASS INDEX: 29.82 KG/M2

## 2021-02-23 DIAGNOSIS — E11.9 CONTROLLED TYPE 2 DIABETES MELLITUS WITHOUT COMPLICATION, WITHOUT LONG-TERM CURRENT USE OF INSULIN: Primary | ICD-10-CM

## 2021-02-23 DIAGNOSIS — E11.9 ENCOUNTER FOR DIABETIC FOOT EXAM: ICD-10-CM

## 2021-02-23 PROCEDURE — 1100F PTFALLS ASSESS-DOCD GE2>/YR: CPT | Mod: CPTII,S$GLB,, | Performed by: PODIATRIST

## 2021-02-23 PROCEDURE — 1100F PR PT FALLS ASSESS DOC 2+ FALLS/FALL W/INJURY/YR: ICD-10-PCS | Mod: CPTII,S$GLB,, | Performed by: PODIATRIST

## 2021-02-23 PROCEDURE — 1159F PR MEDICATION LIST DOCUMENTED IN MEDICAL RECORD: ICD-10-PCS | Mod: S$GLB,,, | Performed by: PODIATRIST

## 2021-02-23 PROCEDURE — 1126F PR PAIN SEVERITY QUANTIFIED, NO PAIN PRESENT: ICD-10-PCS | Mod: S$GLB,,, | Performed by: PODIATRIST

## 2021-02-23 PROCEDURE — 1126F AMNT PAIN NOTED NONE PRSNT: CPT | Mod: S$GLB,,, | Performed by: PODIATRIST

## 2021-02-23 PROCEDURE — 99203 PR OFFICE/OUTPT VISIT, NEW, LEVL III, 30-44 MIN: ICD-10-PCS | Mod: S$GLB,,, | Performed by: PODIATRIST

## 2021-02-23 PROCEDURE — 3288F PR FALLS RISK ASSESSMENT DOCUMENTED: ICD-10-PCS | Mod: CPTII,S$GLB,, | Performed by: PODIATRIST

## 2021-02-23 PROCEDURE — 3044F PR MOST RECENT HEMOGLOBIN A1C LEVEL <7.0%: ICD-10-PCS | Mod: CPTII,S$GLB,, | Performed by: PODIATRIST

## 2021-02-23 PROCEDURE — 1159F MED LIST DOCD IN RCRD: CPT | Mod: S$GLB,,, | Performed by: PODIATRIST

## 2021-02-23 PROCEDURE — 99203 OFFICE O/P NEW LOW 30 MIN: CPT | Mod: S$GLB,,, | Performed by: PODIATRIST

## 2021-02-23 PROCEDURE — 3044F HG A1C LEVEL LT 7.0%: CPT | Mod: CPTII,S$GLB,, | Performed by: PODIATRIST

## 2021-02-23 PROCEDURE — 3288F FALL RISK ASSESSMENT DOCD: CPT | Mod: CPTII,S$GLB,, | Performed by: PODIATRIST

## 2021-02-23 RX ORDER — DICLOFENAC SODIUM 10 MG/G
GEL TOPICAL
COMMUNITY
End: 2021-03-17

## 2021-02-23 RX ORDER — PROPRANOLOL HYDROCHLORIDE 160 MG/1
160 CAPSULE, EXTENDED RELEASE ORAL DAILY
Status: ON HOLD | COMMUNITY
Start: 2021-02-04 | End: 2021-05-11 | Stop reason: SDUPTHER

## 2021-02-28 ENCOUNTER — PATIENT OUTREACH (OUTPATIENT)
Dept: ADMINISTRATIVE | Facility: OTHER | Age: 76
End: 2021-02-28

## 2021-03-01 ENCOUNTER — OFFICE VISIT (OUTPATIENT)
Dept: OPHTHALMOLOGY | Facility: CLINIC | Age: 76
End: 2021-03-01
Payer: MEDICARE

## 2021-03-01 DIAGNOSIS — Z96.1 PSEUDOPHAKIA, BOTH EYES: ICD-10-CM

## 2021-03-01 DIAGNOSIS — E11.9 DIABETES MELLITUS TYPE 2 WITHOUT RETINOPATHY: Primary | ICD-10-CM

## 2021-03-01 DIAGNOSIS — H04.123 DRY EYE SYNDROME, BILATERAL: ICD-10-CM

## 2021-03-01 PROCEDURE — 1101F PR PT FALLS ASSESS DOC 0-1 FALLS W/OUT INJ PAST YR: ICD-10-PCS | Mod: CPTII,S$GLB,, | Performed by: OPHTHALMOLOGY

## 2021-03-01 PROCEDURE — 92004 COMPRE OPH EXAM NEW PT 1/>: CPT | Mod: S$GLB,,, | Performed by: OPHTHALMOLOGY

## 2021-03-01 PROCEDURE — 3288F PR FALLS RISK ASSESSMENT DOCUMENTED: ICD-10-PCS | Mod: CPTII,S$GLB,, | Performed by: OPHTHALMOLOGY

## 2021-03-01 PROCEDURE — 1126F PR PAIN SEVERITY QUANTIFIED, NO PAIN PRESENT: ICD-10-PCS | Mod: S$GLB,,, | Performed by: OPHTHALMOLOGY

## 2021-03-01 PROCEDURE — 92004 PR EYE EXAM, NEW PATIENT,COMPREHESV: ICD-10-PCS | Mod: S$GLB,,, | Performed by: OPHTHALMOLOGY

## 2021-03-01 PROCEDURE — 2023F DILAT RTA XM W/O RTNOPTHY: CPT | Mod: S$GLB,,, | Performed by: OPHTHALMOLOGY

## 2021-03-01 PROCEDURE — 1126F AMNT PAIN NOTED NONE PRSNT: CPT | Mod: S$GLB,,, | Performed by: OPHTHALMOLOGY

## 2021-03-01 PROCEDURE — 2023F PR DILATED RETINAL EXAM W/O EVID OF RETINOPATHY: ICD-10-PCS | Mod: S$GLB,,, | Performed by: OPHTHALMOLOGY

## 2021-03-01 PROCEDURE — 3288F FALL RISK ASSESSMENT DOCD: CPT | Mod: CPTII,S$GLB,, | Performed by: OPHTHALMOLOGY

## 2021-03-01 PROCEDURE — 99999 PR PBB SHADOW E&M-EST. PATIENT-LVL III: CPT | Mod: PBBFAC,,, | Performed by: OPHTHALMOLOGY

## 2021-03-01 PROCEDURE — 1101F PT FALLS ASSESS-DOCD LE1/YR: CPT | Mod: CPTII,S$GLB,, | Performed by: OPHTHALMOLOGY

## 2021-03-01 PROCEDURE — 99999 PR PBB SHADOW E&M-EST. PATIENT-LVL III: ICD-10-PCS | Mod: PBBFAC,,, | Performed by: OPHTHALMOLOGY

## 2021-03-03 ENCOUNTER — OFFICE VISIT (OUTPATIENT)
Dept: PULMONOLOGY | Facility: CLINIC | Age: 76
End: 2021-03-03
Payer: MEDICARE

## 2021-03-03 VITALS
WEIGHT: 180 LBS | OXYGEN SATURATION: 90 % | TEMPERATURE: 98 F | DIASTOLIC BLOOD PRESSURE: 60 MMHG | SYSTOLIC BLOOD PRESSURE: 120 MMHG | BODY MASS INDEX: 29.99 KG/M2 | HEIGHT: 65 IN | HEART RATE: 66 BPM

## 2021-03-03 DIAGNOSIS — J96.11 CHRONIC HYPOXEMIC RESPIRATORY FAILURE: ICD-10-CM

## 2021-03-03 DIAGNOSIS — I48.0 PAF (PAROXYSMAL ATRIAL FIBRILLATION): ICD-10-CM

## 2021-03-03 DIAGNOSIS — I50.9 CONGESTIVE HEART FAILURE, UNSPECIFIED HF CHRONICITY, UNSPECIFIED HEART FAILURE TYPE: ICD-10-CM

## 2021-03-03 DIAGNOSIS — G47.34 NOCTURNAL HYPOXEMIA: Primary | ICD-10-CM

## 2021-03-03 PROCEDURE — 1159F MED LIST DOCD IN RCRD: CPT | Mod: S$GLB,,, | Performed by: NURSE PRACTITIONER

## 2021-03-03 PROCEDURE — 1126F AMNT PAIN NOTED NONE PRSNT: CPT | Mod: S$GLB,,, | Performed by: NURSE PRACTITIONER

## 2021-03-03 PROCEDURE — 3078F DIAST BP <80 MM HG: CPT | Mod: S$GLB,,, | Performed by: NURSE PRACTITIONER

## 2021-03-03 PROCEDURE — 3074F SYST BP LT 130 MM HG: CPT | Mod: S$GLB,,, | Performed by: NURSE PRACTITIONER

## 2021-03-03 PROCEDURE — 1126F PR PAIN SEVERITY QUANTIFIED, NO PAIN PRESENT: ICD-10-PCS | Mod: S$GLB,,, | Performed by: NURSE PRACTITIONER

## 2021-03-03 PROCEDURE — 1159F PR MEDICATION LIST DOCUMENTED IN MEDICAL RECORD: ICD-10-PCS | Mod: S$GLB,,, | Performed by: NURSE PRACTITIONER

## 2021-03-03 PROCEDURE — 3074F PR MOST RECENT SYSTOLIC BLOOD PRESSURE < 130 MM HG: ICD-10-PCS | Mod: S$GLB,,, | Performed by: NURSE PRACTITIONER

## 2021-03-03 PROCEDURE — 99214 PR OFFICE/OUTPT VISIT, EST, LEVL IV, 30-39 MIN: ICD-10-PCS | Mod: S$GLB,,, | Performed by: NURSE PRACTITIONER

## 2021-03-03 PROCEDURE — 3078F PR MOST RECENT DIASTOLIC BLOOD PRESSURE < 80 MM HG: ICD-10-PCS | Mod: S$GLB,,, | Performed by: NURSE PRACTITIONER

## 2021-03-03 PROCEDURE — 99214 OFFICE O/P EST MOD 30 MIN: CPT | Mod: S$GLB,,, | Performed by: NURSE PRACTITIONER

## 2021-03-05 ENCOUNTER — IMMUNIZATION (OUTPATIENT)
Dept: FAMILY MEDICINE | Facility: CLINIC | Age: 76
End: 2021-03-05
Payer: MEDICARE

## 2021-03-05 DIAGNOSIS — Z23 NEED FOR VACCINATION: Primary | ICD-10-CM

## 2021-03-05 PROCEDURE — 91300 COVID-19, MRNA, LNP-S, PF, 30 MCG/0.3 ML DOSE VACCINE: ICD-10-PCS | Mod: ,,, | Performed by: FAMILY MEDICINE

## 2021-03-05 PROCEDURE — 0001A COVID-19, MRNA, LNP-S, PF, 30 MCG/0.3 ML DOSE VACCINE: CPT | Mod: CV19,,, | Performed by: FAMILY MEDICINE

## 2021-03-05 PROCEDURE — 0001A COVID-19, MRNA, LNP-S, PF, 30 MCG/0.3 ML DOSE VACCINE: ICD-10-PCS | Mod: CV19,,, | Performed by: FAMILY MEDICINE

## 2021-03-05 PROCEDURE — 91300 COVID-19, MRNA, LNP-S, PF, 30 MCG/0.3 ML DOSE VACCINE: CPT | Mod: ,,, | Performed by: FAMILY MEDICINE

## 2021-03-06 ENCOUNTER — LAB VISIT (OUTPATIENT)
Dept: LAB | Facility: HOSPITAL | Age: 76
End: 2021-03-06
Attending: FAMILY MEDICINE
Payer: MEDICARE

## 2021-03-06 DIAGNOSIS — Z12.11 ENCOUNTER FOR FIT (FECAL IMMUNOCHEMICAL TEST) SCREENING: ICD-10-CM

## 2021-03-06 PROCEDURE — 82274 ASSAY TEST FOR BLOOD FECAL: CPT | Performed by: FAMILY MEDICINE

## 2021-03-10 ENCOUNTER — PATIENT MESSAGE (OUTPATIENT)
Dept: ADMINISTRATIVE | Facility: HOSPITAL | Age: 76
End: 2021-03-10

## 2021-03-12 ENCOUNTER — PATIENT OUTREACH (OUTPATIENT)
Dept: ADMINISTRATIVE | Facility: OTHER | Age: 76
End: 2021-03-12

## 2021-03-17 ENCOUNTER — HOSPITAL ENCOUNTER (INPATIENT)
Facility: HOSPITAL | Age: 76
LOS: 1 days | Discharge: HOME | DRG: 177 | End: 2021-03-20
Attending: EMERGENCY MEDICINE | Admitting: INTERNAL MEDICINE
Payer: MEDICARE

## 2021-03-17 DIAGNOSIS — I50.22 CHRONIC SYSTOLIC HEART FAILURE: ICD-10-CM

## 2021-03-17 DIAGNOSIS — U07.1 PNEUMONIA DUE TO COVID-19 VIRUS: ICD-10-CM

## 2021-03-17 DIAGNOSIS — J96.11 CHRONIC RESPIRATORY FAILURE WITH HYPOXIA: Primary | ICD-10-CM

## 2021-03-17 DIAGNOSIS — J12.82 PNEUMONIA DUE TO COVID-19 VIRUS: ICD-10-CM

## 2021-03-17 DIAGNOSIS — R06.02 SHORTNESS OF BREATH: ICD-10-CM

## 2021-03-17 PROBLEM — R79.89 ELEVATED TROPONIN: Status: ACTIVE | Noted: 2021-03-17

## 2021-03-17 LAB
ALBUMIN SERPL BCP-MCNC: 4.3 G/DL (ref 3.5–5.2)
ALP SERPL-CCNC: 62 U/L (ref 55–135)
ALT SERPL W/O P-5'-P-CCNC: 18 U/L (ref 10–44)
ANION GAP SERPL CALC-SCNC: 9 MMOL/L (ref 8–16)
AST SERPL-CCNC: 23 U/L (ref 10–40)
BASOPHILS # BLD AUTO: 0.01 K/UL (ref 0–0.2)
BASOPHILS NFR BLD: 0.2 % (ref 0–1.9)
BILIRUB SERPL-MCNC: 0.9 MG/DL (ref 0.1–1)
BNP SERPL-MCNC: 2216 PG/ML (ref 0–99)
BUN SERPL-MCNC: 41 MG/DL (ref 8–23)
CALCIUM SERPL-MCNC: 9.6 MG/DL (ref 8.7–10.5)
CHLORIDE SERPL-SCNC: 96 MMOL/L (ref 95–110)
CO2 SERPL-SCNC: 31 MMOL/L (ref 23–29)
CREAT SERPL-MCNC: 1.4 MG/DL (ref 0.5–1.4)
D DIMER PPP IA.FEU-MCNC: 0.3 UG/ML FEU
DIFFERENTIAL METHOD: ABNORMAL
EOSINOPHIL # BLD AUTO: 0.1 K/UL (ref 0–0.5)
EOSINOPHIL NFR BLD: 1.4 % (ref 0–8)
ERYTHROCYTE [DISTWIDTH] IN BLOOD BY AUTOMATED COUNT: 14.6 % (ref 11.5–14.5)
EST. GFR  (AFRICAN AMERICAN): 42.4 ML/MIN/1.73 M^2
EST. GFR  (NON AFRICAN AMERICAN): 36.8 ML/MIN/1.73 M^2
FERRITIN SERPL-MCNC: 52 NG/ML (ref 20–300)
GLUCOSE SERPL-MCNC: 186 MG/DL (ref 70–110)
HCT VFR BLD AUTO: 34 % (ref 37–48.5)
HEMOCCULT STL QL IA: NEGATIVE
HGB BLD-MCNC: 10.8 G/DL (ref 12–16)
IMM GRANULOCYTES # BLD AUTO: 0.02 K/UL (ref 0–0.04)
IMM GRANULOCYTES NFR BLD AUTO: 0.4 % (ref 0–0.5)
INR PPP: 1.6
LDH SERPL L TO P-CCNC: 188 U/L (ref 110–260)
LYMPHOCYTES # BLD AUTO: 1.1 K/UL (ref 1–4.8)
LYMPHOCYTES NFR BLD: 22 % (ref 18–48)
MCH RBC QN AUTO: 29.8 PG (ref 27–31)
MCHC RBC AUTO-ENTMCNC: 31.8 G/DL (ref 32–36)
MCV RBC AUTO: 94 FL (ref 82–98)
MONOCYTES # BLD AUTO: 0.9 K/UL (ref 0.3–1)
MONOCYTES NFR BLD: 17.9 % (ref 4–15)
NEUTROPHILS # BLD AUTO: 2.9 K/UL (ref 1.8–7.7)
NEUTROPHILS NFR BLD: 58.1 % (ref 38–73)
NRBC BLD-RTO: 0 /100 WBC
PLATELET # BLD AUTO: 205 K/UL (ref 150–350)
PMV BLD AUTO: 10.3 FL (ref 9.2–12.9)
POTASSIUM SERPL-SCNC: 4.5 MMOL/L (ref 3.5–5.1)
PROT SERPL-MCNC: 7.2 G/DL (ref 6–8.4)
PROTHROMBIN TIME: 18.6 SEC (ref 10.6–14.8)
RBC # BLD AUTO: 3.63 M/UL (ref 4–5.4)
SARS-COV-2 RDRP RESP QL NAA+PROBE: POSITIVE
SODIUM SERPL-SCNC: 136 MMOL/L (ref 136–145)
TROPONIN I SERPL DL<=0.01 NG/ML-MCNC: 0.04 NG/ML
WBC # BLD AUTO: 5.04 K/UL (ref 3.9–12.7)

## 2021-03-17 PROCEDURE — G0378 HOSPITAL OBSERVATION PER HR: HCPCS

## 2021-03-17 PROCEDURE — 93010 EKG 12-LEAD: ICD-10-PCS | Mod: ,,, | Performed by: SPECIALIST

## 2021-03-17 PROCEDURE — 36415 COLL VENOUS BLD VENIPUNCTURE: CPT | Performed by: EMERGENCY MEDICINE

## 2021-03-17 PROCEDURE — 82728 ASSAY OF FERRITIN: CPT | Performed by: EMERGENCY MEDICINE

## 2021-03-17 PROCEDURE — 27000221 HC OXYGEN, UP TO 24 HOURS

## 2021-03-17 PROCEDURE — 25000003 PHARM REV CODE 250: Performed by: EMERGENCY MEDICINE

## 2021-03-17 PROCEDURE — 99285 EMERGENCY DEPT VISIT HI MDM: CPT | Mod: 25

## 2021-03-17 PROCEDURE — U0002 COVID-19 LAB TEST NON-CDC: HCPCS | Performed by: EMERGENCY MEDICINE

## 2021-03-17 PROCEDURE — 80053 COMPREHEN METABOLIC PANEL: CPT | Performed by: EMERGENCY MEDICINE

## 2021-03-17 PROCEDURE — 83615 LACTATE (LD) (LDH) ENZYME: CPT | Performed by: EMERGENCY MEDICINE

## 2021-03-17 PROCEDURE — 93010 ELECTROCARDIOGRAM REPORT: CPT | Mod: ,,, | Performed by: SPECIALIST

## 2021-03-17 PROCEDURE — 84484 ASSAY OF TROPONIN QUANT: CPT | Performed by: EMERGENCY MEDICINE

## 2021-03-17 PROCEDURE — 83880 ASSAY OF NATRIURETIC PEPTIDE: CPT | Performed by: EMERGENCY MEDICINE

## 2021-03-17 PROCEDURE — 85379 FIBRIN DEGRADATION QUANT: CPT | Performed by: EMERGENCY MEDICINE

## 2021-03-17 PROCEDURE — 25000003 PHARM REV CODE 250: Performed by: INTERNAL MEDICINE

## 2021-03-17 PROCEDURE — 96374 THER/PROPH/DIAG INJ IV PUSH: CPT

## 2021-03-17 PROCEDURE — 93005 ELECTROCARDIOGRAM TRACING: CPT | Performed by: SPECIALIST

## 2021-03-17 PROCEDURE — S0171 BUMETANIDE 0.5 MG: HCPCS | Performed by: EMERGENCY MEDICINE

## 2021-03-17 PROCEDURE — 85025 COMPLETE CBC W/AUTO DIFF WBC: CPT | Performed by: EMERGENCY MEDICINE

## 2021-03-17 PROCEDURE — 85610 PROTHROMBIN TIME: CPT | Performed by: EMERGENCY MEDICINE

## 2021-03-17 RX ORDER — SODIUM CHLORIDE 0.9 % (FLUSH) 0.9 %
10 SYRINGE (ML) INJECTION
Status: DISCONTINUED | OUTPATIENT
Start: 2021-03-17 | End: 2021-03-20 | Stop reason: HOSPADM

## 2021-03-17 RX ORDER — BUMETANIDE 0.5 MG/1
0.5 TABLET ORAL DAILY
Status: DISCONTINUED | OUTPATIENT
Start: 2021-03-18 | End: 2021-03-19

## 2021-03-17 RX ORDER — SPIRONOLACTONE 25 MG/1
25 TABLET ORAL DAILY
Status: DISCONTINUED | OUTPATIENT
Start: 2021-03-18 | End: 2021-03-19

## 2021-03-17 RX ORDER — BUMETANIDE 0.25 MG/ML
1 INJECTION INTRAMUSCULAR; INTRAVENOUS DAILY
Status: DISCONTINUED | OUTPATIENT
Start: 2021-03-18 | End: 2021-03-17

## 2021-03-17 RX ORDER — GUAIFENESIN/DEXTROMETHORPHAN 100-10MG/5
10 SYRUP ORAL EVERY 4 HOURS PRN
Status: DISCONTINUED | OUTPATIENT
Start: 2021-03-17 | End: 2021-03-20 | Stop reason: HOSPADM

## 2021-03-17 RX ORDER — LEVOTHYROXINE SODIUM 25 UG/1
75 TABLET ORAL
Status: DISCONTINUED | OUTPATIENT
Start: 2021-03-18 | End: 2021-03-20 | Stop reason: HOSPADM

## 2021-03-17 RX ORDER — PROPRANOLOL HYDROCHLORIDE 80 MG/1
160 CAPSULE, EXTENDED RELEASE ORAL DAILY
Status: DISCONTINUED | OUTPATIENT
Start: 2021-03-18 | End: 2021-03-20 | Stop reason: HOSPADM

## 2021-03-17 RX ORDER — ALBUTEROL SULFATE 90 UG/1
2 AEROSOL, METERED RESPIRATORY (INHALATION) EVERY 6 HOURS
Status: DISCONTINUED | OUTPATIENT
Start: 2021-03-18 | End: 2021-03-17

## 2021-03-17 RX ORDER — IBUPROFEN 200 MG
24 TABLET ORAL
Status: DISCONTINUED | OUTPATIENT
Start: 2021-03-17 | End: 2021-03-20 | Stop reason: HOSPADM

## 2021-03-17 RX ORDER — GLUCAGON 1 MG
1 KIT INJECTION
Status: DISCONTINUED | OUTPATIENT
Start: 2021-03-17 | End: 2021-03-20 | Stop reason: HOSPADM

## 2021-03-17 RX ORDER — IBUPROFEN 200 MG
16 TABLET ORAL
Status: DISCONTINUED | OUTPATIENT
Start: 2021-03-17 | End: 2021-03-20 | Stop reason: HOSPADM

## 2021-03-17 RX ORDER — ALBUTEROL SULFATE 90 UG/1
2 AEROSOL, METERED RESPIRATORY (INHALATION) EVERY 6 HOURS PRN
Status: DISCONTINUED | OUTPATIENT
Start: 2021-03-17 | End: 2021-03-20 | Stop reason: HOSPADM

## 2021-03-17 RX ORDER — PANTOPRAZOLE SODIUM 40 MG/1
40 TABLET, DELAYED RELEASE ORAL DAILY
Status: DISCONTINUED | OUTPATIENT
Start: 2021-03-18 | End: 2021-03-20 | Stop reason: HOSPADM

## 2021-03-17 RX ORDER — ACETAMINOPHEN 325 MG/1
650 TABLET ORAL NIGHTLY
Status: DISCONTINUED | OUTPATIENT
Start: 2021-03-17 | End: 2021-03-20 | Stop reason: HOSPADM

## 2021-03-17 RX ORDER — ASCORBIC ACID 500 MG
500 TABLET ORAL 2 TIMES DAILY
Status: DISCONTINUED | OUTPATIENT
Start: 2021-03-17 | End: 2021-03-20 | Stop reason: HOSPADM

## 2021-03-17 RX ORDER — LEVOTHYROXINE SODIUM 75 UG/1
75 TABLET ORAL
COMMUNITY

## 2021-03-17 RX ADMIN — BUMETANIDE 1 MG: 0.25 INJECTION INTRAMUSCULAR; INTRAVENOUS at 08:03

## 2021-03-17 RX ADMIN — ACETAMINOPHEN 650 MG: 325 TABLET, FILM COATED ORAL at 09:03

## 2021-03-17 RX ADMIN — SACUBITRIL AND VALSARTAN 1 TABLET: 49; 51 TABLET, FILM COATED ORAL at 09:03

## 2021-03-17 RX ADMIN — APIXABAN 5 MG: 5 TABLET, FILM COATED ORAL at 09:03

## 2021-03-17 RX ADMIN — OXYCODONE HYDROCHLORIDE AND ACETAMINOPHEN 500 MG: 500 TABLET ORAL at 09:03

## 2021-03-18 LAB
ALBUMIN SERPL BCP-MCNC: 3.9 G/DL (ref 3.5–5.2)
ALP SERPL-CCNC: 60 U/L (ref 55–135)
ALT SERPL W/O P-5'-P-CCNC: 17 U/L (ref 10–44)
ANION GAP SERPL CALC-SCNC: 11 MMOL/L (ref 8–16)
AST SERPL-CCNC: 23 U/L (ref 10–40)
BASOPHILS # BLD AUTO: 0.02 K/UL (ref 0–0.2)
BASOPHILS NFR BLD: 0.5 % (ref 0–1.9)
BILIRUB SERPL-MCNC: 0.8 MG/DL (ref 0.1–1)
BUN SERPL-MCNC: 40 MG/DL (ref 8–23)
CALCIUM SERPL-MCNC: 9.3 MG/DL (ref 8.7–10.5)
CHLORIDE SERPL-SCNC: 97 MMOL/L (ref 95–110)
CO2 SERPL-SCNC: 33 MMOL/L (ref 23–29)
CREAT SERPL-MCNC: 1.3 MG/DL (ref 0.5–1.4)
CRP SERPL-MCNC: 1.23 MG/DL
DIFFERENTIAL METHOD: ABNORMAL
EOSINOPHIL # BLD AUTO: 0 K/UL (ref 0–0.5)
EOSINOPHIL NFR BLD: 0.7 % (ref 0–8)
ERYTHROCYTE [DISTWIDTH] IN BLOOD BY AUTOMATED COUNT: 14.7 % (ref 11.5–14.5)
EST. GFR  (AFRICAN AMERICAN): 46.4 ML/MIN/1.73 M^2
EST. GFR  (NON AFRICAN AMERICAN): 40.2 ML/MIN/1.73 M^2
GLUCOSE SERPL-MCNC: 130 MG/DL (ref 70–110)
HCT VFR BLD AUTO: 33 % (ref 37–48.5)
HGB BLD-MCNC: 10.3 G/DL (ref 12–16)
IMM GRANULOCYTES # BLD AUTO: 0.01 K/UL (ref 0–0.04)
IMM GRANULOCYTES NFR BLD AUTO: 0.2 % (ref 0–0.5)
LYMPHOCYTES # BLD AUTO: 1.3 K/UL (ref 1–4.8)
LYMPHOCYTES NFR BLD: 29.3 % (ref 18–48)
MAGNESIUM SERPL-MCNC: 2.2 MG/DL (ref 1.6–2.6)
MCH RBC QN AUTO: 29.5 PG (ref 27–31)
MCHC RBC AUTO-ENTMCNC: 31.2 G/DL (ref 32–36)
MCV RBC AUTO: 95 FL (ref 82–98)
MONOCYTES # BLD AUTO: 0.7 K/UL (ref 0.3–1)
MONOCYTES NFR BLD: 16.4 % (ref 4–15)
NEUTROPHILS # BLD AUTO: 2.4 K/UL (ref 1.8–7.7)
NEUTROPHILS NFR BLD: 52.9 % (ref 38–73)
NRBC BLD-RTO: 0 /100 WBC
PLATELET # BLD AUTO: 187 K/UL (ref 150–350)
PMV BLD AUTO: 10.2 FL (ref 9.2–12.9)
POTASSIUM SERPL-SCNC: 4.2 MMOL/L (ref 3.5–5.1)
PROT SERPL-MCNC: 6.8 G/DL (ref 6–8.4)
RBC # BLD AUTO: 3.49 M/UL (ref 4–5.4)
SODIUM SERPL-SCNC: 141 MMOL/L (ref 136–145)
WBC # BLD AUTO: 4.44 K/UL (ref 3.9–12.7)

## 2021-03-18 PROCEDURE — 83735 ASSAY OF MAGNESIUM: CPT | Performed by: INTERNAL MEDICINE

## 2021-03-18 PROCEDURE — 96375 TX/PRO/DX INJ NEW DRUG ADDON: CPT

## 2021-03-18 PROCEDURE — G0378 HOSPITAL OBSERVATION PER HR: HCPCS

## 2021-03-18 PROCEDURE — 99223 PR INITIAL HOSPITAL CARE,LEVL III: ICD-10-PCS | Mod: ,,, | Performed by: INTERNAL MEDICINE

## 2021-03-18 PROCEDURE — 99223 1ST HOSP IP/OBS HIGH 75: CPT | Mod: ,,, | Performed by: INTERNAL MEDICINE

## 2021-03-18 PROCEDURE — 27000221 HC OXYGEN, UP TO 24 HOURS

## 2021-03-18 PROCEDURE — 86140 C-REACTIVE PROTEIN: CPT | Performed by: INTERNAL MEDICINE

## 2021-03-18 PROCEDURE — 36415 COLL VENOUS BLD VENIPUNCTURE: CPT | Performed by: INTERNAL MEDICINE

## 2021-03-18 PROCEDURE — 99900035 HC TECH TIME PER 15 MIN (STAT)

## 2021-03-18 PROCEDURE — 94761 N-INVAS EAR/PLS OXIMETRY MLT: CPT

## 2021-03-18 PROCEDURE — 25000003 PHARM REV CODE 250: Performed by: INTERNAL MEDICINE

## 2021-03-18 PROCEDURE — 85025 COMPLETE CBC W/AUTO DIFF WBC: CPT | Performed by: INTERNAL MEDICINE

## 2021-03-18 PROCEDURE — 80053 COMPREHEN METABOLIC PANEL: CPT | Performed by: INTERNAL MEDICINE

## 2021-03-18 RX ADMIN — THERA TABS 1 TABLET: TAB at 09:03

## 2021-03-18 RX ADMIN — PROPRANOLOL HYDROCHLORIDE 160 MG: 80 CAPSULE, EXTENDED RELEASE ORAL at 09:03

## 2021-03-18 RX ADMIN — LEVOTHYROXINE SODIUM 75 MCG: 0.03 TABLET ORAL at 05:03

## 2021-03-18 RX ADMIN — ACETAMINOPHEN 650 MG: 325 TABLET, FILM COATED ORAL at 11:03

## 2021-03-18 RX ADMIN — SACUBITRIL AND VALSARTAN 1 TABLET: 49; 51 TABLET, FILM COATED ORAL at 09:03

## 2021-03-18 RX ADMIN — SACUBITRIL AND VALSARTAN 1 TABLET: 49; 51 TABLET, FILM COATED ORAL at 11:03

## 2021-03-18 RX ADMIN — OXYCODONE HYDROCHLORIDE AND ACETAMINOPHEN 500 MG: 500 TABLET ORAL at 11:03

## 2021-03-18 RX ADMIN — BUMETANIDE 0.5 MG: 0.5 TABLET ORAL at 09:03

## 2021-03-18 RX ADMIN — SPIRONOLACTONE 25 MG: 25 TABLET ORAL at 09:03

## 2021-03-18 RX ADMIN — REMDESIVIR 200 MG: 100 INJECTION, POWDER, LYOPHILIZED, FOR SOLUTION INTRAVENOUS at 11:03

## 2021-03-18 RX ADMIN — PANTOPRAZOLE SODIUM 40 MG: 40 TABLET, DELAYED RELEASE ORAL at 09:03

## 2021-03-18 RX ADMIN — OXYCODONE HYDROCHLORIDE AND ACETAMINOPHEN 500 MG: 500 TABLET ORAL at 09:03

## 2021-03-18 RX ADMIN — APIXABAN 5 MG: 5 TABLET, FILM COATED ORAL at 09:03

## 2021-03-19 LAB
ALBUMIN SERPL BCP-MCNC: 4 G/DL (ref 3.5–5.2)
ALBUMIN SERPL BCP-MCNC: 4 G/DL (ref 3.5–5.2)
ALP SERPL-CCNC: 58 U/L (ref 55–135)
ALP SERPL-CCNC: 58 U/L (ref 55–135)
ALT SERPL W/O P-5'-P-CCNC: 19 U/L (ref 10–44)
ALT SERPL W/O P-5'-P-CCNC: 19 U/L (ref 10–44)
ANION GAP SERPL CALC-SCNC: 12 MMOL/L (ref 8–16)
ANION GAP SERPL CALC-SCNC: 12 MMOL/L (ref 8–16)
AST SERPL-CCNC: 21 U/L (ref 10–40)
AST SERPL-CCNC: 21 U/L (ref 10–40)
BASOPHILS # BLD AUTO: 0.01 K/UL (ref 0–0.2)
BASOPHILS NFR BLD: 0.2 % (ref 0–1.9)
BILIRUB SERPL-MCNC: 1 MG/DL (ref 0.1–1)
BILIRUB SERPL-MCNC: 1 MG/DL (ref 0.1–1)
BUN SERPL-MCNC: 40 MG/DL (ref 8–23)
BUN SERPL-MCNC: 40 MG/DL (ref 8–23)
CALCIUM SERPL-MCNC: 9.2 MG/DL (ref 8.7–10.5)
CALCIUM SERPL-MCNC: 9.2 MG/DL (ref 8.7–10.5)
CHLORIDE SERPL-SCNC: 95 MMOL/L (ref 95–110)
CHLORIDE SERPL-SCNC: 95 MMOL/L (ref 95–110)
CO2 SERPL-SCNC: 32 MMOL/L (ref 23–29)
CO2 SERPL-SCNC: 32 MMOL/L (ref 23–29)
CREAT SERPL-MCNC: 1.4 MG/DL (ref 0.5–1.4)
CREAT SERPL-MCNC: 1.4 MG/DL (ref 0.5–1.4)
DIFFERENTIAL METHOD: ABNORMAL
EOSINOPHIL # BLD AUTO: 0.1 K/UL (ref 0–0.5)
EOSINOPHIL NFR BLD: 1.3 % (ref 0–8)
ERYTHROCYTE [DISTWIDTH] IN BLOOD BY AUTOMATED COUNT: 14.5 % (ref 11.5–14.5)
EST. GFR  (AFRICAN AMERICAN): 42.4 ML/MIN/1.73 M^2
EST. GFR  (AFRICAN AMERICAN): 42.4 ML/MIN/1.73 M^2
EST. GFR  (NON AFRICAN AMERICAN): 36.8 ML/MIN/1.73 M^2
EST. GFR  (NON AFRICAN AMERICAN): 36.8 ML/MIN/1.73 M^2
GLUCOSE SERPL-MCNC: 153 MG/DL (ref 70–110)
GLUCOSE SERPL-MCNC: 153 MG/DL (ref 70–110)
HCT VFR BLD AUTO: 35.5 % (ref 37–48.5)
HGB BLD-MCNC: 10.7 G/DL (ref 12–16)
IMM GRANULOCYTES # BLD AUTO: 0.02 K/UL (ref 0–0.04)
IMM GRANULOCYTES NFR BLD AUTO: 0.4 % (ref 0–0.5)
LYMPHOCYTES # BLD AUTO: 1.6 K/UL (ref 1–4.8)
LYMPHOCYTES NFR BLD: 31.2 % (ref 18–48)
MAGNESIUM SERPL-MCNC: 2.2 MG/DL (ref 1.6–2.6)
MCH RBC QN AUTO: 29.1 PG (ref 27–31)
MCHC RBC AUTO-ENTMCNC: 30.1 G/DL (ref 32–36)
MCV RBC AUTO: 97 FL (ref 82–98)
MONOCYTES # BLD AUTO: 0.7 K/UL (ref 0.3–1)
MONOCYTES NFR BLD: 13.1 % (ref 4–15)
NEUTROPHILS # BLD AUTO: 2.8 K/UL (ref 1.8–7.7)
NEUTROPHILS NFR BLD: 53.8 % (ref 38–73)
NRBC BLD-RTO: 0 /100 WBC
PLATELET # BLD AUTO: 182 K/UL (ref 150–350)
PMV BLD AUTO: 10.4 FL (ref 9.2–12.9)
POTASSIUM SERPL-SCNC: 4.4 MMOL/L (ref 3.5–5.1)
POTASSIUM SERPL-SCNC: 4.4 MMOL/L (ref 3.5–5.1)
PROT SERPL-MCNC: 7 G/DL (ref 6–8.4)
PROT SERPL-MCNC: 7 G/DL (ref 6–8.4)
RBC # BLD AUTO: 3.68 M/UL (ref 4–5.4)
SODIUM SERPL-SCNC: 139 MMOL/L (ref 136–145)
SODIUM SERPL-SCNC: 139 MMOL/L (ref 136–145)
WBC # BLD AUTO: 5.2 K/UL (ref 3.9–12.7)

## 2021-03-19 PROCEDURE — 36415 COLL VENOUS BLD VENIPUNCTURE: CPT | Performed by: INTERNAL MEDICINE

## 2021-03-19 PROCEDURE — 25000242 PHARM REV CODE 250 ALT 637 W/ HCPCS: Performed by: INTERNAL MEDICINE

## 2021-03-19 PROCEDURE — 96376 TX/PRO/DX INJ SAME DRUG ADON: CPT

## 2021-03-19 PROCEDURE — 25000003 PHARM REV CODE 250: Performed by: INTERNAL MEDICINE

## 2021-03-19 PROCEDURE — 94761 N-INVAS EAR/PLS OXIMETRY MLT: CPT

## 2021-03-19 PROCEDURE — 80053 COMPREHEN METABOLIC PANEL: CPT | Performed by: INTERNAL MEDICINE

## 2021-03-19 PROCEDURE — S0171 BUMETANIDE 0.5 MG: HCPCS | Performed by: INTERNAL MEDICINE

## 2021-03-19 PROCEDURE — 83735 ASSAY OF MAGNESIUM: CPT | Performed by: INTERNAL MEDICINE

## 2021-03-19 PROCEDURE — 85025 COMPLETE CBC W/AUTO DIFF WBC: CPT | Performed by: INTERNAL MEDICINE

## 2021-03-19 PROCEDURE — 27000221 HC OXYGEN, UP TO 24 HOURS

## 2021-03-19 PROCEDURE — 21400001 HC TELEMETRY ROOM

## 2021-03-19 PROCEDURE — 99900035 HC TECH TIME PER 15 MIN (STAT)

## 2021-03-19 PROCEDURE — 63600175 PHARM REV CODE 636 W HCPCS: Performed by: INTERNAL MEDICINE

## 2021-03-19 PROCEDURE — 96372 THER/PROPH/DIAG INJ SC/IM: CPT

## 2021-03-19 RX ORDER — BUMETANIDE 0.25 MG/ML
1 INJECTION INTRAMUSCULAR; INTRAVENOUS DAILY
Status: DISCONTINUED | OUTPATIENT
Start: 2021-03-19 | End: 2021-03-20 | Stop reason: HOSPADM

## 2021-03-19 RX ORDER — ENOXAPARIN SODIUM 100 MG/ML
40 INJECTION SUBCUTANEOUS
Status: DISCONTINUED | OUTPATIENT
Start: 2021-03-19 | End: 2021-03-20 | Stop reason: HOSPADM

## 2021-03-19 RX ORDER — ALPRAZOLAM 0.25 MG/1
0.25 TABLET ORAL 3 TIMES DAILY PRN
Status: DISCONTINUED | OUTPATIENT
Start: 2021-03-19 | End: 2021-03-20 | Stop reason: HOSPADM

## 2021-03-19 RX ADMIN — OXYCODONE HYDROCHLORIDE AND ACETAMINOPHEN 500 MG: 500 TABLET ORAL at 08:03

## 2021-03-19 RX ADMIN — BUMETANIDE 0.5 MG: 0.5 TABLET ORAL at 08:03

## 2021-03-19 RX ADMIN — SPIRONOLACTONE 25 MG: 25 TABLET ORAL at 08:03

## 2021-03-19 RX ADMIN — PROPRANOLOL HYDROCHLORIDE 160 MG: 80 CAPSULE, EXTENDED RELEASE ORAL at 08:03

## 2021-03-19 RX ADMIN — REMDESIVIR 100 MG: 100 INJECTION, POWDER, LYOPHILIZED, FOR SOLUTION INTRAVENOUS at 08:03

## 2021-03-19 RX ADMIN — DEXAMETHASONE 6 MG: 4 TABLET ORAL at 08:03

## 2021-03-19 RX ADMIN — SACUBITRIL AND VALSARTAN 1 TABLET: 49; 51 TABLET, FILM COATED ORAL at 08:03

## 2021-03-19 RX ADMIN — ACETAMINOPHEN 650 MG: 325 TABLET, FILM COATED ORAL at 08:03

## 2021-03-19 RX ADMIN — PANTOPRAZOLE SODIUM 40 MG: 40 TABLET, DELAYED RELEASE ORAL at 08:03

## 2021-03-19 RX ADMIN — ENOXAPARIN SODIUM 40 MG: 40 INJECTION SUBCUTANEOUS at 12:03

## 2021-03-19 RX ADMIN — ENOXAPARIN SODIUM 40 MG: 40 INJECTION SUBCUTANEOUS at 11:03

## 2021-03-19 RX ADMIN — BUMETANIDE 1 MG: 0.25 INJECTION INTRAMUSCULAR; INTRAVENOUS at 12:03

## 2021-03-19 RX ADMIN — LEVOTHYROXINE SODIUM 75 MCG: 0.03 TABLET ORAL at 06:03

## 2021-03-19 RX ADMIN — OXYCODONE HYDROCHLORIDE AND ACETAMINOPHEN 500 MG: 500 TABLET ORAL at 09:03

## 2021-03-19 RX ADMIN — THERA TABS 1 TABLET: TAB at 08:03

## 2021-03-20 VITALS
OXYGEN SATURATION: 97 % | WEIGHT: 184.94 LBS | SYSTOLIC BLOOD PRESSURE: 102 MMHG | TEMPERATURE: 97 F | RESPIRATION RATE: 20 BRPM | BODY MASS INDEX: 30.81 KG/M2 | DIASTOLIC BLOOD PRESSURE: 52 MMHG | HEIGHT: 65 IN | HEART RATE: 82 BPM

## 2021-03-20 PROBLEM — R79.89 ELEVATED TROPONIN: Status: RESOLVED | Noted: 2021-03-17 | Resolved: 2021-03-20

## 2021-03-20 PROBLEM — U07.1 PNEUMONIA DUE TO COVID-19 VIRUS: Status: RESOLVED | Noted: 2021-03-17 | Resolved: 2021-03-20

## 2021-03-20 PROBLEM — J12.82 PNEUMONIA DUE TO COVID-19 VIRUS: Status: RESOLVED | Noted: 2021-03-17 | Resolved: 2021-03-20

## 2021-03-20 LAB
ALBUMIN SERPL BCP-MCNC: 3.5 G/DL (ref 3.5–5.2)
ALBUMIN SERPL BCP-MCNC: 3.5 G/DL (ref 3.5–5.2)
ALP SERPL-CCNC: 56 U/L (ref 55–135)
ALP SERPL-CCNC: 56 U/L (ref 55–135)
ALT SERPL W/O P-5'-P-CCNC: 17 U/L (ref 10–44)
ALT SERPL W/O P-5'-P-CCNC: 17 U/L (ref 10–44)
ANION GAP SERPL CALC-SCNC: 12 MMOL/L (ref 8–16)
ANION GAP SERPL CALC-SCNC: 12 MMOL/L (ref 8–16)
AST SERPL-CCNC: 17 U/L (ref 10–40)
AST SERPL-CCNC: 17 U/L (ref 10–40)
BASOPHILS # BLD AUTO: 0 K/UL (ref 0–0.2)
BASOPHILS NFR BLD: 0 % (ref 0–1.9)
BILIRUB SERPL-MCNC: 0.8 MG/DL (ref 0.1–1)
BILIRUB SERPL-MCNC: 0.8 MG/DL (ref 0.1–1)
BUN SERPL-MCNC: 48 MG/DL (ref 8–23)
BUN SERPL-MCNC: 48 MG/DL (ref 8–23)
CALCIUM SERPL-MCNC: 9 MG/DL (ref 8.7–10.5)
CALCIUM SERPL-MCNC: 9 MG/DL (ref 8.7–10.5)
CHLORIDE SERPL-SCNC: 96 MMOL/L (ref 95–110)
CHLORIDE SERPL-SCNC: 96 MMOL/L (ref 95–110)
CO2 SERPL-SCNC: 33 MMOL/L (ref 23–29)
CO2 SERPL-SCNC: 33 MMOL/L (ref 23–29)
CREAT SERPL-MCNC: 1.2 MG/DL (ref 0.5–1.4)
CREAT SERPL-MCNC: 1.2 MG/DL (ref 0.5–1.4)
CRP SERPL-MCNC: 2.81 MG/DL
D DIMER PPP IA.FEU-MCNC: <0.27 UG/ML FEU
DIFFERENTIAL METHOD: ABNORMAL
EOSINOPHIL # BLD AUTO: 0 K/UL (ref 0–0.5)
EOSINOPHIL NFR BLD: 0 % (ref 0–8)
ERYTHROCYTE [DISTWIDTH] IN BLOOD BY AUTOMATED COUNT: 14.3 % (ref 11.5–14.5)
EST. GFR  (AFRICAN AMERICAN): 51.1 ML/MIN/1.73 M^2
EST. GFR  (AFRICAN AMERICAN): 51.1 ML/MIN/1.73 M^2
EST. GFR  (NON AFRICAN AMERICAN): 44.3 ML/MIN/1.73 M^2
EST. GFR  (NON AFRICAN AMERICAN): 44.3 ML/MIN/1.73 M^2
GLUCOSE SERPL-MCNC: 227 MG/DL (ref 70–110)
GLUCOSE SERPL-MCNC: 227 MG/DL (ref 70–110)
HCT VFR BLD AUTO: 33.9 % (ref 37–48.5)
HGB BLD-MCNC: 10.7 G/DL (ref 12–16)
IMM GRANULOCYTES # BLD AUTO: 0.01 K/UL (ref 0–0.04)
IMM GRANULOCYTES NFR BLD AUTO: 0.3 % (ref 0–0.5)
LYMPHOCYTES # BLD AUTO: 0.8 K/UL (ref 1–4.8)
LYMPHOCYTES NFR BLD: 27 % (ref 18–48)
MAGNESIUM SERPL-MCNC: 2 MG/DL (ref 1.6–2.6)
MCH RBC QN AUTO: 29.5 PG (ref 27–31)
MCHC RBC AUTO-ENTMCNC: 31.6 G/DL (ref 32–36)
MCV RBC AUTO: 93 FL (ref 82–98)
MONOCYTES # BLD AUTO: 0.3 K/UL (ref 0.3–1)
MONOCYTES NFR BLD: 10.2 % (ref 4–15)
NEUTROPHILS # BLD AUTO: 1.8 K/UL (ref 1.8–7.7)
NEUTROPHILS NFR BLD: 62.5 % (ref 38–73)
NRBC BLD-RTO: 0 /100 WBC
PLATELET # BLD AUTO: 174 K/UL (ref 150–350)
PMV BLD AUTO: 10.3 FL (ref 9.2–12.9)
POTASSIUM SERPL-SCNC: 4.1 MMOL/L (ref 3.5–5.1)
POTASSIUM SERPL-SCNC: 4.1 MMOL/L (ref 3.5–5.1)
PROT SERPL-MCNC: 6.3 G/DL (ref 6–8.4)
PROT SERPL-MCNC: 6.3 G/DL (ref 6–8.4)
RBC # BLD AUTO: 3.63 M/UL (ref 4–5.4)
SODIUM SERPL-SCNC: 141 MMOL/L (ref 136–145)
SODIUM SERPL-SCNC: 141 MMOL/L (ref 136–145)
WBC # BLD AUTO: 2.93 K/UL (ref 3.9–12.7)

## 2021-03-20 PROCEDURE — 25000242 PHARM REV CODE 250 ALT 637 W/ HCPCS: Performed by: INTERNAL MEDICINE

## 2021-03-20 PROCEDURE — 85025 COMPLETE CBC W/AUTO DIFF WBC: CPT | Performed by: INTERNAL MEDICINE

## 2021-03-20 PROCEDURE — 80053 COMPREHEN METABOLIC PANEL: CPT | Performed by: INTERNAL MEDICINE

## 2021-03-20 PROCEDURE — 27000221 HC OXYGEN, UP TO 24 HOURS

## 2021-03-20 PROCEDURE — 83735 ASSAY OF MAGNESIUM: CPT | Performed by: INTERNAL MEDICINE

## 2021-03-20 PROCEDURE — 25000003 PHARM REV CODE 250: Performed by: INTERNAL MEDICINE

## 2021-03-20 PROCEDURE — 63600175 PHARM REV CODE 636 W HCPCS: Performed by: INTERNAL MEDICINE

## 2021-03-20 PROCEDURE — 86140 C-REACTIVE PROTEIN: CPT | Performed by: INTERNAL MEDICINE

## 2021-03-20 PROCEDURE — 36415 COLL VENOUS BLD VENIPUNCTURE: CPT | Performed by: INTERNAL MEDICINE

## 2021-03-20 PROCEDURE — 94761 N-INVAS EAR/PLS OXIMETRY MLT: CPT

## 2021-03-20 PROCEDURE — 94660 CPAP INITIATION&MGMT: CPT

## 2021-03-20 PROCEDURE — 99900035 HC TECH TIME PER 15 MIN (STAT)

## 2021-03-20 PROCEDURE — 85379 FIBRIN DEGRADATION QUANT: CPT | Performed by: INTERNAL MEDICINE

## 2021-03-20 PROCEDURE — 99900031 HC PATIENT EDUCATION (STAT)

## 2021-03-20 PROCEDURE — S0171 BUMETANIDE 0.5 MG: HCPCS | Performed by: INTERNAL MEDICINE

## 2021-03-20 RX ORDER — DEXAMETHASONE 6 MG/1
6 TABLET ORAL DAILY
Qty: 7 TABLET | Refills: 0 | Status: SHIPPED | OUTPATIENT
Start: 2021-03-21 | End: 2021-03-28

## 2021-03-20 RX ORDER — GUAIFENESIN/DEXTROMETHORPHAN 100-10MG/5
5 SYRUP ORAL EVERY 6 HOURS PRN
Qty: 118 ML | Refills: 0 | Status: ON HOLD | OUTPATIENT
Start: 2021-03-20 | End: 2021-04-05 | Stop reason: HOSPADM

## 2021-03-20 RX ADMIN — LEVOTHYROXINE SODIUM 75 MCG: 0.03 TABLET ORAL at 05:03

## 2021-03-20 RX ADMIN — BUMETANIDE 1 MG: 0.25 INJECTION INTRAMUSCULAR; INTRAVENOUS at 08:03

## 2021-03-20 RX ADMIN — PROPRANOLOL HYDROCHLORIDE 160 MG: 80 CAPSULE, EXTENDED RELEASE ORAL at 08:03

## 2021-03-20 RX ADMIN — REMDESIVIR 100 MG: 100 INJECTION, POWDER, LYOPHILIZED, FOR SOLUTION INTRAVENOUS at 01:03

## 2021-03-20 RX ADMIN — OXYCODONE HYDROCHLORIDE AND ACETAMINOPHEN 500 MG: 500 TABLET ORAL at 08:03

## 2021-03-20 RX ADMIN — ENOXAPARIN SODIUM 40 MG: 40 INJECTION SUBCUTANEOUS at 11:03

## 2021-03-20 RX ADMIN — DEXAMETHASONE 6 MG: 4 TABLET ORAL at 08:03

## 2021-03-20 RX ADMIN — PANTOPRAZOLE SODIUM 40 MG: 40 TABLET, DELAYED RELEASE ORAL at 08:03

## 2021-03-20 RX ADMIN — SACUBITRIL AND VALSARTAN 1 TABLET: 49; 51 TABLET, FILM COATED ORAL at 08:03

## 2021-03-20 RX ADMIN — THERA TABS 1 TABLET: TAB at 08:03

## 2021-03-22 ENCOUNTER — PATIENT OUTREACH (OUTPATIENT)
Dept: INFECTIOUS DISEASES | Facility: HOSPITAL | Age: 76
End: 2021-03-22

## 2021-03-22 ENCOUNTER — PATIENT OUTREACH (OUTPATIENT)
Dept: ADMINISTRATIVE | Facility: CLINIC | Age: 76
End: 2021-03-22

## 2021-03-22 ENCOUNTER — PATIENT MESSAGE (OUTPATIENT)
Dept: FAMILY MEDICINE | Facility: CLINIC | Age: 76
End: 2021-03-22

## 2021-03-22 ENCOUNTER — TELEPHONE (OUTPATIENT)
Dept: FAMILY MEDICINE | Facility: CLINIC | Age: 76
End: 2021-03-22

## 2021-03-26 ENCOUNTER — PATIENT OUTREACH (OUTPATIENT)
Dept: INFECTIOUS DISEASES | Facility: HOSPITAL | Age: 76
End: 2021-03-26

## 2021-03-29 ENCOUNTER — PATIENT MESSAGE (OUTPATIENT)
Dept: FAMILY MEDICINE | Facility: CLINIC | Age: 76
End: 2021-03-29

## 2021-03-29 DIAGNOSIS — U07.1 LOWER RESPIRATORY TRACT INFECTION DUE TO COVID-19 VIRUS: ICD-10-CM

## 2021-03-29 DIAGNOSIS — J96.11 CHRONIC RESPIRATORY FAILURE WITH HYPOXIA: Primary | ICD-10-CM

## 2021-03-29 DIAGNOSIS — J22 LOWER RESPIRATORY TRACT INFECTION DUE TO COVID-19 VIRUS: ICD-10-CM

## 2021-03-31 ENCOUNTER — TELEPHONE (OUTPATIENT)
Dept: FAMILY MEDICINE | Facility: CLINIC | Age: 76
End: 2021-03-31

## 2021-03-31 ENCOUNTER — HOSPITAL ENCOUNTER (OUTPATIENT)
Facility: HOSPITAL | Age: 76
Discharge: HOME HEALTH | End: 2021-04-05
Attending: EMERGENCY MEDICINE | Admitting: INTERNAL MEDICINE
Payer: MEDICARE

## 2021-03-31 DIAGNOSIS — R41.82 AMS (ALTERED MENTAL STATUS): ICD-10-CM

## 2021-03-31 DIAGNOSIS — Z20.822 SUSPECTED COVID-19 VIRUS INFECTION: ICD-10-CM

## 2021-03-31 DIAGNOSIS — R07.9 CHEST PAIN: ICD-10-CM

## 2021-03-31 DIAGNOSIS — I50.33 ACUTE ON CHRONIC DIASTOLIC HEART FAILURE: ICD-10-CM

## 2021-03-31 DIAGNOSIS — R53.1 WEAKNESS: Primary | ICD-10-CM

## 2021-03-31 LAB
ALBUMIN SERPL BCP-MCNC: 3.3 G/DL (ref 3.5–5.2)
ALP SERPL-CCNC: 76 U/L (ref 55–135)
ALT SERPL W/O P-5'-P-CCNC: 80 U/L (ref 10–44)
ANION GAP SERPL CALC-SCNC: 9 MMOL/L (ref 8–16)
AST SERPL-CCNC: 58 U/L (ref 10–40)
B-OH-BUTYR BLD STRIP-SCNC: 0.6 MMOL/L (ref 0–0.5)
BASOPHILS # BLD AUTO: 0 K/UL (ref 0–0.2)
BASOPHILS NFR BLD: 0 % (ref 0–1.9)
BILIRUB SERPL-MCNC: 0.8 MG/DL (ref 0.1–1)
BUN SERPL-MCNC: 64 MG/DL (ref 8–23)
CALCIUM SERPL-MCNC: 10.1 MG/DL (ref 8.7–10.5)
CHLORIDE SERPL-SCNC: 92 MMOL/L (ref 95–110)
CK SERPL-CCNC: 174 U/L (ref 20–180)
CO2 SERPL-SCNC: 38 MMOL/L (ref 23–29)
CREAT SERPL-MCNC: 1.6 MG/DL (ref 0.5–1.4)
CRP SERPL-MCNC: 36.3 MG/L (ref 0–8.2)
D DIMER PPP IA.FEU-MCNC: 0.23 MG/L FEU
DIFFERENTIAL METHOD: ABNORMAL
EOSINOPHIL # BLD AUTO: 0.2 K/UL (ref 0–0.5)
EOSINOPHIL NFR BLD: 1.6 % (ref 0–8)
ERYTHROCYTE [DISTWIDTH] IN BLOOD BY AUTOMATED COUNT: 15.4 % (ref 11.5–14.5)
EST. GFR  (AFRICAN AMERICAN): 36 ML/MIN/1.73 M^2
EST. GFR  (NON AFRICAN AMERICAN): 31 ML/MIN/1.73 M^2
FERRITIN SERPL-MCNC: 278 NG/ML (ref 20–300)
GLUCOSE SERPL-MCNC: 436 MG/DL (ref 70–110)
HCT VFR BLD AUTO: 39.7 % (ref 37–48.5)
HGB BLD-MCNC: 12.3 G/DL (ref 12–16)
IMM GRANULOCYTES # BLD AUTO: 0.06 K/UL (ref 0–0.04)
IMM GRANULOCYTES NFR BLD AUTO: 0.6 % (ref 0–0.5)
LACTATE SERPL-SCNC: 1.1 MMOL/L (ref 0.5–2.2)
LDH SERPL L TO P-CCNC: 339 U/L (ref 110–260)
LYMPHOCYTES # BLD AUTO: 0.8 K/UL (ref 1–4.8)
LYMPHOCYTES NFR BLD: 7.9 % (ref 18–48)
MCH RBC QN AUTO: 29.2 PG (ref 27–31)
MCHC RBC AUTO-ENTMCNC: 31 G/DL (ref 32–36)
MCV RBC AUTO: 94 FL (ref 82–98)
MONOCYTES # BLD AUTO: 0.7 K/UL (ref 0.3–1)
MONOCYTES NFR BLD: 6.5 % (ref 4–15)
NEUTROPHILS # BLD AUTO: 8.5 K/UL (ref 1.8–7.7)
NEUTROPHILS NFR BLD: 83.4 % (ref 38–73)
NRBC BLD-RTO: 0 /100 WBC
PLATELET # BLD AUTO: 226 K/UL (ref 150–450)
PMV BLD AUTO: 10.6 FL (ref 9.2–12.9)
POCT GLUCOSE: 317 MG/DL (ref 70–110)
POCT GLUCOSE: 364 MG/DL (ref 70–110)
POTASSIUM SERPL-SCNC: 5.1 MMOL/L (ref 3.5–5.1)
PROCALCITONIN SERPL IA-MCNC: 0.47 NG/ML
PROT SERPL-MCNC: 6.4 G/DL (ref 6–8.4)
RBC # BLD AUTO: 4.21 M/UL (ref 4–5.4)
SODIUM SERPL-SCNC: 139 MMOL/L (ref 136–145)
TROPONIN I SERPL DL<=0.01 NG/ML-MCNC: 0.06 NG/ML (ref 0–0.03)
TROPONIN I SERPL DL<=0.01 NG/ML-MCNC: 0.06 NG/ML (ref 0–0.03)
WBC # BLD AUTO: 10.19 K/UL (ref 3.9–12.7)

## 2021-03-31 PROCEDURE — 63600175 PHARM REV CODE 636 W HCPCS: Performed by: EMERGENCY MEDICINE

## 2021-03-31 PROCEDURE — 27000221 HC OXYGEN, UP TO 24 HOURS

## 2021-03-31 PROCEDURE — 80053 COMPREHEN METABOLIC PANEL: CPT | Performed by: EMERGENCY MEDICINE

## 2021-03-31 PROCEDURE — 85379 FIBRIN DEGRADATION QUANT: CPT | Performed by: EMERGENCY MEDICINE

## 2021-03-31 PROCEDURE — 82010 KETONE BODYS QUAN: CPT | Performed by: EMERGENCY MEDICINE

## 2021-03-31 PROCEDURE — 85025 COMPLETE CBC W/AUTO DIFF WBC: CPT | Performed by: EMERGENCY MEDICINE

## 2021-03-31 PROCEDURE — 96361 HYDRATE IV INFUSION ADD-ON: CPT

## 2021-03-31 PROCEDURE — 82550 ASSAY OF CK (CPK): CPT | Performed by: EMERGENCY MEDICINE

## 2021-03-31 PROCEDURE — G0378 HOSPITAL OBSERVATION PER HR: HCPCS

## 2021-03-31 PROCEDURE — 63600175 PHARM REV CODE 636 W HCPCS: Performed by: NURSE PRACTITIONER

## 2021-03-31 PROCEDURE — 93010 ELECTROCARDIOGRAM REPORT: CPT | Mod: ,,, | Performed by: INTERNAL MEDICINE

## 2021-03-31 PROCEDURE — 93010 EKG 12-LEAD: ICD-10-PCS | Mod: ,,, | Performed by: INTERNAL MEDICINE

## 2021-03-31 PROCEDURE — 87077 CULTURE AEROBIC IDENTIFY: CPT | Performed by: NURSE PRACTITIONER

## 2021-03-31 PROCEDURE — 36415 COLL VENOUS BLD VENIPUNCTURE: CPT | Performed by: EMERGENCY MEDICINE

## 2021-03-31 PROCEDURE — 25000242 PHARM REV CODE 250 ALT 637 W/ HCPCS: Performed by: NURSE PRACTITIONER

## 2021-03-31 PROCEDURE — 84484 ASSAY OF TROPONIN QUANT: CPT | Mod: 91 | Performed by: NURSE PRACTITIONER

## 2021-03-31 PROCEDURE — 84484 ASSAY OF TROPONIN QUANT: CPT | Performed by: EMERGENCY MEDICINE

## 2021-03-31 PROCEDURE — 96372 THER/PROPH/DIAG INJ SC/IM: CPT | Mod: 59 | Performed by: EMERGENCY MEDICINE

## 2021-03-31 PROCEDURE — 82962 GLUCOSE BLOOD TEST: CPT

## 2021-03-31 PROCEDURE — 82728 ASSAY OF FERRITIN: CPT | Performed by: EMERGENCY MEDICINE

## 2021-03-31 PROCEDURE — 96374 THER/PROPH/DIAG INJ IV PUSH: CPT

## 2021-03-31 PROCEDURE — 86140 C-REACTIVE PROTEIN: CPT | Performed by: EMERGENCY MEDICINE

## 2021-03-31 PROCEDURE — 87070 CULTURE OTHR SPECIMN AEROBIC: CPT | Performed by: NURSE PRACTITIONER

## 2021-03-31 PROCEDURE — 93005 ELECTROCARDIOGRAM TRACING: CPT

## 2021-03-31 PROCEDURE — 36415 COLL VENOUS BLD VENIPUNCTURE: CPT | Performed by: NURSE PRACTITIONER

## 2021-03-31 PROCEDURE — 83615 LACTATE (LD) (LDH) ENZYME: CPT | Performed by: EMERGENCY MEDICINE

## 2021-03-31 PROCEDURE — 25000003 PHARM REV CODE 250: Performed by: NURSE PRACTITIONER

## 2021-03-31 PROCEDURE — 99285 EMERGENCY DEPT VISIT HI MDM: CPT | Mod: 25

## 2021-03-31 PROCEDURE — 87186 SC STD MICRODIL/AGAR DIL: CPT | Performed by: NURSE PRACTITIONER

## 2021-03-31 PROCEDURE — 99900035 HC TECH TIME PER 15 MIN (STAT)

## 2021-03-31 PROCEDURE — 25000003 PHARM REV CODE 250: Performed by: EMERGENCY MEDICINE

## 2021-03-31 PROCEDURE — 83605 ASSAY OF LACTIC ACID: CPT | Performed by: EMERGENCY MEDICINE

## 2021-03-31 PROCEDURE — 84145 PROCALCITONIN (PCT): CPT | Performed by: EMERGENCY MEDICINE

## 2021-03-31 PROCEDURE — 87205 SMEAR GRAM STAIN: CPT | Performed by: NURSE PRACTITIONER

## 2021-03-31 RX ORDER — IPRATROPIUM BROMIDE AND ALBUTEROL SULFATE 2.5; .5 MG/3ML; MG/3ML
3 SOLUTION RESPIRATORY (INHALATION) EVERY 4 HOURS PRN
Status: DISCONTINUED | OUTPATIENT
Start: 2021-03-31 | End: 2021-04-05 | Stop reason: HOSPADM

## 2021-03-31 RX ORDER — AZITHROMYCIN 250 MG/1
500 TABLET, FILM COATED ORAL DAILY
Status: DISCONTINUED | OUTPATIENT
Start: 2021-04-01 | End: 2021-04-03

## 2021-03-31 RX ORDER — BUMETANIDE 0.5 MG/1
0.5 TABLET ORAL DAILY
Status: DISCONTINUED | OUTPATIENT
Start: 2021-04-01 | End: 2021-04-05 | Stop reason: HOSPADM

## 2021-03-31 RX ORDER — SODIUM,POTASSIUM PHOSPHATES 280-250MG
2 POWDER IN PACKET (EA) ORAL
Status: DISCONTINUED | OUTPATIENT
Start: 2021-03-31 | End: 2021-04-05 | Stop reason: HOSPADM

## 2021-03-31 RX ORDER — FLUTICASONE PROPIONATE 50 MCG
2 SPRAY, SUSPENSION (ML) NASAL NIGHTLY
Status: DISCONTINUED | OUTPATIENT
Start: 2021-03-31 | End: 2021-04-05 | Stop reason: HOSPADM

## 2021-03-31 RX ORDER — ASCORBIC ACID 500 MG
1000 TABLET ORAL DAILY
Status: DISCONTINUED | OUTPATIENT
Start: 2021-04-01 | End: 2021-04-05 | Stop reason: HOSPADM

## 2021-03-31 RX ORDER — LANOLIN ALCOHOL/MO/W.PET/CERES
800 CREAM (GRAM) TOPICAL
Status: DISCONTINUED | OUTPATIENT
Start: 2021-03-31 | End: 2021-04-05 | Stop reason: HOSPADM

## 2021-03-31 RX ORDER — IBUPROFEN 200 MG
24 TABLET ORAL
Status: DISCONTINUED | OUTPATIENT
Start: 2021-03-31 | End: 2021-04-05 | Stop reason: HOSPADM

## 2021-03-31 RX ORDER — GUAIFENESIN/DEXTROMETHORPHAN 100-10MG/5
5 SYRUP ORAL EVERY 6 HOURS PRN
Status: DISCONTINUED | OUTPATIENT
Start: 2021-03-31 | End: 2021-04-05 | Stop reason: HOSPADM

## 2021-03-31 RX ORDER — SODIUM CHLORIDE 0.9 % (FLUSH) 0.9 %
10 SYRINGE (ML) INJECTION
Status: DISCONTINUED | OUTPATIENT
Start: 2021-03-31 | End: 2021-04-05 | Stop reason: HOSPADM

## 2021-03-31 RX ORDER — INSULIN ASPART 100 [IU]/ML
0-5 INJECTION, SOLUTION INTRAVENOUS; SUBCUTANEOUS
Status: DISCONTINUED | OUTPATIENT
Start: 2021-03-31 | End: 2021-04-05 | Stop reason: HOSPADM

## 2021-03-31 RX ORDER — PROPRANOLOL HYDROCHLORIDE 80 MG/1
160 CAPSULE, EXTENDED RELEASE ORAL DAILY
Status: DISCONTINUED | OUTPATIENT
Start: 2021-04-01 | End: 2021-04-05 | Stop reason: HOSPADM

## 2021-03-31 RX ORDER — GLUCAGON 1 MG
1 KIT INJECTION
Status: DISCONTINUED | OUTPATIENT
Start: 2021-03-31 | End: 2021-04-05 | Stop reason: HOSPADM

## 2021-03-31 RX ORDER — ONDANSETRON 2 MG/ML
8 INJECTION INTRAMUSCULAR; INTRAVENOUS EVERY 8 HOURS PRN
Status: DISCONTINUED | OUTPATIENT
Start: 2021-03-31 | End: 2021-04-05 | Stop reason: HOSPADM

## 2021-03-31 RX ORDER — TALC
6 POWDER (GRAM) TOPICAL NIGHTLY PRN
Status: DISCONTINUED | OUTPATIENT
Start: 2021-03-31 | End: 2021-04-05 | Stop reason: HOSPADM

## 2021-03-31 RX ORDER — IBUPROFEN 200 MG
16 TABLET ORAL
Status: DISCONTINUED | OUTPATIENT
Start: 2021-03-31 | End: 2021-04-05 | Stop reason: HOSPADM

## 2021-03-31 RX ORDER — PANTOPRAZOLE SODIUM 40 MG/1
40 TABLET, DELAYED RELEASE ORAL DAILY
Status: DISCONTINUED | OUTPATIENT
Start: 2021-04-01 | End: 2021-04-05 | Stop reason: HOSPADM

## 2021-03-31 RX ORDER — SPIRONOLACTONE 25 MG/1
25 TABLET ORAL DAILY
Status: DISCONTINUED | OUTPATIENT
Start: 2021-04-01 | End: 2021-04-05 | Stop reason: HOSPADM

## 2021-03-31 RX ADMIN — SACUBITRIL AND VALSARTAN 2 TABLET: 49; 51 TABLET, FILM COATED ORAL at 08:03

## 2021-03-31 RX ADMIN — INSULIN HUMAN 4 UNITS: 100 INJECTION, SOLUTION PARENTERAL at 03:03

## 2021-03-31 RX ADMIN — SODIUM CHLORIDE 500 ML: 0.9 INJECTION, SOLUTION INTRAVENOUS at 03:03

## 2021-03-31 RX ADMIN — APIXABAN 5 MG: 2.5 TABLET, FILM COATED ORAL at 08:03

## 2021-03-31 RX ADMIN — INSULIN ASPART 2 UNITS: 100 INJECTION, SOLUTION INTRAVENOUS; SUBCUTANEOUS at 09:03

## 2021-03-31 RX ADMIN — FLUTICASONE PROPIONATE 100 MCG: 50 SPRAY, METERED NASAL at 10:03

## 2021-04-01 LAB
ANION GAP SERPL CALC-SCNC: 10 MMOL/L (ref 8–16)
BASOPHILS # BLD AUTO: 0.01 K/UL (ref 0–0.2)
BASOPHILS NFR BLD: 0.1 % (ref 0–1.9)
BUN SERPL-MCNC: 46 MG/DL (ref 8–23)
CALCIUM SERPL-MCNC: 9.7 MG/DL (ref 8.7–10.5)
CHLORIDE SERPL-SCNC: 97 MMOL/L (ref 95–110)
CO2 SERPL-SCNC: 35 MMOL/L (ref 23–29)
CREAT SERPL-MCNC: 1.2 MG/DL (ref 0.5–1.4)
DIFFERENTIAL METHOD: ABNORMAL
EOSINOPHIL # BLD AUTO: 0.2 K/UL (ref 0–0.5)
EOSINOPHIL NFR BLD: 2.7 % (ref 0–8)
ERYTHROCYTE [DISTWIDTH] IN BLOOD BY AUTOMATED COUNT: 15.3 % (ref 11.5–14.5)
EST. GFR  (AFRICAN AMERICAN): 51 ML/MIN/1.73 M^2
EST. GFR  (NON AFRICAN AMERICAN): 44 ML/MIN/1.73 M^2
GLUCOSE SERPL-MCNC: 263 MG/DL (ref 70–110)
HCT VFR BLD AUTO: 39.2 % (ref 37–48.5)
HGB BLD-MCNC: 12 G/DL (ref 12–16)
IMM GRANULOCYTES # BLD AUTO: 0.03 K/UL (ref 0–0.04)
IMM GRANULOCYTES NFR BLD AUTO: 0.4 % (ref 0–0.5)
LYMPHOCYTES # BLD AUTO: 1.3 K/UL (ref 1–4.8)
LYMPHOCYTES NFR BLD: 15.7 % (ref 18–48)
MCH RBC QN AUTO: 29.4 PG (ref 27–31)
MCHC RBC AUTO-ENTMCNC: 30.6 G/DL (ref 32–36)
MCV RBC AUTO: 96 FL (ref 82–98)
MONOCYTES # BLD AUTO: 0.6 K/UL (ref 0.3–1)
MONOCYTES NFR BLD: 7.8 % (ref 4–15)
NEUTROPHILS # BLD AUTO: 5.9 K/UL (ref 1.8–7.7)
NEUTROPHILS NFR BLD: 73.3 % (ref 38–73)
NRBC BLD-RTO: 0 /100 WBC
PLATELET # BLD AUTO: 197 K/UL (ref 150–450)
PMV BLD AUTO: 10.6 FL (ref 9.2–12.9)
POCT GLUCOSE: 246 MG/DL (ref 70–110)
POCT GLUCOSE: 304 MG/DL (ref 70–110)
POCT GLUCOSE: 318 MG/DL (ref 70–110)
POTASSIUM SERPL-SCNC: 5.1 MMOL/L (ref 3.5–5.1)
RBC # BLD AUTO: 4.08 M/UL (ref 4–5.4)
SODIUM SERPL-SCNC: 142 MMOL/L (ref 136–145)
WBC # BLD AUTO: 8.05 K/UL (ref 3.9–12.7)

## 2021-04-01 PROCEDURE — G0378 HOSPITAL OBSERVATION PER HR: HCPCS

## 2021-04-01 PROCEDURE — 30200315 PPD INTRADERMAL TEST REV CODE 302: Performed by: NURSE PRACTITIONER

## 2021-04-01 PROCEDURE — 97165 OT EVAL LOW COMPLEX 30 MIN: CPT

## 2021-04-01 PROCEDURE — 85025 COMPLETE CBC W/AUTO DIFF WBC: CPT | Performed by: NURSE PRACTITIONER

## 2021-04-01 PROCEDURE — 94761 N-INVAS EAR/PLS OXIMETRY MLT: CPT

## 2021-04-01 PROCEDURE — 86580 TB INTRADERMAL TEST: CPT | Performed by: NURSE PRACTITIONER

## 2021-04-01 PROCEDURE — 97535 SELF CARE MNGMENT TRAINING: CPT

## 2021-04-01 PROCEDURE — 25000003 PHARM REV CODE 250: Performed by: NURSE PRACTITIONER

## 2021-04-01 PROCEDURE — 27000221 HC OXYGEN, UP TO 24 HOURS

## 2021-04-01 PROCEDURE — 99900035 HC TECH TIME PER 15 MIN (STAT)

## 2021-04-01 PROCEDURE — 97110 THERAPEUTIC EXERCISES: CPT

## 2021-04-01 PROCEDURE — 97116 GAIT TRAINING THERAPY: CPT

## 2021-04-01 PROCEDURE — 63700000 PHARM REV CODE 250 ALT 637 W/O HCPCS: Performed by: NURSE PRACTITIONER

## 2021-04-01 PROCEDURE — 96372 THER/PROPH/DIAG INJ SC/IM: CPT | Mod: 59 | Performed by: EMERGENCY MEDICINE

## 2021-04-01 PROCEDURE — 36415 COLL VENOUS BLD VENIPUNCTURE: CPT | Performed by: NURSE PRACTITIONER

## 2021-04-01 PROCEDURE — 97162 PT EVAL MOD COMPLEX 30 MIN: CPT

## 2021-04-01 PROCEDURE — 80048 BASIC METABOLIC PNL TOTAL CA: CPT | Performed by: NURSE PRACTITIONER

## 2021-04-01 RX ADMIN — BUMETANIDE 0.5 MG: 0.5 TABLET ORAL at 09:04

## 2021-04-01 RX ADMIN — AZITHROMYCIN MONOHYDRATE 500 MG: 250 TABLET ORAL at 09:04

## 2021-04-01 RX ADMIN — SPIRONOLACTONE 25 MG: 25 TABLET ORAL at 09:04

## 2021-04-01 RX ADMIN — APIXABAN 5 MG: 2.5 TABLET, FILM COATED ORAL at 09:04

## 2021-04-01 RX ADMIN — INSULIN ASPART 4 UNITS: 100 INJECTION, SOLUTION INTRAVENOUS; SUBCUTANEOUS at 11:04

## 2021-04-01 RX ADMIN — FLUTICASONE PROPIONATE 100 MCG: 50 SPRAY, METERED NASAL at 09:04

## 2021-04-01 RX ADMIN — LEVOTHYROXINE SODIUM 75 MCG: 0.03 TABLET ORAL at 05:04

## 2021-04-01 RX ADMIN — SACUBITRIL AND VALSARTAN 2 TABLET: 49; 51 TABLET, FILM COATED ORAL at 09:04

## 2021-04-01 RX ADMIN — PROPRANOLOL HYDROCHLORIDE 160 MG: 80 CAPSULE, EXTENDED RELEASE ORAL at 09:04

## 2021-04-01 RX ADMIN — MAGNESIUM OXIDE TAB 400 MG (241.3 MG ELEMENTAL MG) 200 MG: 400 (241.3 MG) TAB at 09:04

## 2021-04-01 RX ADMIN — TUBERCULIN PURIFIED PROTEIN DERIVATIVE 5 UNITS: 5 INJECTION, SOLUTION INTRADERMAL at 09:04

## 2021-04-01 RX ADMIN — PANTOPRAZOLE SODIUM 40 MG: 40 TABLET, DELAYED RELEASE ORAL at 09:04

## 2021-04-01 RX ADMIN — INSULIN ASPART 4 UNITS: 100 INJECTION, SOLUTION INTRAVENOUS; SUBCUTANEOUS at 04:04

## 2021-04-01 RX ADMIN — INSULIN ASPART 2 UNITS: 100 INJECTION, SOLUTION INTRAVENOUS; SUBCUTANEOUS at 05:04

## 2021-04-01 RX ADMIN — OXYCODONE HYDROCHLORIDE AND ACETAMINOPHEN 1000 MG: 500 TABLET ORAL at 09:04

## 2021-04-02 LAB
ANION GAP SERPL CALC-SCNC: 8 MMOL/L (ref 8–16)
BASOPHILS # BLD AUTO: 0 K/UL (ref 0–0.2)
BASOPHILS NFR BLD: 0 % (ref 0–1.9)
BUN SERPL-MCNC: 32 MG/DL (ref 8–23)
CALCIUM SERPL-MCNC: 8.9 MG/DL (ref 8.7–10.5)
CHLORIDE SERPL-SCNC: 94 MMOL/L (ref 95–110)
CO2 SERPL-SCNC: 36 MMOL/L (ref 23–29)
CREAT SERPL-MCNC: 1 MG/DL (ref 0.5–1.4)
DIFFERENTIAL METHOD: ABNORMAL
EOSINOPHIL # BLD AUTO: 0.1 K/UL (ref 0–0.5)
EOSINOPHIL NFR BLD: 1.9 % (ref 0–8)
ERYTHROCYTE [DISTWIDTH] IN BLOOD BY AUTOMATED COUNT: 14.9 % (ref 11.5–14.5)
EST. GFR  (AFRICAN AMERICAN): >60 ML/MIN/1.73 M^2
EST. GFR  (NON AFRICAN AMERICAN): 55 ML/MIN/1.73 M^2
GLUCOSE SERPL-MCNC: 230 MG/DL (ref 70–110)
HCT VFR BLD AUTO: 37.8 % (ref 37–48.5)
HGB BLD-MCNC: 11.6 G/DL (ref 12–16)
IMM GRANULOCYTES # BLD AUTO: 0.03 K/UL (ref 0–0.04)
IMM GRANULOCYTES NFR BLD AUTO: 0.4 % (ref 0–0.5)
LYMPHOCYTES # BLD AUTO: 1.1 K/UL (ref 1–4.8)
LYMPHOCYTES NFR BLD: 16.1 % (ref 18–48)
MCH RBC QN AUTO: 28.8 PG (ref 27–31)
MCHC RBC AUTO-ENTMCNC: 30.7 G/DL (ref 32–36)
MCV RBC AUTO: 94 FL (ref 82–98)
MONOCYTES # BLD AUTO: 0.5 K/UL (ref 0.3–1)
MONOCYTES NFR BLD: 7.4 % (ref 4–15)
NEUTROPHILS # BLD AUTO: 5.1 K/UL (ref 1.8–7.7)
NEUTROPHILS NFR BLD: 74.2 % (ref 38–73)
NRBC BLD-RTO: 0 /100 WBC
PLATELET # BLD AUTO: 206 K/UL (ref 150–450)
PMV BLD AUTO: 10.5 FL (ref 9.2–12.9)
POCT GLUCOSE: 232 MG/DL (ref 70–110)
POCT GLUCOSE: 249 MG/DL (ref 70–110)
POCT GLUCOSE: 305 MG/DL (ref 70–110)
POCT GLUCOSE: 360 MG/DL (ref 70–110)
POTASSIUM SERPL-SCNC: 4.8 MMOL/L (ref 3.5–5.1)
RBC # BLD AUTO: 4.03 M/UL (ref 4–5.4)
SODIUM SERPL-SCNC: 138 MMOL/L (ref 136–145)
WBC # BLD AUTO: 6.85 K/UL (ref 3.9–12.7)

## 2021-04-02 PROCEDURE — 80048 BASIC METABOLIC PNL TOTAL CA: CPT | Performed by: NURSE PRACTITIONER

## 2021-04-02 PROCEDURE — 27000221 HC OXYGEN, UP TO 24 HOURS

## 2021-04-02 PROCEDURE — G0378 HOSPITAL OBSERVATION PER HR: HCPCS

## 2021-04-02 PROCEDURE — 94761 N-INVAS EAR/PLS OXIMETRY MLT: CPT

## 2021-04-02 PROCEDURE — 97116 GAIT TRAINING THERAPY: CPT

## 2021-04-02 PROCEDURE — 99900035 HC TECH TIME PER 15 MIN (STAT)

## 2021-04-02 PROCEDURE — 25000003 PHARM REV CODE 250: Performed by: NURSE PRACTITIONER

## 2021-04-02 PROCEDURE — 85025 COMPLETE CBC W/AUTO DIFF WBC: CPT | Performed by: NURSE PRACTITIONER

## 2021-04-02 PROCEDURE — 97535 SELF CARE MNGMENT TRAINING: CPT

## 2021-04-02 PROCEDURE — 96372 THER/PROPH/DIAG INJ SC/IM: CPT | Mod: 59 | Performed by: EMERGENCY MEDICINE

## 2021-04-02 PROCEDURE — 36415 COLL VENOUS BLD VENIPUNCTURE: CPT | Performed by: NURSE PRACTITIONER

## 2021-04-02 PROCEDURE — 94640 AIRWAY INHALATION TREATMENT: CPT

## 2021-04-02 PROCEDURE — 97110 THERAPEUTIC EXERCISES: CPT

## 2021-04-02 PROCEDURE — 25000242 PHARM REV CODE 250 ALT 637 W/ HCPCS: Performed by: NURSE PRACTITIONER

## 2021-04-02 PROCEDURE — 63700000 PHARM REV CODE 250 ALT 637 W/O HCPCS: Performed by: NURSE PRACTITIONER

## 2021-04-02 RX ADMIN — OXYCODONE HYDROCHLORIDE AND ACETAMINOPHEN 1000 MG: 500 TABLET ORAL at 10:04

## 2021-04-02 RX ADMIN — PROPRANOLOL HYDROCHLORIDE 160 MG: 80 CAPSULE, EXTENDED RELEASE ORAL at 10:04

## 2021-04-02 RX ADMIN — SPIRONOLACTONE 25 MG: 25 TABLET ORAL at 10:04

## 2021-04-02 RX ADMIN — INSULIN ASPART 3 UNITS: 100 INJECTION, SOLUTION INTRAVENOUS; SUBCUTANEOUS at 12:04

## 2021-04-02 RX ADMIN — MAGNESIUM OXIDE TAB 400 MG (241.3 MG ELEMENTAL MG) 200 MG: 400 (241.3 MG) TAB at 10:04

## 2021-04-02 RX ADMIN — APIXABAN 5 MG: 2.5 TABLET, FILM COATED ORAL at 10:04

## 2021-04-02 RX ADMIN — BUMETANIDE 0.5 MG: 0.5 TABLET ORAL at 10:04

## 2021-04-02 RX ADMIN — IPRATROPIUM BROMIDE AND ALBUTEROL SULFATE 3 ML: .5; 3 SOLUTION RESPIRATORY (INHALATION) at 11:04

## 2021-04-02 RX ADMIN — INSULIN ASPART 2 UNITS: 100 INJECTION, SOLUTION INTRAVENOUS; SUBCUTANEOUS at 05:04

## 2021-04-02 RX ADMIN — LEVOTHYROXINE SODIUM 75 MCG: 0.03 TABLET ORAL at 05:04

## 2021-04-02 RX ADMIN — FLUTICASONE PROPIONATE 100 MCG: 50 SPRAY, METERED NASAL at 09:04

## 2021-04-02 RX ADMIN — INSULIN ASPART 4 UNITS: 100 INJECTION, SOLUTION INTRAVENOUS; SUBCUTANEOUS at 05:04

## 2021-04-02 RX ADMIN — APIXABAN 5 MG: 2.5 TABLET, FILM COATED ORAL at 09:04

## 2021-04-02 RX ADMIN — PANTOPRAZOLE SODIUM 40 MG: 40 TABLET, DELAYED RELEASE ORAL at 10:04

## 2021-04-02 RX ADMIN — SACUBITRIL AND VALSARTAN 2 TABLET: 49; 51 TABLET, FILM COATED ORAL at 09:04

## 2021-04-02 RX ADMIN — AZITHROMYCIN MONOHYDRATE 500 MG: 250 TABLET ORAL at 10:04

## 2021-04-02 RX ADMIN — INSULIN ASPART 2 UNITS: 100 INJECTION, SOLUTION INTRAVENOUS; SUBCUTANEOUS at 11:04

## 2021-04-02 RX ADMIN — SACUBITRIL AND VALSARTAN 2 TABLET: 49; 51 TABLET, FILM COATED ORAL at 10:04

## 2021-04-03 PROBLEM — J18.9 PNEUMONIA: Status: ACTIVE | Noted: 2021-04-03

## 2021-04-03 LAB
ANION GAP SERPL CALC-SCNC: 6 MMOL/L (ref 8–16)
BACTERIA #/AREA URNS HPF: ABNORMAL /HPF
BACTERIA SPEC AEROBE CULT: ABNORMAL
BASOPHILS # BLD AUTO: 0.01 K/UL (ref 0–0.2)
BASOPHILS NFR BLD: 0.1 % (ref 0–1.9)
BILIRUB UR QL STRIP: NEGATIVE
BUN SERPL-MCNC: 25 MG/DL (ref 8–23)
CALCIUM SERPL-MCNC: 8.7 MG/DL (ref 8.7–10.5)
CHLORIDE SERPL-SCNC: 93 MMOL/L (ref 95–110)
CLARITY UR: CLEAR
CO2 SERPL-SCNC: 38 MMOL/L (ref 23–29)
COLOR UR: YELLOW
CREAT SERPL-MCNC: 1.1 MG/DL (ref 0.5–1.4)
DIFFERENTIAL METHOD: ABNORMAL
EOSINOPHIL # BLD AUTO: 0.2 K/UL (ref 0–0.5)
EOSINOPHIL NFR BLD: 2.5 % (ref 0–8)
ERYTHROCYTE [DISTWIDTH] IN BLOOD BY AUTOMATED COUNT: 14.7 % (ref 11.5–14.5)
EST. GFR  (AFRICAN AMERICAN): 57 ML/MIN/1.73 M^2
EST. GFR  (NON AFRICAN AMERICAN): 49 ML/MIN/1.73 M^2
GLUCOSE SERPL-MCNC: 227 MG/DL (ref 70–110)
GLUCOSE UR QL STRIP: ABNORMAL
GRAM STN SPEC: ABNORMAL
HCT VFR BLD AUTO: 35.8 % (ref 37–48.5)
HGB BLD-MCNC: 11.3 G/DL (ref 12–16)
HGB UR QL STRIP: ABNORMAL
HYALINE CASTS #/AREA URNS LPF: 5 /LPF
IMM GRANULOCYTES # BLD AUTO: 0.03 K/UL (ref 0–0.04)
IMM GRANULOCYTES NFR BLD AUTO: 0.4 % (ref 0–0.5)
KETONES UR QL STRIP: NEGATIVE
LEUKOCYTE ESTERASE UR QL STRIP: ABNORMAL
LYMPHOCYTES # BLD AUTO: 1.2 K/UL (ref 1–4.8)
LYMPHOCYTES NFR BLD: 17.9 % (ref 18–48)
MCH RBC QN AUTO: 29.9 PG (ref 27–31)
MCHC RBC AUTO-ENTMCNC: 31.6 G/DL (ref 32–36)
MCV RBC AUTO: 95 FL (ref 82–98)
MICROSCOPIC COMMENT: ABNORMAL
MONOCYTES # BLD AUTO: 0.6 K/UL (ref 0.3–1)
MONOCYTES NFR BLD: 8.2 % (ref 4–15)
NEUTROPHILS # BLD AUTO: 4.8 K/UL (ref 1.8–7.7)
NEUTROPHILS NFR BLD: 70.9 % (ref 38–73)
NITRITE UR QL STRIP: NEGATIVE
NRBC BLD-RTO: 0 /100 WBC
PH UR STRIP: 5 [PH] (ref 5–8)
PLATELET # BLD AUTO: 182 K/UL (ref 150–450)
PMV BLD AUTO: 10.2 FL (ref 9.2–12.9)
POCT GLUCOSE: 130 MG/DL (ref 70–110)
POCT GLUCOSE: 189 MG/DL (ref 70–110)
POCT GLUCOSE: 233 MG/DL (ref 70–110)
POCT GLUCOSE: 357 MG/DL (ref 70–110)
POTASSIUM SERPL-SCNC: 4.9 MMOL/L (ref 3.5–5.1)
PROT UR QL STRIP: NEGATIVE
RBC # BLD AUTO: 3.78 M/UL (ref 4–5.4)
RBC #/AREA URNS HPF: 3 /HPF (ref 0–4)
SODIUM SERPL-SCNC: 137 MMOL/L (ref 136–145)
SP GR UR STRIP: 1.02 (ref 1–1.03)
URN SPEC COLLECT METH UR: ABNORMAL
UROBILINOGEN UR STRIP-ACNC: NEGATIVE EU/DL
WBC # BLD AUTO: 6.82 K/UL (ref 3.9–12.7)
WBC #/AREA URNS HPF: 7 /HPF (ref 0–5)
YEAST URNS QL MICRO: ABNORMAL

## 2021-04-03 PROCEDURE — G0378 HOSPITAL OBSERVATION PER HR: HCPCS

## 2021-04-03 PROCEDURE — 63700000 PHARM REV CODE 250 ALT 637 W/O HCPCS: Performed by: NURSE PRACTITIONER

## 2021-04-03 PROCEDURE — 25000242 PHARM REV CODE 250 ALT 637 W/ HCPCS: Performed by: NURSE PRACTITIONER

## 2021-04-03 PROCEDURE — 96372 THER/PROPH/DIAG INJ SC/IM: CPT | Mod: 59 | Performed by: EMERGENCY MEDICINE

## 2021-04-03 PROCEDURE — 97110 THERAPEUTIC EXERCISES: CPT

## 2021-04-03 PROCEDURE — 25000003 PHARM REV CODE 250: Performed by: NURSE PRACTITIONER

## 2021-04-03 PROCEDURE — 27000221 HC OXYGEN, UP TO 24 HOURS

## 2021-04-03 PROCEDURE — 80048 BASIC METABOLIC PNL TOTAL CA: CPT | Performed by: NURSE PRACTITIONER

## 2021-04-03 PROCEDURE — 97116 GAIT TRAINING THERAPY: CPT

## 2021-04-03 PROCEDURE — 81000 URINALYSIS NONAUTO W/SCOPE: CPT | Performed by: INTERNAL MEDICINE

## 2021-04-03 PROCEDURE — 36415 COLL VENOUS BLD VENIPUNCTURE: CPT | Performed by: NURSE PRACTITIONER

## 2021-04-03 PROCEDURE — 94761 N-INVAS EAR/PLS OXIMETRY MLT: CPT

## 2021-04-03 PROCEDURE — 85025 COMPLETE CBC W/AUTO DIFF WBC: CPT | Performed by: NURSE PRACTITIONER

## 2021-04-03 PROCEDURE — 99900035 HC TECH TIME PER 15 MIN (STAT)

## 2021-04-03 PROCEDURE — 94640 AIRWAY INHALATION TREATMENT: CPT

## 2021-04-03 RX ORDER — LEVOFLOXACIN 250 MG/1
500 TABLET ORAL DAILY
Status: DISCONTINUED | OUTPATIENT
Start: 2021-04-03 | End: 2021-04-05 | Stop reason: HOSPADM

## 2021-04-03 RX ADMIN — APIXABAN 5 MG: 2.5 TABLET, FILM COATED ORAL at 09:04

## 2021-04-03 RX ADMIN — SACUBITRIL AND VALSARTAN 2 TABLET: 49; 51 TABLET, FILM COATED ORAL at 08:04

## 2021-04-03 RX ADMIN — INSULIN ASPART 2 UNITS: 100 INJECTION, SOLUTION INTRAVENOUS; SUBCUTANEOUS at 05:04

## 2021-04-03 RX ADMIN — OXYCODONE HYDROCHLORIDE AND ACETAMINOPHEN 1000 MG: 500 TABLET ORAL at 08:04

## 2021-04-03 RX ADMIN — SACUBITRIL AND VALSARTAN 2 TABLET: 49; 51 TABLET, FILM COATED ORAL at 09:04

## 2021-04-03 RX ADMIN — SPIRONOLACTONE 25 MG: 25 TABLET ORAL at 08:04

## 2021-04-03 RX ADMIN — BUMETANIDE 0.5 MG: 0.5 TABLET ORAL at 08:04

## 2021-04-03 RX ADMIN — MAGNESIUM OXIDE TAB 400 MG (241.3 MG ELEMENTAL MG) 200 MG: 400 (241.3 MG) TAB at 08:04

## 2021-04-03 RX ADMIN — PROPRANOLOL HYDROCHLORIDE 160 MG: 80 CAPSULE, EXTENDED RELEASE ORAL at 08:04

## 2021-04-03 RX ADMIN — AZITHROMYCIN MONOHYDRATE 500 MG: 250 TABLET ORAL at 08:04

## 2021-04-03 RX ADMIN — PANTOPRAZOLE SODIUM 40 MG: 40 TABLET, DELAYED RELEASE ORAL at 08:04

## 2021-04-03 RX ADMIN — FLUTICASONE PROPIONATE 100 MCG: 50 SPRAY, METERED NASAL at 09:04

## 2021-04-03 RX ADMIN — INSULIN ASPART 5 UNITS: 100 INJECTION, SOLUTION INTRAVENOUS; SUBCUTANEOUS at 12:04

## 2021-04-03 RX ADMIN — LEVOFLOXACIN 500 MG: 250 TABLET, FILM COATED ORAL at 12:04

## 2021-04-03 RX ADMIN — APIXABAN 5 MG: 2.5 TABLET, FILM COATED ORAL at 08:04

## 2021-04-03 RX ADMIN — LEVOTHYROXINE SODIUM 75 MCG: 0.03 TABLET ORAL at 05:04

## 2021-04-03 RX ADMIN — IPRATROPIUM BROMIDE AND ALBUTEROL SULFATE 3 ML: .5; 3 SOLUTION RESPIRATORY (INHALATION) at 07:04

## 2021-04-04 PROBLEM — G93.41 ENCEPHALOPATHY, METABOLIC: Status: ACTIVE | Noted: 2021-04-04

## 2021-04-04 LAB
POCT GLUCOSE: 153 MG/DL (ref 70–110)
POCT GLUCOSE: 208 MG/DL (ref 70–110)
POCT GLUCOSE: 236 MG/DL (ref 70–110)
POCT GLUCOSE: 318 MG/DL (ref 70–110)

## 2021-04-04 PROCEDURE — 94761 N-INVAS EAR/PLS OXIMETRY MLT: CPT

## 2021-04-04 PROCEDURE — 27000221 HC OXYGEN, UP TO 24 HOURS

## 2021-04-04 PROCEDURE — 25000003 PHARM REV CODE 250: Performed by: NURSE PRACTITIONER

## 2021-04-04 PROCEDURE — 97116 GAIT TRAINING THERAPY: CPT

## 2021-04-04 PROCEDURE — 94640 AIRWAY INHALATION TREATMENT: CPT

## 2021-04-04 PROCEDURE — 97110 THERAPEUTIC EXERCISES: CPT

## 2021-04-04 PROCEDURE — 99900035 HC TECH TIME PER 15 MIN (STAT)

## 2021-04-04 PROCEDURE — 25000242 PHARM REV CODE 250 ALT 637 W/ HCPCS: Performed by: NURSE PRACTITIONER

## 2021-04-04 PROCEDURE — G0378 HOSPITAL OBSERVATION PER HR: HCPCS

## 2021-04-04 PROCEDURE — 96372 THER/PROPH/DIAG INJ SC/IM: CPT | Mod: 59 | Performed by: EMERGENCY MEDICINE

## 2021-04-04 RX ADMIN — PANTOPRAZOLE SODIUM 40 MG: 40 TABLET, DELAYED RELEASE ORAL at 09:04

## 2021-04-04 RX ADMIN — SACUBITRIL AND VALSARTAN 2 TABLET: 49; 51 TABLET, FILM COATED ORAL at 09:04

## 2021-04-04 RX ADMIN — APIXABAN 5 MG: 2.5 TABLET, FILM COATED ORAL at 10:04

## 2021-04-04 RX ADMIN — IPRATROPIUM BROMIDE AND ALBUTEROL SULFATE 3 ML: .5; 3 SOLUTION RESPIRATORY (INHALATION) at 08:04

## 2021-04-04 RX ADMIN — APIXABAN 5 MG: 2.5 TABLET, FILM COATED ORAL at 09:04

## 2021-04-04 RX ADMIN — BUMETANIDE 0.5 MG: 0.5 TABLET ORAL at 09:04

## 2021-04-04 RX ADMIN — LEVOTHYROXINE SODIUM 75 MCG: 0.03 TABLET ORAL at 05:04

## 2021-04-04 RX ADMIN — MELATONIN TAB 3 MG 6 MG: 3 TAB at 10:04

## 2021-04-04 RX ADMIN — MAGNESIUM OXIDE TAB 400 MG (241.3 MG ELEMENTAL MG) 200 MG: 400 (241.3 MG) TAB at 09:04

## 2021-04-04 RX ADMIN — INSULIN ASPART 4 UNITS: 100 INJECTION, SOLUTION INTRAVENOUS; SUBCUTANEOUS at 05:04

## 2021-04-04 RX ADMIN — LEVOFLOXACIN 500 MG: 250 TABLET, FILM COATED ORAL at 09:04

## 2021-04-04 RX ADMIN — OXYCODONE HYDROCHLORIDE AND ACETAMINOPHEN 1000 MG: 500 TABLET ORAL at 09:04

## 2021-04-04 RX ADMIN — SPIRONOLACTONE 25 MG: 25 TABLET ORAL at 09:04

## 2021-04-04 RX ADMIN — FLUTICASONE PROPIONATE 100 MCG: 50 SPRAY, METERED NASAL at 10:04

## 2021-04-04 RX ADMIN — PROPRANOLOL HYDROCHLORIDE 160 MG: 80 CAPSULE, EXTENDED RELEASE ORAL at 09:04

## 2021-04-04 RX ADMIN — INSULIN ASPART 2 UNITS: 100 INJECTION, SOLUTION INTRAVENOUS; SUBCUTANEOUS at 11:04

## 2021-04-05 ENCOUNTER — PATIENT MESSAGE (OUTPATIENT)
Dept: PULMONOLOGY | Facility: CLINIC | Age: 76
End: 2021-04-05

## 2021-04-05 VITALS
HEIGHT: 65 IN | HEART RATE: 86 BPM | DIASTOLIC BLOOD PRESSURE: 51 MMHG | RESPIRATION RATE: 18 BRPM | OXYGEN SATURATION: 96 % | WEIGHT: 167 LBS | BODY MASS INDEX: 27.82 KG/M2 | SYSTOLIC BLOOD PRESSURE: 99 MMHG | TEMPERATURE: 97 F

## 2021-04-05 LAB
ALBUMIN SERPL BCP-MCNC: 2.9 G/DL (ref 3.5–5.2)
ALP SERPL-CCNC: 52 U/L (ref 55–135)
ALT SERPL W/O P-5'-P-CCNC: 30 U/L (ref 10–44)
ANION GAP SERPL CALC-SCNC: 7 MMOL/L (ref 8–16)
AST SERPL-CCNC: 18 U/L (ref 10–40)
BASOPHILS # BLD AUTO: 0.01 K/UL (ref 0–0.2)
BASOPHILS NFR BLD: 0.2 % (ref 0–1.9)
BILIRUB SERPL-MCNC: 0.5 MG/DL (ref 0.1–1)
BSA FOR ECHO PROCEDURE: 1.86 M2
BUN SERPL-MCNC: 25 MG/DL (ref 8–23)
CALCIUM SERPL-MCNC: 8.8 MG/DL (ref 8.7–10.5)
CHLORIDE SERPL-SCNC: 97 MMOL/L (ref 95–110)
CHOLEST SERPL-MCNC: 128 MG/DL (ref 120–199)
CHOLEST/HDLC SERPL: 5.3 {RATIO} (ref 2–5)
CO2 SERPL-SCNC: 33 MMOL/L (ref 23–29)
CREAT SERPL-MCNC: 1.1 MG/DL (ref 0.5–1.4)
DIFFERENTIAL METHOD: ABNORMAL
EOSINOPHIL # BLD AUTO: 0.1 K/UL (ref 0–0.5)
EOSINOPHIL NFR BLD: 2 % (ref 0–8)
ERYTHROCYTE [DISTWIDTH] IN BLOOD BY AUTOMATED COUNT: 14.9 % (ref 11.5–14.5)
EST. GFR  (AFRICAN AMERICAN): 57 ML/MIN/1.73 M^2
EST. GFR  (NON AFRICAN AMERICAN): 49 ML/MIN/1.73 M^2
GLUCOSE SERPL-MCNC: 185 MG/DL (ref 70–110)
HCT VFR BLD AUTO: 34.8 % (ref 37–48.5)
HDLC SERPL-MCNC: 24 MG/DL (ref 40–75)
HDLC SERPL: 18.8 % (ref 20–50)
HGB BLD-MCNC: 10.7 G/DL (ref 12–16)
IMM GRANULOCYTES # BLD AUTO: 0.04 K/UL (ref 0–0.04)
IMM GRANULOCYTES NFR BLD AUTO: 0.7 % (ref 0–0.5)
LDLC SERPL CALC-MCNC: 79.2 MG/DL (ref 63–159)
LYMPHOCYTES # BLD AUTO: 1.4 K/UL (ref 1–4.8)
LYMPHOCYTES NFR BLD: 23.2 % (ref 18–48)
MCH RBC QN AUTO: 29.1 PG (ref 27–31)
MCHC RBC AUTO-ENTMCNC: 30.7 G/DL (ref 32–36)
MCV RBC AUTO: 95 FL (ref 82–98)
MONOCYTES # BLD AUTO: 0.5 K/UL (ref 0.3–1)
MONOCYTES NFR BLD: 8.8 % (ref 4–15)
NEUTROPHILS # BLD AUTO: 3.9 K/UL (ref 1.8–7.7)
NEUTROPHILS NFR BLD: 65.1 % (ref 38–73)
NONHDLC SERPL-MCNC: 104 MG/DL
NRBC BLD-RTO: 0 /100 WBC
PLATELET # BLD AUTO: 189 K/UL (ref 150–450)
PMV BLD AUTO: 10.9 FL (ref 9.2–12.9)
POCT GLUCOSE: 201 MG/DL (ref 70–110)
POTASSIUM SERPL-SCNC: 4.6 MMOL/L (ref 3.5–5.1)
PROT SERPL-MCNC: 5.4 G/DL (ref 6–8.4)
RBC # BLD AUTO: 3.68 M/UL (ref 4–5.4)
SODIUM SERPL-SCNC: 137 MMOL/L (ref 136–145)
TRIGL SERPL-MCNC: 124 MG/DL (ref 30–150)
WBC # BLD AUTO: 6 K/UL (ref 3.9–12.7)

## 2021-04-05 PROCEDURE — 95819 PR EEG,W/AWAKE & ASLEEP RECORD: ICD-10-PCS | Mod: 26,,, | Performed by: PSYCHIATRY & NEUROLOGY

## 2021-04-05 PROCEDURE — 80053 COMPREHEN METABOLIC PANEL: CPT | Performed by: NURSE PRACTITIONER

## 2021-04-05 PROCEDURE — 95819 EEG AWAKE AND ASLEEP: CPT

## 2021-04-05 PROCEDURE — 97116 GAIT TRAINING THERAPY: CPT

## 2021-04-05 PROCEDURE — 80061 LIPID PANEL: CPT | Performed by: PHYSICIAN ASSISTANT

## 2021-04-05 PROCEDURE — G0378 HOSPITAL OBSERVATION PER HR: HCPCS

## 2021-04-05 PROCEDURE — 99900035 HC TECH TIME PER 15 MIN (STAT)

## 2021-04-05 PROCEDURE — 36415 COLL VENOUS BLD VENIPUNCTURE: CPT | Performed by: NURSE PRACTITIONER

## 2021-04-05 PROCEDURE — 25000003 PHARM REV CODE 250: Performed by: NURSE PRACTITIONER

## 2021-04-05 PROCEDURE — 95819 EEG AWAKE AND ASLEEP: CPT | Mod: 26,,, | Performed by: PSYCHIATRY & NEUROLOGY

## 2021-04-05 PROCEDURE — 85025 COMPLETE CBC W/AUTO DIFF WBC: CPT | Performed by: NURSE PRACTITIONER

## 2021-04-05 PROCEDURE — 36415 COLL VENOUS BLD VENIPUNCTURE: CPT | Performed by: PHYSICIAN ASSISTANT

## 2021-04-05 RX ORDER — LEVOFLOXACIN 500 MG/1
500 TABLET, FILM COATED ORAL DAILY
Qty: 10 TABLET | Refills: 0
Start: 2021-04-06 | End: 2021-04-16

## 2021-04-05 RX ORDER — LEVOFLOXACIN 500 MG/1
500 TABLET, FILM COATED ORAL DAILY
Qty: 10 TABLET | Refills: 0 | Status: SHIPPED | OUTPATIENT
Start: 2021-04-05 | End: 2021-04-15

## 2021-04-05 RX ADMIN — APIXABAN 5 MG: 2.5 TABLET, FILM COATED ORAL at 09:04

## 2021-04-05 RX ADMIN — SACUBITRIL AND VALSARTAN 2 TABLET: 49; 51 TABLET, FILM COATED ORAL at 09:04

## 2021-04-05 RX ADMIN — SPIRONOLACTONE 25 MG: 25 TABLET ORAL at 09:04

## 2021-04-05 RX ADMIN — POTASSIUM & SODIUM PHOSPHATES POWDER PACK 280-160-250 MG 2 PACKET: 280-160-250 PACK at 09:04

## 2021-04-05 RX ADMIN — LEVOFLOXACIN 500 MG: 250 TABLET, FILM COATED ORAL at 09:04

## 2021-04-05 RX ADMIN — MAGNESIUM OXIDE TAB 400 MG (241.3 MG ELEMENTAL MG) 200 MG: 400 (241.3 MG) TAB at 09:04

## 2021-04-05 RX ADMIN — OXYCODONE HYDROCHLORIDE AND ACETAMINOPHEN 1000 MG: 500 TABLET ORAL at 09:04

## 2021-04-05 RX ADMIN — PANTOPRAZOLE SODIUM 40 MG: 40 TABLET, DELAYED RELEASE ORAL at 09:04

## 2021-04-05 RX ADMIN — BUMETANIDE 0.5 MG: 0.5 TABLET ORAL at 09:04

## 2021-04-05 RX ADMIN — LEVOTHYROXINE SODIUM 75 MCG: 0.03 TABLET ORAL at 06:04

## 2021-04-05 RX ADMIN — PROPRANOLOL HYDROCHLORIDE 160 MG: 80 CAPSULE, EXTENDED RELEASE ORAL at 09:04

## 2021-04-06 PROCEDURE — G0180 PR HOME HEALTH MD CERTIFICATION: ICD-10-PCS | Mod: ,,, | Performed by: FAMILY MEDICINE

## 2021-04-06 PROCEDURE — G0180 MD CERTIFICATION HHA PATIENT: HCPCS | Mod: ,,, | Performed by: FAMILY MEDICINE

## 2021-04-08 PROBLEM — Z09 HOSPITAL DISCHARGE FOLLOW-UP: Status: ACTIVE | Noted: 2021-04-08

## 2021-04-09 ENCOUNTER — CARE AT HOME (OUTPATIENT)
Dept: HOME HEALTH SERVICES | Facility: CLINIC | Age: 76
End: 2021-04-09
Payer: MEDICARE

## 2021-04-09 DIAGNOSIS — R53.1 WEAKNESS: ICD-10-CM

## 2021-04-09 DIAGNOSIS — I15.2 HYPERTENSION ASSOCIATED WITH DIABETES: ICD-10-CM

## 2021-04-09 DIAGNOSIS — I48.19 PERSISTENT ATRIAL FIBRILLATION: ICD-10-CM

## 2021-04-09 DIAGNOSIS — U07.1 PNEUMONIA DUE TO COVID-19 VIRUS: ICD-10-CM

## 2021-04-09 DIAGNOSIS — N18.30 STAGE 3 CHRONIC KIDNEY DISEASE, UNSPECIFIED WHETHER STAGE 3A OR 3B CKD: ICD-10-CM

## 2021-04-09 DIAGNOSIS — I27.20 PULMONARY HYPERTENSION: Primary | ICD-10-CM

## 2021-04-09 DIAGNOSIS — Z79.01 ANTICOAGULANT LONG-TERM USE: ICD-10-CM

## 2021-04-09 DIAGNOSIS — J12.82 PNEUMONIA DUE TO COVID-19 VIRUS: ICD-10-CM

## 2021-04-09 DIAGNOSIS — E11.59 HYPERTENSION ASSOCIATED WITH DIABETES: ICD-10-CM

## 2021-04-09 DIAGNOSIS — E03.9 ACQUIRED HYPOTHYROIDISM: ICD-10-CM

## 2021-04-09 DIAGNOSIS — J96.11 CHRONIC RESPIRATORY FAILURE WITH HYPOXIA: ICD-10-CM

## 2021-04-09 DIAGNOSIS — Z09 HOSPITAL DISCHARGE FOLLOW-UP: ICD-10-CM

## 2021-04-09 DIAGNOSIS — E11.8 TYPE 2 DIABETES MELLITUS WITH COMPLICATION, WITHOUT LONG-TERM CURRENT USE OF INSULIN: ICD-10-CM

## 2021-04-09 DIAGNOSIS — I50.33 ACUTE ON CHRONIC DIASTOLIC HEART FAILURE: ICD-10-CM

## 2021-04-09 DIAGNOSIS — I42.8 OTHER CARDIOMYOPATHY: ICD-10-CM

## 2021-04-09 DIAGNOSIS — Z71.89 ADVANCED DIRECTIVES, COUNSELING/DISCUSSION: ICD-10-CM

## 2021-04-09 PROCEDURE — 99497 ADVNCD CARE PLAN 30 MIN: CPT | Mod: 25,S$GLB,, | Performed by: NURSE PRACTITIONER

## 2021-04-09 PROCEDURE — 99350 HOME/RES VST EST HIGH MDM 60: CPT | Mod: S$GLB,,, | Performed by: NURSE PRACTITIONER

## 2021-04-09 PROCEDURE — 99350 PR HOME VISIT,ESTAB PATIENT,LEVEL IV: ICD-10-PCS | Mod: S$GLB,,, | Performed by: NURSE PRACTITIONER

## 2021-04-09 PROCEDURE — 99497 PR ADVNCD CARE PLAN 30 MIN: ICD-10-PCS | Mod: 25,S$GLB,, | Performed by: NURSE PRACTITIONER

## 2021-04-11 VITALS
TEMPERATURE: 98 F | RESPIRATION RATE: 20 BRPM | SYSTOLIC BLOOD PRESSURE: 90 MMHG | OXYGEN SATURATION: 98 % | DIASTOLIC BLOOD PRESSURE: 60 MMHG | HEIGHT: 65 IN | WEIGHT: 170 LBS | BODY MASS INDEX: 28.32 KG/M2 | HEART RATE: 80 BPM

## 2021-04-11 PROBLEM — Z71.89 ADVANCED DIRECTIVES, COUNSELING/DISCUSSION: Status: ACTIVE | Noted: 2021-04-11

## 2021-04-12 ENCOUNTER — TELEPHONE (OUTPATIENT)
Dept: FAMILY MEDICINE | Facility: CLINIC | Age: 76
End: 2021-04-12

## 2021-04-12 ENCOUNTER — OUTPATIENT CASE MANAGEMENT (OUTPATIENT)
Dept: ADMINISTRATIVE | Facility: OTHER | Age: 76
End: 2021-04-12

## 2021-04-12 DIAGNOSIS — E11.8 TYPE 2 DIABETES MELLITUS WITH COMPLICATION, WITHOUT LONG-TERM CURRENT USE OF INSULIN: Primary | ICD-10-CM

## 2021-04-12 RX ORDER — LANCETS
EACH MISCELLANEOUS
Qty: 100 EACH | Refills: 3 | Status: SHIPPED | OUTPATIENT
Start: 2021-04-12 | End: 2021-04-27

## 2021-04-12 RX ORDER — INSULIN PUMP SYRINGE, 3 ML
EACH MISCELLANEOUS
Qty: 1 EACH | Refills: 0 | Status: SHIPPED | OUTPATIENT
Start: 2021-04-12 | End: 2021-04-27

## 2021-04-13 ENCOUNTER — PATIENT MESSAGE (OUTPATIENT)
Dept: PULMONOLOGY | Facility: CLINIC | Age: 76
End: 2021-04-13

## 2021-04-13 DIAGNOSIS — R06.00 DYSPNEA, UNSPECIFIED TYPE: Primary | ICD-10-CM

## 2021-04-13 DIAGNOSIS — R09.02 HYPOXEMIA: ICD-10-CM

## 2021-04-13 RX ORDER — LANCETS
EACH MISCELLANEOUS
Refills: 0 | OUTPATIENT
Start: 2021-04-13

## 2021-04-13 RX ORDER — INSULIN PUMP SYRINGE, 3 ML
EACH MISCELLANEOUS
Refills: 0 | OUTPATIENT
Start: 2021-04-13 | End: 2022-04-12

## 2021-04-14 ENCOUNTER — EXTERNAL HOME HEALTH (OUTPATIENT)
Dept: HOME HEALTH SERVICES | Facility: HOSPITAL | Age: 76
End: 2021-04-14
Payer: MEDICARE

## 2021-04-15 ENCOUNTER — TELEPHONE (OUTPATIENT)
Dept: CARDIOLOGY | Facility: CLINIC | Age: 76
End: 2021-04-15

## 2021-04-15 ENCOUNTER — PATIENT MESSAGE (OUTPATIENT)
Dept: PULMONOLOGY | Facility: CLINIC | Age: 76
End: 2021-04-15

## 2021-04-27 ENCOUNTER — EXTERNAL VISIT (OUTPATIENT)
Dept: PRIMARY CARE CLINIC | Facility: CLINIC | Age: 76
End: 2021-04-27
Payer: MEDICARE

## 2021-04-27 DIAGNOSIS — J96.11 CHRONIC RESPIRATORY FAILURE WITH HYPOXIA: ICD-10-CM

## 2021-04-27 DIAGNOSIS — D50.0 IRON DEFICIENCY ANEMIA DUE TO CHRONIC BLOOD LOSS: ICD-10-CM

## 2021-04-27 DIAGNOSIS — R53.1 WEAKNESS: ICD-10-CM

## 2021-04-27 DIAGNOSIS — N18.32 STAGE 3B CHRONIC KIDNEY DISEASE: ICD-10-CM

## 2021-04-27 DIAGNOSIS — E03.9 ACQUIRED HYPOTHYROIDISM: ICD-10-CM

## 2021-04-27 DIAGNOSIS — Z86.73 HISTORY OF CVA (CEREBROVASCULAR ACCIDENT): ICD-10-CM

## 2021-04-27 DIAGNOSIS — U07.1 LOWER RESPIRATORY TRACT INFECTION DUE TO COVID-19 VIRUS: ICD-10-CM

## 2021-04-27 DIAGNOSIS — L25.8 INCONTINENCE ASSOCIATED DERMATITIS: ICD-10-CM

## 2021-04-27 DIAGNOSIS — E11.69 HYPERLIPIDEMIA ASSOCIATED WITH TYPE 2 DIABETES MELLITUS: Primary | ICD-10-CM

## 2021-04-27 DIAGNOSIS — I50.23 ACUTE ON CHRONIC SYSTOLIC CONGESTIVE HEART FAILURE: ICD-10-CM

## 2021-04-27 DIAGNOSIS — R32 INCONTINENCE ASSOCIATED DERMATITIS: ICD-10-CM

## 2021-04-27 DIAGNOSIS — Z71.89 ADVANCED DIRECTIVES, COUNSELING/DISCUSSION: ICD-10-CM

## 2021-04-27 DIAGNOSIS — J22 LOWER RESPIRATORY TRACT INFECTION DUE TO COVID-19 VIRUS: ICD-10-CM

## 2021-04-27 DIAGNOSIS — M53.3 SACRAL PAIN: ICD-10-CM

## 2021-04-27 DIAGNOSIS — E11.8 TYPE 2 DIABETES MELLITUS WITH COMPLICATION, WITHOUT LONG-TERM CURRENT USE OF INSULIN: ICD-10-CM

## 2021-04-27 DIAGNOSIS — E78.5 HYPERLIPIDEMIA ASSOCIATED WITH TYPE 2 DIABETES MELLITUS: Primary | ICD-10-CM

## 2021-04-27 PROCEDURE — 99350 HOME/RES VST EST HIGH MDM 60: CPT | Mod: S$GLB,,, | Performed by: INTERNAL MEDICINE

## 2021-04-27 PROCEDURE — 99350 PR HOME VISIT,ESTAB PATIENT,LEVEL IV: ICD-10-PCS | Mod: S$GLB,,, | Performed by: INTERNAL MEDICINE

## 2021-04-27 RX ORDER — LANCETS
EACH MISCELLANEOUS
Qty: 50 EACH | Refills: 3 | Status: SHIPPED | OUTPATIENT
Start: 2021-04-27

## 2021-04-27 RX ORDER — ATORVASTATIN CALCIUM 40 MG/1
40 TABLET, FILM COATED ORAL DAILY
Qty: 90 TABLET | Refills: 0 | Status: ON HOLD | OUTPATIENT
Start: 2021-04-27 | End: 2021-05-11 | Stop reason: HOSPADM

## 2021-04-27 RX ORDER — INSULIN PUMP SYRINGE, 3 ML
EACH MISCELLANEOUS
Qty: 1 EACH | Refills: 0 | Status: SHIPPED | OUTPATIENT
Start: 2021-04-27 | End: 2022-04-27

## 2021-05-03 ENCOUNTER — TELEPHONE (OUTPATIENT)
Dept: PRIMARY CARE CLINIC | Facility: CLINIC | Age: 76
End: 2021-05-03

## 2021-05-03 ENCOUNTER — TELEPHONE (OUTPATIENT)
Dept: PULMONOLOGY | Facility: CLINIC | Age: 76
End: 2021-05-03

## 2021-05-03 DIAGNOSIS — R30.0 DYSURIA: Primary | ICD-10-CM

## 2021-05-04 ENCOUNTER — TELEPHONE (OUTPATIENT)
Dept: PRIMARY CARE CLINIC | Facility: CLINIC | Age: 76
End: 2021-05-04

## 2021-05-07 PROCEDURE — 96375 TX/PRO/DX INJ NEW DRUG ADDON: CPT

## 2021-05-07 PROCEDURE — 82962 GLUCOSE BLOOD TEST: CPT

## 2021-05-07 PROCEDURE — 99291 CRITICAL CARE FIRST HOUR: CPT | Mod: 25

## 2021-05-07 PROCEDURE — 96374 THER/PROPH/DIAG INJ IV PUSH: CPT

## 2021-05-08 ENCOUNTER — HOSPITAL ENCOUNTER (INPATIENT)
Facility: HOSPITAL | Age: 76
LOS: 3 days | Discharge: HOME HEALTH | DRG: 291 | End: 2021-05-11
Attending: EMERGENCY MEDICINE | Admitting: INTERNAL MEDICINE
Payer: MEDICARE

## 2021-05-08 VITALS
OXYGEN SATURATION: 96 % | SYSTOLIC BLOOD PRESSURE: 130 MMHG | RESPIRATION RATE: 14 BRPM | TEMPERATURE: 98 F | DIASTOLIC BLOOD PRESSURE: 67 MMHG | HEART RATE: 96 BPM

## 2021-05-08 DIAGNOSIS — R73.9 HYPERGLYCEMIA: ICD-10-CM

## 2021-05-08 DIAGNOSIS — I48.91 ATRIAL FIBRILLATION: ICD-10-CM

## 2021-05-08 DIAGNOSIS — I50.9 CHF EXACERBATION: ICD-10-CM

## 2021-05-08 DIAGNOSIS — I50.43 ACUTE ON CHRONIC COMBINED SYSTOLIC AND DIASTOLIC CONGESTIVE HEART FAILURE: Primary | ICD-10-CM

## 2021-05-08 DIAGNOSIS — E11.8 TYPE 2 DIABETES MELLITUS WITH COMPLICATION, WITHOUT LONG-TERM CURRENT USE OF INSULIN: ICD-10-CM

## 2021-05-08 DIAGNOSIS — I50.23 ACUTE ON CHRONIC SYSTOLIC CONGESTIVE HEART FAILURE: ICD-10-CM

## 2021-05-08 DIAGNOSIS — R44.3 HALLUCINATION: ICD-10-CM

## 2021-05-08 DIAGNOSIS — I48.91 ATRIAL FIBRILLATION WITH RVR: ICD-10-CM

## 2021-05-08 DIAGNOSIS — R06.02 SHORTNESS OF BREATH: ICD-10-CM

## 2021-05-08 PROBLEM — M53.3 SACRAL PAIN: Status: ACTIVE | Noted: 2021-05-08

## 2021-05-08 PROBLEM — N18.9 ACUTE ON CHRONIC KIDNEY FAILURE: Status: ACTIVE | Noted: 2019-04-29

## 2021-05-08 PROBLEM — J12.82 PNEUMONIA DUE TO COVID-19 VIRUS: Status: RESOLVED | Noted: 2021-03-17 | Resolved: 2021-05-08

## 2021-05-08 PROBLEM — E87.5 HYPERKALEMIA: Status: ACTIVE | Noted: 2021-05-08

## 2021-05-08 PROBLEM — U07.1 PNEUMONIA DUE TO COVID-19 VIRUS: Status: RESOLVED | Noted: 2021-03-17 | Resolved: 2021-05-08

## 2021-05-08 PROBLEM — R32 INCONTINENCE ASSOCIATED DERMATITIS: Status: ACTIVE | Noted: 2021-05-08

## 2021-05-08 PROBLEM — R09.89 CARDIAC LV EJECTION FRACTION 10-20%: Status: RESOLVED | Noted: 2017-11-28 | Resolved: 2021-05-08

## 2021-05-08 PROBLEM — Z86.73 HISTORY OF CVA (CEREBROVASCULAR ACCIDENT): Status: ACTIVE | Noted: 2021-05-08

## 2021-05-08 PROBLEM — L25.8 INCONTINENCE ASSOCIATED DERMATITIS: Status: ACTIVE | Noted: 2021-05-08

## 2021-05-08 PROBLEM — N17.9 ACUTE ON CHRONIC KIDNEY FAILURE: Status: ACTIVE | Noted: 2019-04-29

## 2021-05-08 PROBLEM — E87.5 HYPERKALEMIA: Status: RESOLVED | Noted: 2021-05-08 | Resolved: 2021-05-08

## 2021-05-08 LAB
ALBUMIN SERPL BCP-MCNC: 3.3 G/DL (ref 3.5–5.2)
ALBUMIN SERPL BCP-MCNC: 3.4 G/DL (ref 3.5–5.2)
ALP SERPL-CCNC: 146 U/L (ref 55–135)
ALP SERPL-CCNC: 150 U/L (ref 55–135)
ALT SERPL W/O P-5'-P-CCNC: 147 U/L (ref 10–44)
ALT SERPL W/O P-5'-P-CCNC: 149 U/L (ref 10–44)
ANION GAP SERPL CALC-SCNC: 12 MMOL/L (ref 8–16)
ANION GAP SERPL CALC-SCNC: 16 MMOL/L (ref 8–16)
AORTIC ROOT ANNULUS: 3.63 CM
AORTIC VALVE CUSP SEPERATION: 2.26 CM
ASCENDING AORTA: 4.18 CM
AST SERPL-CCNC: 137 U/L (ref 10–40)
AST SERPL-CCNC: 141 U/L (ref 10–40)
AV INDEX (PROSTH): 0.71
AV MEAN GRADIENT: 2 MMHG
AV PEAK GRADIENT: 4 MMHG
AV VALVE AREA: 3.32 CM2
AV VELOCITY RATIO: 0.84
BACTERIA #/AREA URNS HPF: ABNORMAL /HPF
BASOPHILS # BLD AUTO: 0.02 K/UL (ref 0–0.2)
BASOPHILS # BLD AUTO: 0.02 K/UL (ref 0–0.2)
BASOPHILS NFR BLD: 0.2 % (ref 0–1.9)
BASOPHILS NFR BLD: 0.2 % (ref 0–1.9)
BILIRUB SERPL-MCNC: 1.1 MG/DL (ref 0.1–1)
BILIRUB SERPL-MCNC: 1.1 MG/DL (ref 0.1–1)
BILIRUB UR QL STRIP: NEGATIVE
BNP SERPL-MCNC: >4900 PG/ML (ref 0–99)
BSA FOR ECHO PROCEDURE: 1.92 M2
BUN SERPL-MCNC: 59 MG/DL (ref 8–23)
BUN SERPL-MCNC: 60 MG/DL (ref 8–23)
CALCIUM SERPL-MCNC: 9.4 MG/DL (ref 8.7–10.5)
CALCIUM SERPL-MCNC: 9.5 MG/DL (ref 8.7–10.5)
CHLORIDE SERPL-SCNC: 92 MMOL/L (ref 95–110)
CHLORIDE SERPL-SCNC: 93 MMOL/L (ref 95–110)
CLARITY UR: CLEAR
CO2 SERPL-SCNC: 27 MMOL/L (ref 23–29)
CO2 SERPL-SCNC: 32 MMOL/L (ref 23–29)
COLOR UR: YELLOW
CREAT SERPL-MCNC: 2.3 MG/DL (ref 0.5–1.4)
CREAT SERPL-MCNC: 2.4 MG/DL (ref 0.5–1.4)
CREAT UR-MCNC: 62.9 MG/DL (ref 15–325)
CV ECHO LV RWT: 0.32 CM
DIFFERENTIAL METHOD: ABNORMAL
DIFFERENTIAL METHOD: ABNORMAL
DOP CALC AO PEAK VEL: 0.96 M/S
DOP CALC AO VTI: 20.36 CM
DOP CALC LVOT AREA: 4.7 CM2
DOP CALC LVOT DIAMETER: 2.44 CM
DOP CALC LVOT PEAK VEL: 0.81 M/S
DOP CALC LVOT STROKE VOLUME: 67.67 CM3
DOP CALCLVOT PEAK VEL VTI: 14.48 CM
E WAVE DECELERATION TIME: 173.49 MSEC
E/E' RATIO: 24.89 M/S
ECHO LV POSTERIOR WALL: 0.95 CM (ref 0.6–1.1)
EJECTION FRACTION: 20 %
EOSINOPHIL # BLD AUTO: 0 K/UL (ref 0–0.5)
EOSINOPHIL # BLD AUTO: 0.1 K/UL (ref 0–0.5)
EOSINOPHIL NFR BLD: 0.3 % (ref 0–8)
EOSINOPHIL NFR BLD: 0.7 % (ref 0–8)
ERYTHROCYTE [DISTWIDTH] IN BLOOD BY AUTOMATED COUNT: 16.2 % (ref 11.5–14.5)
ERYTHROCYTE [DISTWIDTH] IN BLOOD BY AUTOMATED COUNT: 16.5 % (ref 11.5–14.5)
EST. GFR  (AFRICAN AMERICAN): 22 ML/MIN/1.73 M^2
EST. GFR  (AFRICAN AMERICAN): 23 ML/MIN/1.73 M^2
EST. GFR  (NON AFRICAN AMERICAN): 19 ML/MIN/1.73 M^2
EST. GFR  (NON AFRICAN AMERICAN): 20 ML/MIN/1.73 M^2
ESTIMATED AVG GLUCOSE: 237 MG/DL (ref 68–131)
ESTIMATED AVG GLUCOSE: 237 MG/DL (ref 68–131)
FRACTIONAL SHORTENING: 12 % (ref 28–44)
GLUCOSE SERPL-MCNC: 215 MG/DL (ref 70–110)
GLUCOSE SERPL-MCNC: 327 MG/DL (ref 70–110)
GLUCOSE UR QL STRIP: NEGATIVE
HBA1C MFR BLD: 9.9 % (ref 4–5.6)
HBA1C MFR BLD: 9.9 % (ref 4–5.6)
HCT VFR BLD AUTO: 32.5 % (ref 37–48.5)
HCT VFR BLD AUTO: 33 % (ref 37–48.5)
HGB BLD-MCNC: 10.4 G/DL (ref 12–16)
HGB BLD-MCNC: 9.9 G/DL (ref 12–16)
HGB UR QL STRIP: ABNORMAL
HYALINE CASTS #/AREA URNS LPF: 4 /LPF
IMM GRANULOCYTES # BLD AUTO: 0.03 K/UL (ref 0–0.04)
IMM GRANULOCYTES # BLD AUTO: 0.04 K/UL (ref 0–0.04)
IMM GRANULOCYTES NFR BLD AUTO: 0.3 % (ref 0–0.5)
IMM GRANULOCYTES NFR BLD AUTO: 0.4 % (ref 0–0.5)
INR PPP: 1.8 (ref 0.8–1.2)
INTERVENTRICULAR SEPTUM: 0.94 CM (ref 0.6–1.1)
IVRT: 82.78 MSEC
KETONES UR QL STRIP: NEGATIVE
LA MAJOR: 7.8 CM
LA MINOR: 7 CM
LA WIDTH: 4.09 CM
LEFT ATRIUM SIZE: 4.28 CM
LEFT ATRIUM VOLUME INDEX MOD: 39.8 ML/M2
LEFT ATRIUM VOLUME INDEX: 58.7 ML/M2
LEFT ATRIUM VOLUME MOD: 74.36 CM3
LEFT ATRIUM VOLUME: 109.79 CM3
LEFT INTERNAL DIMENSION IN SYSTOLE: 5.3 CM (ref 2.1–4)
LEFT VENTRICLE DIASTOLIC VOLUME INDEX: 97.03 ML/M2
LEFT VENTRICLE DIASTOLIC VOLUME: 181.44 ML
LEFT VENTRICLE MASS INDEX: 123 G/M2
LEFT VENTRICLE SYSTOLIC VOLUME INDEX: 72.4 ML/M2
LEFT VENTRICLE SYSTOLIC VOLUME: 135.44 ML
LEFT VENTRICULAR INTERNAL DIMENSION IN DIASTOLE: 6.02 CM (ref 3.5–6)
LEFT VENTRICULAR MASS: 230.85 G
LEUKOCYTE ESTERASE UR QL STRIP: ABNORMAL
LV LATERAL E/E' RATIO: 16 M/S
LV SEPTAL E/E' RATIO: 56 M/S
LYMPHOCYTES # BLD AUTO: 1.4 K/UL (ref 1–4.8)
LYMPHOCYTES # BLD AUTO: 1.7 K/UL (ref 1–4.8)
LYMPHOCYTES NFR BLD: 14.5 % (ref 18–48)
LYMPHOCYTES NFR BLD: 19.3 % (ref 18–48)
MAGNESIUM SERPL-MCNC: 2.5 MG/DL (ref 1.6–2.6)
MCH RBC QN AUTO: 28.5 PG (ref 27–31)
MCH RBC QN AUTO: 29.2 PG (ref 27–31)
MCHC RBC AUTO-ENTMCNC: 30.5 G/DL (ref 32–36)
MCHC RBC AUTO-ENTMCNC: 31.5 G/DL (ref 32–36)
MCV RBC AUTO: 93 FL (ref 82–98)
MCV RBC AUTO: 94 FL (ref 82–98)
MICROSCOPIC COMMENT: ABNORMAL
MONOCYTES # BLD AUTO: 1.3 K/UL (ref 0.3–1)
MONOCYTES # BLD AUTO: 1.4 K/UL (ref 0.3–1)
MONOCYTES NFR BLD: 13.9 % (ref 4–15)
MONOCYTES NFR BLD: 14.5 % (ref 4–15)
MV MEAN GRADIENT: 1 MMHG
MV PEAK E VEL: 1.12 M/S
MV PEAK GRADIENT: 5 MMHG
MV STENOSIS PRESSURE HALF TIME: 65.05 MS
MV VALVE AREA P 1/2 METHOD: 3.38 CM2
NEUTROPHILS # BLD AUTO: 5.9 K/UL (ref 1.8–7.7)
NEUTROPHILS # BLD AUTO: 6.7 K/UL (ref 1.8–7.7)
NEUTROPHILS NFR BLD: 65.5 % (ref 38–73)
NEUTROPHILS NFR BLD: 70.2 % (ref 38–73)
NITRITE UR QL STRIP: NEGATIVE
NRBC BLD-RTO: 0 /100 WBC
NRBC BLD-RTO: 0 /100 WBC
PH UR STRIP: 6 [PH] (ref 5–8)
PISA MRMAX VEL: 0.05 M/S
PISA RADIUS: 0.64 CM
PISA TR MAX VEL: 3.24 M/S
PISA TR VN NYQUIST: 0 M/S
PLATELET # BLD AUTO: 254 K/UL (ref 150–450)
PLATELET # BLD AUTO: 287 K/UL (ref 150–450)
PMV BLD AUTO: 10.3 FL (ref 9.2–12.9)
PMV BLD AUTO: 9.4 FL (ref 9.2–12.9)
POCT GLUCOSE: 272 MG/DL (ref 70–110)
POCT GLUCOSE: 278 MG/DL (ref 70–110)
POCT GLUCOSE: 288 MG/DL (ref 70–110)
POCT GLUCOSE: 300 MG/DL (ref 70–110)
POTASSIUM SERPL-SCNC: 4.7 MMOL/L (ref 3.5–5.1)
POTASSIUM SERPL-SCNC: 5.2 MMOL/L (ref 3.5–5.1)
PROT SERPL-MCNC: 6.4 G/DL (ref 6–8.4)
PROT SERPL-MCNC: 6.8 G/DL (ref 6–8.4)
PROT UR QL STRIP: NEGATIVE
PROT UR-MCNC: 14 MG/DL (ref 0–15)
PROTHROMBIN TIME: 18.2 SEC (ref 9–12.5)
PV PEAK VELOCITY: 0.59 CM/S
RA MAJOR: 7.36 CM
RA PRESSURE: 15 MMHG
RA WIDTH: 5.39 CM
RBC # BLD AUTO: 3.47 M/UL (ref 4–5.4)
RBC # BLD AUTO: 3.56 M/UL (ref 4–5.4)
RBC #/AREA URNS HPF: 2 /HPF (ref 0–4)
RIGHT VENTRICULAR END-DIASTOLIC DIMENSION: 4.08 CM
RV TISSUE DOPPLER FREE WALL SYSTOLIC VELOCITY 1 (APICAL 4 CHAMBER VIEW): 5.99 CM/S
SINUS: 4.07 CM
SODIUM SERPL-SCNC: 135 MMOL/L (ref 136–145)
SODIUM SERPL-SCNC: 137 MMOL/L (ref 136–145)
SODIUM UR-SCNC: 58 MMOL/L (ref 20–250)
SP GR UR STRIP: 1.01 (ref 1–1.03)
STJ: 4.2 CM
TDI LATERAL: 0.07 M/S
TDI SEPTAL: 0.02 M/S
TDI: 0.05 M/S
TR MAX PG: 42 MMHG
TROPONIN I SERPL DL<=0.01 NG/ML-MCNC: 0.05 NG/ML (ref 0–0.03)
TV REST PULMONARY ARTERY PRESSURE: 57 MMHG
URN SPEC COLLECT METH UR: ABNORMAL
UROBILINOGEN UR STRIP-ACNC: NEGATIVE EU/DL
WBC # BLD AUTO: 8.97 K/UL (ref 3.9–12.7)
WBC # BLD AUTO: 9.49 K/UL (ref 3.9–12.7)
WBC #/AREA URNS HPF: 14 /HPF (ref 0–5)

## 2021-05-08 PROCEDURE — 27000221 HC OXYGEN, UP TO 24 HOURS

## 2021-05-08 PROCEDURE — 80053 COMPREHEN METABOLIC PANEL: CPT | Mod: 91 | Performed by: NURSE PRACTITIONER

## 2021-05-08 PROCEDURE — S0171 BUMETANIDE 0.5 MG: HCPCS | Performed by: EMERGENCY MEDICINE

## 2021-05-08 PROCEDURE — 85025 COMPLETE CBC W/AUTO DIFF WBC: CPT | Mod: 91 | Performed by: NURSE PRACTITIONER

## 2021-05-08 PROCEDURE — 83735 ASSAY OF MAGNESIUM: CPT | Performed by: NURSE PRACTITIONER

## 2021-05-08 PROCEDURE — 94761 N-INVAS EAR/PLS OXIMETRY MLT: CPT

## 2021-05-08 PROCEDURE — 83880 ASSAY OF NATRIURETIC PEPTIDE: CPT | Performed by: EMERGENCY MEDICINE

## 2021-05-08 PROCEDURE — 36415 COLL VENOUS BLD VENIPUNCTURE: CPT | Performed by: EMERGENCY MEDICINE

## 2021-05-08 PROCEDURE — 85025 COMPLETE CBC W/AUTO DIFF WBC: CPT | Performed by: EMERGENCY MEDICINE

## 2021-05-08 PROCEDURE — 84484 ASSAY OF TROPONIN QUANT: CPT | Mod: 91 | Performed by: NURSE PRACTITIONER

## 2021-05-08 PROCEDURE — 63600175 PHARM REV CODE 636 W HCPCS: Performed by: EMERGENCY MEDICINE

## 2021-05-08 PROCEDURE — 82570 ASSAY OF URINE CREATININE: CPT | Performed by: NURSE PRACTITIONER

## 2021-05-08 PROCEDURE — 93010 EKG 12-LEAD: ICD-10-PCS | Mod: ,,, | Performed by: INTERNAL MEDICINE

## 2021-05-08 PROCEDURE — 63600175 PHARM REV CODE 636 W HCPCS: Performed by: NURSE PRACTITIONER

## 2021-05-08 PROCEDURE — 36415 COLL VENOUS BLD VENIPUNCTURE: CPT | Performed by: NURSE PRACTITIONER

## 2021-05-08 PROCEDURE — 85610 PROTHROMBIN TIME: CPT | Performed by: EMERGENCY MEDICINE

## 2021-05-08 PROCEDURE — 25000003 PHARM REV CODE 250: Performed by: NURSE PRACTITIONER

## 2021-05-08 PROCEDURE — 11000001 HC ACUTE MED/SURG PRIVATE ROOM

## 2021-05-08 PROCEDURE — 83036 HEMOGLOBIN GLYCOSYLATED A1C: CPT | Performed by: NURSE PRACTITIONER

## 2021-05-08 PROCEDURE — 93005 ELECTROCARDIOGRAM TRACING: CPT

## 2021-05-08 PROCEDURE — 87086 URINE CULTURE/COLONY COUNT: CPT | Performed by: NURSE PRACTITIONER

## 2021-05-08 PROCEDURE — 84300 ASSAY OF URINE SODIUM: CPT | Performed by: NURSE PRACTITIONER

## 2021-05-08 PROCEDURE — 81000 URINALYSIS NONAUTO W/SCOPE: CPT | Performed by: NURSE PRACTITIONER

## 2021-05-08 PROCEDURE — 84156 ASSAY OF PROTEIN URINE: CPT | Performed by: NURSE PRACTITIONER

## 2021-05-08 PROCEDURE — 80053 COMPREHEN METABOLIC PANEL: CPT | Performed by: EMERGENCY MEDICINE

## 2021-05-08 PROCEDURE — 63600175 PHARM REV CODE 636 W HCPCS: Performed by: GENERAL PRACTICE

## 2021-05-08 PROCEDURE — 84484 ASSAY OF TROPONIN QUANT: CPT | Performed by: EMERGENCY MEDICINE

## 2021-05-08 PROCEDURE — 25000003 PHARM REV CODE 250: Performed by: EMERGENCY MEDICINE

## 2021-05-08 PROCEDURE — 93010 ELECTROCARDIOGRAM REPORT: CPT | Mod: ,,, | Performed by: INTERNAL MEDICINE

## 2021-05-08 RX ORDER — CETIRIZINE HYDROCHLORIDE 10 MG/1
10 TABLET ORAL DAILY
Status: DISCONTINUED | OUTPATIENT
Start: 2021-05-08 | End: 2021-05-11 | Stop reason: HOSPADM

## 2021-05-08 RX ORDER — ONDANSETRON 2 MG/ML
4 INJECTION INTRAMUSCULAR; INTRAVENOUS EVERY 12 HOURS PRN
Status: DISCONTINUED | OUTPATIENT
Start: 2021-05-08 | End: 2021-05-11 | Stop reason: HOSPADM

## 2021-05-08 RX ORDER — PROPRANOLOL HYDROCHLORIDE 80 MG/1
160 CAPSULE, EXTENDED RELEASE ORAL DAILY
Status: DISCONTINUED | OUTPATIENT
Start: 2021-05-09 | End: 2021-05-08

## 2021-05-08 RX ORDER — DOBUTAMINE HYDROCHLORIDE 400 MG/100ML
5 INJECTION INTRAVENOUS CONTINUOUS
Status: DISCONTINUED | OUTPATIENT
Start: 2021-05-08 | End: 2021-05-11

## 2021-05-08 RX ORDER — INSULIN ASPART 100 [IU]/ML
0-5 INJECTION, SOLUTION INTRAVENOUS; SUBCUTANEOUS
Status: DISCONTINUED | OUTPATIENT
Start: 2021-05-08 | End: 2021-05-11 | Stop reason: HOSPADM

## 2021-05-08 RX ORDER — METOPROLOL SUCCINATE 50 MG/1
50 TABLET, EXTENDED RELEASE ORAL DAILY
Status: DISCONTINUED | OUTPATIENT
Start: 2021-05-09 | End: 2021-05-08

## 2021-05-08 RX ORDER — PROPRANOLOL HYDROCHLORIDE 80 MG/1
160 CAPSULE, EXTENDED RELEASE ORAL DAILY
Status: DISCONTINUED | OUTPATIENT
Start: 2021-05-08 | End: 2021-05-08

## 2021-05-08 RX ORDER — FUROSEMIDE 10 MG/ML
40 INJECTION INTRAMUSCULAR; INTRAVENOUS
Status: DISCONTINUED | OUTPATIENT
Start: 2021-05-08 | End: 2021-05-08

## 2021-05-08 RX ORDER — DILTIAZEM HYDROCHLORIDE 5 MG/ML
10 INJECTION INTRAVENOUS
Status: COMPLETED | OUTPATIENT
Start: 2021-05-08 | End: 2021-05-08

## 2021-05-08 RX ORDER — ASCORBIC ACID 500 MG
1000 TABLET ORAL DAILY
Status: DISCONTINUED | OUTPATIENT
Start: 2021-05-08 | End: 2021-05-11 | Stop reason: HOSPADM

## 2021-05-08 RX ORDER — GLUCAGON 1 MG
1 KIT INJECTION
Status: DISCONTINUED | OUTPATIENT
Start: 2021-05-08 | End: 2021-05-11 | Stop reason: HOSPADM

## 2021-05-08 RX ORDER — IBUPROFEN 200 MG
24 TABLET ORAL
Status: DISCONTINUED | OUTPATIENT
Start: 2021-05-08 | End: 2021-05-11 | Stop reason: HOSPADM

## 2021-05-08 RX ORDER — BUMETANIDE 0.25 MG/ML
1 INJECTION INTRAMUSCULAR; INTRAVENOUS
Status: COMPLETED | OUTPATIENT
Start: 2021-05-08 | End: 2021-05-08

## 2021-05-08 RX ORDER — SODIUM CHLORIDE 0.9 % (FLUSH) 0.9 %
10 SYRINGE (ML) INJECTION
Status: DISCONTINUED | OUTPATIENT
Start: 2021-05-08 | End: 2021-05-11 | Stop reason: HOSPADM

## 2021-05-08 RX ORDER — POLYETHYLENE GLYCOL 3350 17 G/17G
17 POWDER, FOR SOLUTION ORAL DAILY
Status: DISCONTINUED | OUTPATIENT
Start: 2021-05-08 | End: 2021-05-11 | Stop reason: HOSPADM

## 2021-05-08 RX ORDER — IBUPROFEN 200 MG
16 TABLET ORAL
Status: DISCONTINUED | OUTPATIENT
Start: 2021-05-08 | End: 2021-05-11 | Stop reason: HOSPADM

## 2021-05-08 RX ORDER — FLUTICASONE PROPIONATE 50 MCG
2 SPRAY, SUSPENSION (ML) NASAL DAILY
Status: DISCONTINUED | OUTPATIENT
Start: 2021-05-08 | End: 2021-05-11 | Stop reason: HOSPADM

## 2021-05-08 RX ADMIN — PROPRANOLOL HYDROCHLORIDE 160 MG: 80 CAPSULE, EXTENDED RELEASE ORAL at 08:05

## 2021-05-08 RX ADMIN — INSULIN ASPART 1 UNITS: 100 INJECTION, SOLUTION INTRAVENOUS; SUBCUTANEOUS at 08:05

## 2021-05-08 RX ADMIN — INSULIN ASPART 3 UNITS: 100 INJECTION, SOLUTION INTRAVENOUS; SUBCUTANEOUS at 11:05

## 2021-05-08 RX ADMIN — AMIODARONE HYDROCHLORIDE 1 MG/MIN: 1.8 INJECTION, SOLUTION INTRAVENOUS at 04:05

## 2021-05-08 RX ADMIN — FUROSEMIDE 40 MG: 10 INJECTION, SOLUTION INTRAMUSCULAR; INTRAVENOUS at 08:05

## 2021-05-08 RX ADMIN — OXYCODONE HYDROCHLORIDE AND ACETAMINOPHEN 1000 MG: 500 TABLET ORAL at 08:05

## 2021-05-08 RX ADMIN — INSULIN HUMAN 5 UNITS: 100 INJECTION, SOLUTION PARENTERAL at 02:05

## 2021-05-08 RX ADMIN — DILTIAZEM HYDROCHLORIDE 10 MG: 5 INJECTION INTRAVENOUS at 02:05

## 2021-05-08 RX ADMIN — BUMETANIDE 1 MG: 0.25 INJECTION INTRAMUSCULAR; INTRAVENOUS at 02:05

## 2021-05-08 RX ADMIN — APIXABAN 5 MG: 2.5 TABLET, FILM COATED ORAL at 08:05

## 2021-05-08 RX ADMIN — DOBUTAMINE HYDROCHLORIDE 5 MCG/KG/MIN: 400 INJECTION INTRAVENOUS at 04:05

## 2021-05-08 RX ADMIN — AMIODARONE HYDROCHLORIDE 0.5 MG/MIN: 1.8 INJECTION, SOLUTION INTRAVENOUS at 09:05

## 2021-05-08 RX ADMIN — CETIRIZINE HYDROCHLORIDE 10 MG: 10 TABLET, FILM COATED ORAL at 08:05

## 2021-05-08 RX ADMIN — INSULIN ASPART 3 UNITS: 100 INJECTION, SOLUTION INTRAVENOUS; SUBCUTANEOUS at 04:05

## 2021-05-08 RX ADMIN — AMIODARONE HYDROCHLORIDE 0.5 MG/MIN: 1.8 INJECTION, SOLUTION INTRAVENOUS at 11:05

## 2021-05-09 PROBLEM — K76.1 CHRONIC PASSIVE CONGESTION OF LIVER: Status: ACTIVE | Noted: 2021-05-09

## 2021-05-09 PROBLEM — I13.10 CARDIORENAL SYNDROME: Status: ACTIVE | Noted: 2021-05-09

## 2021-05-09 LAB
ALBUMIN SERPL BCP-MCNC: 3.2 G/DL (ref 3.5–5.2)
ALP SERPL-CCNC: 128 U/L (ref 55–135)
ALT SERPL W/O P-5'-P-CCNC: 138 U/L (ref 10–44)
ANION GAP SERPL CALC-SCNC: 14 MMOL/L (ref 8–16)
AST SERPL-CCNC: 106 U/L (ref 10–40)
BACTERIA UR CULT: NORMAL
BASOPHILS # BLD AUTO: 0.03 K/UL (ref 0–0.2)
BASOPHILS NFR BLD: 0.5 % (ref 0–1.9)
BILIRUB SERPL-MCNC: 0.8 MG/DL (ref 0.1–1)
BUN SERPL-MCNC: 57 MG/DL (ref 8–23)
CALCIUM SERPL-MCNC: 9.3 MG/DL (ref 8.7–10.5)
CHLORIDE SERPL-SCNC: 95 MMOL/L (ref 95–110)
CO2 SERPL-SCNC: 28 MMOL/L (ref 23–29)
CREAT SERPL-MCNC: 1.8 MG/DL (ref 0.5–1.4)
DIFFERENTIAL METHOD: ABNORMAL
EOSINOPHIL # BLD AUTO: 0.1 K/UL (ref 0–0.5)
EOSINOPHIL NFR BLD: 2.2 % (ref 0–8)
ERYTHROCYTE [DISTWIDTH] IN BLOOD BY AUTOMATED COUNT: 16.8 % (ref 11.5–14.5)
EST. GFR  (AFRICAN AMERICAN): 31 ML/MIN/1.73 M^2
EST. GFR  (NON AFRICAN AMERICAN): 27 ML/MIN/1.73 M^2
GLUCOSE SERPL-MCNC: 204 MG/DL (ref 70–110)
HCT VFR BLD AUTO: 32.1 % (ref 37–48.5)
HGB BLD-MCNC: 9.9 G/DL (ref 12–16)
IMM GRANULOCYTES # BLD AUTO: 0.03 K/UL (ref 0–0.04)
IMM GRANULOCYTES NFR BLD AUTO: 0.5 % (ref 0–0.5)
LYMPHOCYTES # BLD AUTO: 1.4 K/UL (ref 1–4.8)
LYMPHOCYTES NFR BLD: 22.2 % (ref 18–48)
MCH RBC QN AUTO: 28.8 PG (ref 27–31)
MCHC RBC AUTO-ENTMCNC: 30.8 G/DL (ref 32–36)
MCV RBC AUTO: 93 FL (ref 82–98)
MONOCYTES # BLD AUTO: 0.9 K/UL (ref 0.3–1)
MONOCYTES NFR BLD: 14.9 % (ref 4–15)
NEUTROPHILS # BLD AUTO: 3.7 K/UL (ref 1.8–7.7)
NEUTROPHILS NFR BLD: 59.7 % (ref 38–73)
NRBC BLD-RTO: 0 /100 WBC
PLATELET # BLD AUTO: 264 K/UL (ref 150–450)
PMV BLD AUTO: 9.8 FL (ref 9.2–12.9)
POCT GLUCOSE: 206 MG/DL (ref 70–110)
POCT GLUCOSE: 219 MG/DL (ref 70–110)
POCT GLUCOSE: 270 MG/DL (ref 70–110)
POCT GLUCOSE: 282 MG/DL (ref 70–110)
POTASSIUM SERPL-SCNC: 4.5 MMOL/L (ref 3.5–5.1)
PROT SERPL-MCNC: 6.1 G/DL (ref 6–8.4)
RBC # BLD AUTO: 3.44 M/UL (ref 4–5.4)
SODIUM SERPL-SCNC: 137 MMOL/L (ref 136–145)
WBC # BLD AUTO: 6.26 K/UL (ref 3.9–12.7)

## 2021-05-09 PROCEDURE — 93010 EKG 12-LEAD: ICD-10-PCS | Mod: ,,, | Performed by: INTERNAL MEDICINE

## 2021-05-09 PROCEDURE — 25000242 PHARM REV CODE 250 ALT 637 W/ HCPCS: Performed by: NURSE PRACTITIONER

## 2021-05-09 PROCEDURE — 80053 COMPREHEN METABOLIC PANEL: CPT | Performed by: NURSE PRACTITIONER

## 2021-05-09 PROCEDURE — 93010 ELECTROCARDIOGRAM REPORT: CPT | Mod: ,,, | Performed by: INTERNAL MEDICINE

## 2021-05-09 PROCEDURE — 11000001 HC ACUTE MED/SURG PRIVATE ROOM

## 2021-05-09 PROCEDURE — 94761 N-INVAS EAR/PLS OXIMETRY MLT: CPT

## 2021-05-09 PROCEDURE — 25000003 PHARM REV CODE 250: Performed by: NURSE PRACTITIONER

## 2021-05-09 PROCEDURE — 27000221 HC OXYGEN, UP TO 24 HOURS

## 2021-05-09 PROCEDURE — 36415 COLL VENOUS BLD VENIPUNCTURE: CPT | Performed by: NURSE PRACTITIONER

## 2021-05-09 PROCEDURE — 85025 COMPLETE CBC W/AUTO DIFF WBC: CPT | Performed by: NURSE PRACTITIONER

## 2021-05-09 PROCEDURE — 93005 ELECTROCARDIOGRAM TRACING: CPT

## 2021-05-09 PROCEDURE — 63600175 PHARM REV CODE 636 W HCPCS: Performed by: GENERAL PRACTICE

## 2021-05-09 RX ADMIN — AMIODARONE HYDROCHLORIDE 0.5 MG/MIN: 1.8 INJECTION, SOLUTION INTRAVENOUS at 11:05

## 2021-05-09 RX ADMIN — INSULIN ASPART 2 UNITS: 100 INJECTION, SOLUTION INTRAVENOUS; SUBCUTANEOUS at 07:05

## 2021-05-09 RX ADMIN — INSULIN ASPART 3 UNITS: 100 INJECTION, SOLUTION INTRAVENOUS; SUBCUTANEOUS at 11:05

## 2021-05-09 RX ADMIN — CETIRIZINE HYDROCHLORIDE 10 MG: 10 TABLET, FILM COATED ORAL at 09:05

## 2021-05-09 RX ADMIN — APIXABAN 5 MG: 2.5 TABLET, FILM COATED ORAL at 09:05

## 2021-05-09 RX ADMIN — FLUTICASONE PROPIONATE 100 MCG: 50 SPRAY, METERED NASAL at 09:05

## 2021-05-09 RX ADMIN — INSULIN ASPART 1 UNITS: 100 INJECTION, SOLUTION INTRAVENOUS; SUBCUTANEOUS at 09:05

## 2021-05-09 RX ADMIN — OXYCODONE HYDROCHLORIDE AND ACETAMINOPHEN 1000 MG: 500 TABLET ORAL at 09:05

## 2021-05-09 RX ADMIN — LEVOTHYROXINE SODIUM 75 MCG: 0.03 TABLET ORAL at 05:05

## 2021-05-09 RX ADMIN — INSULIN ASPART 3 UNITS: 100 INJECTION, SOLUTION INTRAVENOUS; SUBCUTANEOUS at 05:05

## 2021-05-10 LAB
ALBUMIN SERPL BCP-MCNC: 3.2 G/DL (ref 3.5–5.2)
ALP SERPL-CCNC: 129 U/L (ref 55–135)
ALT SERPL W/O P-5'-P-CCNC: 112 U/L (ref 10–44)
ANION GAP SERPL CALC-SCNC: 13 MMOL/L (ref 8–16)
AST SERPL-CCNC: 62 U/L (ref 10–40)
BASOPHILS # BLD AUTO: 0.02 K/UL (ref 0–0.2)
BASOPHILS NFR BLD: 0.3 % (ref 0–1.9)
BILIRUB SERPL-MCNC: 0.8 MG/DL (ref 0.1–1)
BUN SERPL-MCNC: 52 MG/DL (ref 8–23)
CALCIUM SERPL-MCNC: 9.5 MG/DL (ref 8.7–10.5)
CHLORIDE SERPL-SCNC: 95 MMOL/L (ref 95–110)
CO2 SERPL-SCNC: 31 MMOL/L (ref 23–29)
CREAT SERPL-MCNC: 1.6 MG/DL (ref 0.5–1.4)
DIFFERENTIAL METHOD: ABNORMAL
EOSINOPHIL # BLD AUTO: 0.2 K/UL (ref 0–0.5)
EOSINOPHIL NFR BLD: 2.9 % (ref 0–8)
ERYTHROCYTE [DISTWIDTH] IN BLOOD BY AUTOMATED COUNT: 16.4 % (ref 11.5–14.5)
EST. GFR  (AFRICAN AMERICAN): 36 ML/MIN/1.73 M^2
EST. GFR  (NON AFRICAN AMERICAN): 31 ML/MIN/1.73 M^2
GLUCOSE SERPL-MCNC: 180 MG/DL (ref 70–110)
HCT VFR BLD AUTO: 33.4 % (ref 37–48.5)
HGB BLD-MCNC: 10 G/DL (ref 12–16)
IMM GRANULOCYTES # BLD AUTO: 0.03 K/UL (ref 0–0.04)
IMM GRANULOCYTES NFR BLD AUTO: 0.4 % (ref 0–0.5)
LYMPHOCYTES # BLD AUTO: 1.5 K/UL (ref 1–4.8)
LYMPHOCYTES NFR BLD: 19.9 % (ref 18–48)
MCH RBC QN AUTO: 28.2 PG (ref 27–31)
MCHC RBC AUTO-ENTMCNC: 29.9 G/DL (ref 32–36)
MCV RBC AUTO: 94 FL (ref 82–98)
MONOCYTES # BLD AUTO: 1 K/UL (ref 0.3–1)
MONOCYTES NFR BLD: 12.7 % (ref 4–15)
NEUTROPHILS # BLD AUTO: 4.8 K/UL (ref 1.8–7.7)
NEUTROPHILS NFR BLD: 63.8 % (ref 38–73)
NRBC BLD-RTO: 0 /100 WBC
PLATELET # BLD AUTO: 309 K/UL (ref 150–450)
PMV BLD AUTO: 9.5 FL (ref 9.2–12.9)
POCT GLUCOSE: 249 MG/DL (ref 70–110)
POCT GLUCOSE: 256 MG/DL (ref 70–110)
POCT GLUCOSE: 274 MG/DL (ref 70–110)
POTASSIUM SERPL-SCNC: 4.8 MMOL/L (ref 3.5–5.1)
PROT SERPL-MCNC: 6.3 G/DL (ref 6–8.4)
RBC # BLD AUTO: 3.54 M/UL (ref 4–5.4)
SODIUM SERPL-SCNC: 139 MMOL/L (ref 136–145)
WBC # BLD AUTO: 7.47 K/UL (ref 3.9–12.7)

## 2021-05-10 PROCEDURE — 94761 N-INVAS EAR/PLS OXIMETRY MLT: CPT

## 2021-05-10 PROCEDURE — 27000221 HC OXYGEN, UP TO 24 HOURS

## 2021-05-10 PROCEDURE — 25000003 PHARM REV CODE 250: Performed by: NURSE PRACTITIONER

## 2021-05-10 PROCEDURE — 99232 PR SUBSEQUENT HOSPITAL CARE,LEVL II: ICD-10-PCS | Mod: ,,, | Performed by: INTERNAL MEDICINE

## 2021-05-10 PROCEDURE — 36415 COLL VENOUS BLD VENIPUNCTURE: CPT | Performed by: NURSE PRACTITIONER

## 2021-05-10 PROCEDURE — 11000001 HC ACUTE MED/SURG PRIVATE ROOM

## 2021-05-10 PROCEDURE — C9399 UNCLASSIFIED DRUGS OR BIOLOG: HCPCS | Performed by: FAMILY MEDICINE

## 2021-05-10 PROCEDURE — 85025 COMPLETE CBC W/AUTO DIFF WBC: CPT | Performed by: NURSE PRACTITIONER

## 2021-05-10 PROCEDURE — 25000003 PHARM REV CODE 250: Performed by: FAMILY MEDICINE

## 2021-05-10 PROCEDURE — 99232 SBSQ HOSP IP/OBS MODERATE 35: CPT | Mod: ,,, | Performed by: INTERNAL MEDICINE

## 2021-05-10 PROCEDURE — 80053 COMPREHEN METABOLIC PANEL: CPT | Performed by: NURSE PRACTITIONER

## 2021-05-10 PROCEDURE — 63600175 PHARM REV CODE 636 W HCPCS: Performed by: GENERAL PRACTICE

## 2021-05-10 RX ORDER — ACETAMINOPHEN 325 MG/1
650 TABLET ORAL EVERY 6 HOURS PRN
Status: DISCONTINUED | OUTPATIENT
Start: 2021-05-10 | End: 2021-05-11 | Stop reason: HOSPADM

## 2021-05-10 RX ORDER — BENZONATATE 100 MG/1
200 CAPSULE ORAL 3 TIMES DAILY PRN
Status: DISCONTINUED | OUTPATIENT
Start: 2021-05-10 | End: 2021-05-11 | Stop reason: HOSPADM

## 2021-05-10 RX ADMIN — LEVOTHYROXINE SODIUM 75 MCG: 0.03 TABLET ORAL at 05:05

## 2021-05-10 RX ADMIN — ACETAMINOPHEN 650 MG: 325 TABLET ORAL at 11:05

## 2021-05-10 RX ADMIN — POLYETHYLENE GLYCOL 3350 17 G: 17 POWDER, FOR SOLUTION ORAL at 09:05

## 2021-05-10 RX ADMIN — APIXABAN 5 MG: 2.5 TABLET, FILM COATED ORAL at 09:05

## 2021-05-10 RX ADMIN — INSULIN ASPART 2 UNITS: 100 INJECTION, SOLUTION INTRAVENOUS; SUBCUTANEOUS at 11:05

## 2021-05-10 RX ADMIN — AMIODARONE HYDROCHLORIDE 0.5 MG/MIN: 1.8 INJECTION, SOLUTION INTRAVENOUS at 12:05

## 2021-05-10 RX ADMIN — CETIRIZINE HYDROCHLORIDE 10 MG: 10 TABLET, FILM COATED ORAL at 09:05

## 2021-05-10 RX ADMIN — INSULIN DETEMIR 8 UNITS: 100 INJECTION, SOLUTION SUBCUTANEOUS at 08:05

## 2021-05-10 RX ADMIN — APIXABAN 5 MG: 2.5 TABLET, FILM COATED ORAL at 08:05

## 2021-05-10 RX ADMIN — DOBUTAMINE HYDROCHLORIDE 5 MCG/KG/MIN: 400 INJECTION INTRAVENOUS at 05:05

## 2021-05-10 RX ADMIN — INSULIN ASPART 1 UNITS: 100 INJECTION, SOLUTION INTRAVENOUS; SUBCUTANEOUS at 08:05

## 2021-05-10 RX ADMIN — BENZONATATE 200 MG: 100 CAPSULE ORAL at 10:05

## 2021-05-10 RX ADMIN — FLUTICASONE PROPIONATE 100 MCG: 50 SPRAY, METERED NASAL at 09:05

## 2021-05-10 RX ADMIN — INSULIN ASPART 3 UNITS: 100 INJECTION, SOLUTION INTRAVENOUS; SUBCUTANEOUS at 05:05

## 2021-05-10 RX ADMIN — OXYCODONE HYDROCHLORIDE AND ACETAMINOPHEN 1000 MG: 500 TABLET ORAL at 09:05

## 2021-05-11 ENCOUNTER — DOCUMENT SCAN (OUTPATIENT)
Dept: HOME HEALTH SERVICES | Facility: HOSPITAL | Age: 76
End: 2021-05-11
Payer: MEDICARE

## 2021-05-11 VITALS
HEIGHT: 64 IN | WEIGHT: 179.44 LBS | RESPIRATION RATE: 18 BRPM | DIASTOLIC BLOOD PRESSURE: 53 MMHG | TEMPERATURE: 98 F | OXYGEN SATURATION: 98 % | BODY MASS INDEX: 30.63 KG/M2 | HEART RATE: 97 BPM | SYSTOLIC BLOOD PRESSURE: 109 MMHG

## 2021-05-11 LAB
ALBUMIN SERPL BCP-MCNC: 3.3 G/DL (ref 3.5–5.2)
ALP SERPL-CCNC: 112 U/L (ref 55–135)
ALT SERPL W/O P-5'-P-CCNC: 86 U/L (ref 10–44)
ANION GAP SERPL CALC-SCNC: 8 MMOL/L (ref 8–16)
AST SERPL-CCNC: 38 U/L (ref 10–40)
BASOPHILS # BLD AUTO: 0.02 K/UL (ref 0–0.2)
BASOPHILS NFR BLD: 0.3 % (ref 0–1.9)
BILIRUB SERPL-MCNC: 0.8 MG/DL (ref 0.1–1)
BILIRUB UR QL STRIP: NEGATIVE
BUN SERPL-MCNC: 39 MG/DL (ref 8–23)
CALCIUM SERPL-MCNC: 9.5 MG/DL (ref 8.7–10.5)
CHLORIDE SERPL-SCNC: 96 MMOL/L (ref 95–110)
CLARITY UR: CLEAR
CO2 SERPL-SCNC: 35 MMOL/L (ref 23–29)
COLOR UR: YELLOW
CREAT SERPL-MCNC: 1.3 MG/DL (ref 0.5–1.4)
DIFFERENTIAL METHOD: ABNORMAL
EOSINOPHIL # BLD AUTO: 0.2 K/UL (ref 0–0.5)
EOSINOPHIL NFR BLD: 2.3 % (ref 0–8)
ERYTHROCYTE [DISTWIDTH] IN BLOOD BY AUTOMATED COUNT: 16.4 % (ref 11.5–14.5)
EST. GFR  (AFRICAN AMERICAN): 46 ML/MIN/1.73 M^2
EST. GFR  (NON AFRICAN AMERICAN): 40 ML/MIN/1.73 M^2
GLUCOSE SERPL-MCNC: 145 MG/DL (ref 70–110)
GLUCOSE UR QL STRIP: NEGATIVE
HCT VFR BLD AUTO: 31.1 % (ref 37–48.5)
HGB BLD-MCNC: 9.6 G/DL (ref 12–16)
HGB UR QL STRIP: NEGATIVE
IMM GRANULOCYTES # BLD AUTO: 0.03 K/UL (ref 0–0.04)
IMM GRANULOCYTES NFR BLD AUTO: 0.4 % (ref 0–0.5)
KETONES UR QL STRIP: NEGATIVE
LEUKOCYTE ESTERASE UR QL STRIP: NEGATIVE
LYMPHOCYTES # BLD AUTO: 1.4 K/UL (ref 1–4.8)
LYMPHOCYTES NFR BLD: 19.6 % (ref 18–48)
MCH RBC QN AUTO: 28.8 PG (ref 27–31)
MCHC RBC AUTO-ENTMCNC: 30.9 G/DL (ref 32–36)
MCV RBC AUTO: 93 FL (ref 82–98)
MONOCYTES # BLD AUTO: 0.9 K/UL (ref 0.3–1)
MONOCYTES NFR BLD: 12.7 % (ref 4–15)
NEUTROPHILS # BLD AUTO: 4.7 K/UL (ref 1.8–7.7)
NEUTROPHILS NFR BLD: 64.7 % (ref 38–73)
NITRITE UR QL STRIP: NEGATIVE
NRBC BLD-RTO: 0 /100 WBC
PH UR STRIP: 5 [PH] (ref 5–8)
PLATELET # BLD AUTO: 300 K/UL (ref 150–450)
PMV BLD AUTO: 9.5 FL (ref 9.2–12.9)
POCT GLUCOSE: 146 MG/DL (ref 70–110)
POCT GLUCOSE: 194 MG/DL (ref 70–110)
POTASSIUM SERPL-SCNC: 4.8 MMOL/L (ref 3.5–5.1)
PROT SERPL-MCNC: 6.3 G/DL (ref 6–8.4)
PROT UR QL STRIP: NEGATIVE
RBC # BLD AUTO: 3.33 M/UL (ref 4–5.4)
SODIUM SERPL-SCNC: 139 MMOL/L (ref 136–145)
SP GR UR STRIP: 1.02 (ref 1–1.03)
URN SPEC COLLECT METH UR: NORMAL
UROBILINOGEN UR STRIP-ACNC: NEGATIVE EU/DL
WBC # BLD AUTO: 7.31 K/UL (ref 3.9–12.7)

## 2021-05-11 PROCEDURE — 25000003 PHARM REV CODE 250: Performed by: FAMILY MEDICINE

## 2021-05-11 PROCEDURE — 36415 COLL VENOUS BLD VENIPUNCTURE: CPT | Performed by: NURSE PRACTITIONER

## 2021-05-11 PROCEDURE — 85025 COMPLETE CBC W/AUTO DIFF WBC: CPT | Performed by: NURSE PRACTITIONER

## 2021-05-11 PROCEDURE — 97161 PT EVAL LOW COMPLEX 20 MIN: CPT

## 2021-05-11 PROCEDURE — 25000003 PHARM REV CODE 250: Performed by: NURSE PRACTITIONER

## 2021-05-11 PROCEDURE — 94761 N-INVAS EAR/PLS OXIMETRY MLT: CPT

## 2021-05-11 PROCEDURE — 80053 COMPREHEN METABOLIC PANEL: CPT | Performed by: NURSE PRACTITIONER

## 2021-05-11 PROCEDURE — 81003 URINALYSIS AUTO W/O SCOPE: CPT | Performed by: NURSE PRACTITIONER

## 2021-05-11 PROCEDURE — 63600175 PHARM REV CODE 636 W HCPCS: Performed by: GENERAL PRACTICE

## 2021-05-11 PROCEDURE — 27000221 HC OXYGEN, UP TO 24 HOURS

## 2021-05-11 PROCEDURE — 99232 SBSQ HOSP IP/OBS MODERATE 35: CPT | Mod: ,,, | Performed by: INTERNAL MEDICINE

## 2021-05-11 PROCEDURE — 97116 GAIT TRAINING THERAPY: CPT

## 2021-05-11 PROCEDURE — 99232 PR SUBSEQUENT HOSPITAL CARE,LEVL II: ICD-10-PCS | Mod: ,,, | Performed by: INTERNAL MEDICINE

## 2021-05-11 RX ORDER — AMIODARONE HYDROCHLORIDE 200 MG/1
TABLET ORAL
Qty: 30 TABLET | Refills: 0 | Status: SHIPPED | OUTPATIENT
Start: 2021-05-11 | End: 2021-05-24 | Stop reason: SDUPTHER

## 2021-05-11 RX ORDER — AMIODARONE HYDROCHLORIDE 200 MG/1
400 TABLET ORAL 2 TIMES DAILY
Status: DISCONTINUED | OUTPATIENT
Start: 2021-05-11 | End: 2021-05-11 | Stop reason: HOSPADM

## 2021-05-11 RX ORDER — SPIRONOLACTONE 25 MG/1
25 TABLET ORAL DAILY
Status: DISCONTINUED | OUTPATIENT
Start: 2021-05-11 | End: 2021-05-11 | Stop reason: HOSPADM

## 2021-05-11 RX ORDER — BUMETANIDE 0.5 MG/1
0.5 TABLET ORAL NIGHTLY
Status: DISCONTINUED | OUTPATIENT
Start: 2021-05-11 | End: 2021-05-11 | Stop reason: HOSPADM

## 2021-05-11 RX ORDER — PROPRANOLOL HYDROCHLORIDE 60 MG/1
60 CAPSULE, EXTENDED RELEASE ORAL DAILY
Qty: 30 CAPSULE | Refills: 0 | Status: SHIPPED | OUTPATIENT
Start: 2021-05-11

## 2021-05-11 RX ADMIN — AMIODARONE HYDROCHLORIDE 0.5 MG/MIN: 1.8 INJECTION, SOLUTION INTRAVENOUS at 12:05

## 2021-05-11 RX ADMIN — SPIRONOLACTONE 25 MG: 25 TABLET ORAL at 10:05

## 2021-05-11 RX ADMIN — SACUBITRIL AND VALSARTAN 2 TABLET: 49; 51 TABLET, FILM COATED ORAL at 10:05

## 2021-05-11 RX ADMIN — FLUTICASONE PROPIONATE 100 MCG: 50 SPRAY, METERED NASAL at 08:05

## 2021-05-11 RX ADMIN — OXYCODONE HYDROCHLORIDE AND ACETAMINOPHEN 1000 MG: 500 TABLET ORAL at 08:05

## 2021-05-11 RX ADMIN — CETIRIZINE HYDROCHLORIDE 10 MG: 10 TABLET, FILM COATED ORAL at 09:05

## 2021-05-11 RX ADMIN — AMIODARONE HYDROCHLORIDE 400 MG: 200 TABLET ORAL at 10:05

## 2021-05-11 RX ADMIN — APIXABAN 5 MG: 2.5 TABLET, FILM COATED ORAL at 09:05

## 2021-05-11 RX ADMIN — LEVOTHYROXINE SODIUM 75 MCG: 0.03 TABLET ORAL at 05:05

## 2021-05-12 ENCOUNTER — TELEPHONE (OUTPATIENT)
Dept: CARDIOLOGY | Facility: CLINIC | Age: 76
End: 2021-05-12

## 2021-05-12 ENCOUNTER — PATIENT MESSAGE (OUTPATIENT)
Dept: CARDIOLOGY | Facility: CLINIC | Age: 76
End: 2021-05-12

## 2021-05-12 ENCOUNTER — TELEPHONE (OUTPATIENT)
Dept: PRIMARY CARE CLINIC | Facility: CLINIC | Age: 76
End: 2021-05-12

## 2021-05-12 ENCOUNTER — DOCUMENT SCAN (OUTPATIENT)
Dept: HOME HEALTH SERVICES | Facility: HOSPITAL | Age: 76
End: 2021-05-12
Payer: MEDICARE

## 2021-05-12 ENCOUNTER — PATIENT OUTREACH (OUTPATIENT)
Dept: ADMINISTRATIVE | Facility: CLINIC | Age: 76
End: 2021-05-12

## 2021-05-12 DIAGNOSIS — I15.2 HYPERTENSION ASSOCIATED WITH DIABETES: ICD-10-CM

## 2021-05-12 DIAGNOSIS — E11.59 HYPERTENSION ASSOCIATED WITH DIABETES: ICD-10-CM

## 2021-05-13 ENCOUNTER — TELEPHONE (OUTPATIENT)
Dept: PRIMARY CARE CLINIC | Facility: CLINIC | Age: 76
End: 2021-05-13

## 2021-05-13 ENCOUNTER — PATIENT MESSAGE (OUTPATIENT)
Dept: CARDIOLOGY | Facility: CLINIC | Age: 76
End: 2021-05-13

## 2021-05-14 ENCOUNTER — EXTERNAL VISIT (OUTPATIENT)
Dept: PRIMARY CARE CLINIC | Facility: CLINIC | Age: 76
End: 2021-05-14
Payer: MEDICARE

## 2021-05-14 DIAGNOSIS — D50.0 IRON DEFICIENCY ANEMIA DUE TO CHRONIC BLOOD LOSS: ICD-10-CM

## 2021-05-14 DIAGNOSIS — Z09 HOSPITAL DISCHARGE FOLLOW-UP: ICD-10-CM

## 2021-05-14 DIAGNOSIS — E11.8 TYPE 2 DIABETES MELLITUS WITH COMPLICATION, WITHOUT LONG-TERM CURRENT USE OF INSULIN: ICD-10-CM

## 2021-05-14 DIAGNOSIS — N17.9 ACUTE RENAL FAILURE SUPERIMPOSED ON STAGE 3B CHRONIC KIDNEY DISEASE, UNSPECIFIED ACUTE RENAL FAILURE TYPE: ICD-10-CM

## 2021-05-14 DIAGNOSIS — N18.32 ACUTE RENAL FAILURE SUPERIMPOSED ON STAGE 3B CHRONIC KIDNEY DISEASE, UNSPECIFIED ACUTE RENAL FAILURE TYPE: ICD-10-CM

## 2021-05-14 DIAGNOSIS — M53.3 SACRAL PAIN: ICD-10-CM

## 2021-05-14 DIAGNOSIS — I50.43 ACUTE ON CHRONIC COMBINED SYSTOLIC AND DIASTOLIC CONGESTIVE HEART FAILURE: Primary | ICD-10-CM

## 2021-05-14 DIAGNOSIS — N18.32 STAGE 3B CHRONIC KIDNEY DISEASE: ICD-10-CM

## 2021-05-14 DIAGNOSIS — I50.23 ACUTE ON CHRONIC SYSTOLIC CONGESTIVE HEART FAILURE: ICD-10-CM

## 2021-05-14 DIAGNOSIS — K76.1 CHRONIC PASSIVE CONGESTION OF LIVER: ICD-10-CM

## 2021-05-14 PROCEDURE — 99350 PR HOME VISIT,ESTAB PATIENT,LEVEL IV: ICD-10-PCS | Mod: S$GLB,,, | Performed by: INTERNAL MEDICINE

## 2021-05-14 PROCEDURE — 99350 HOME/RES VST EST HIGH MDM 60: CPT | Mod: S$GLB,,, | Performed by: INTERNAL MEDICINE

## 2021-05-14 RX ORDER — BUMETANIDE 0.5 MG/1
1 TABLET ORAL DAILY
Qty: 90 TABLET | Refills: 0
Start: 2021-05-14

## 2021-05-14 RX ORDER — METOLAZONE 2.5 MG/1
TABLET ORAL
Qty: 30 TABLET | Refills: 0 | Status: SHIPPED | OUTPATIENT
Start: 2021-05-14

## 2021-05-17 ENCOUNTER — TELEPHONE (OUTPATIENT)
Dept: PRIMARY CARE CLINIC | Facility: CLINIC | Age: 76
End: 2021-05-17

## 2021-05-17 ENCOUNTER — TELEPHONE (OUTPATIENT)
Dept: MEDSURG UNIT | Facility: HOSPITAL | Age: 76
End: 2021-05-17

## 2021-05-17 VITALS
HEART RATE: 85 BPM | OXYGEN SATURATION: 97 % | TEMPERATURE: 98 F | DIASTOLIC BLOOD PRESSURE: 55 MMHG | SYSTOLIC BLOOD PRESSURE: 110 MMHG | RESPIRATION RATE: 14 BRPM

## 2021-05-18 ENCOUNTER — TELEPHONE (OUTPATIENT)
Dept: PRIMARY CARE CLINIC | Facility: CLINIC | Age: 76
End: 2021-05-18

## 2021-05-19 DIAGNOSIS — E11.8 TYPE 2 DIABETES MELLITUS WITH COMPLICATION, WITHOUT LONG-TERM CURRENT USE OF INSULIN: ICD-10-CM

## 2021-05-20 ENCOUNTER — PATIENT MESSAGE (OUTPATIENT)
Dept: CARDIOLOGY | Facility: CLINIC | Age: 76
End: 2021-05-20

## 2021-05-20 ENCOUNTER — TELEPHONE (OUTPATIENT)
Dept: PRIMARY CARE CLINIC | Facility: CLINIC | Age: 76
End: 2021-05-20

## 2021-05-21 ENCOUNTER — EXTERNAL HOME HEALTH (OUTPATIENT)
Dept: HOME HEALTH SERVICES | Facility: HOSPITAL | Age: 76
End: 2021-05-21
Payer: MEDICARE

## 2021-05-21 ENCOUNTER — DOCUMENT SCAN (OUTPATIENT)
Dept: HOME HEALTH SERVICES | Facility: HOSPITAL | Age: 76
End: 2021-05-21
Payer: MEDICARE

## 2021-05-21 ENCOUNTER — PATIENT MESSAGE (OUTPATIENT)
Dept: CARDIOLOGY | Facility: CLINIC | Age: 76
End: 2021-05-21

## 2021-05-24 ENCOUNTER — TELEPHONE (OUTPATIENT)
Dept: PRIMARY CARE CLINIC | Facility: CLINIC | Age: 76
End: 2021-05-24

## 2021-05-24 RX ORDER — AMIODARONE HYDROCHLORIDE 200 MG/1
TABLET ORAL
Qty: 30 TABLET | Refills: 2 | Status: SHIPPED | OUTPATIENT
Start: 2021-05-24

## 2021-06-25 ENCOUNTER — DOCUMENT SCAN (OUTPATIENT)
Dept: HOME HEALTH SERVICES | Facility: HOSPITAL | Age: 76
End: 2021-06-25
Payer: MEDICARE

## 2021-07-06 ENCOUNTER — PES CALL (OUTPATIENT)
Dept: ADMINISTRATIVE | Facility: CLINIC | Age: 76
End: 2021-07-06

## 2021-07-12 PROBLEM — Z09 HOSPITAL DISCHARGE FOLLOW-UP: Status: RESOLVED | Noted: 2021-04-08 | Resolved: 2021-07-12

## 2022-12-08 ENCOUNTER — PES CALL (OUTPATIENT)
Dept: ADMINISTRATIVE | Facility: CLINIC | Age: 77
End: 2022-12-08
Payer: MEDICARE

## 2024-05-27 NOTE — TELEPHONE ENCOUNTER
----- Message from Kavitha Wilson sent at 3/15/2018  9:48 AM CDT -----  Refill on Rx Bumex.  Please send into [facility].  Please call when called in at [phone].  
----- Message from Marylou Bennett LPN sent at 3/15/2018  2:08 PM CDT -----  Contact: Pt      ----- Message -----  From: Niko Pérez  Sent: 3/15/2018   1:12 PM  To: Nicholas Mcknight Staff    Pt is calling to get a refill for bumetanide (BUMEX) 1 MG tablet. Pt states she's left previous message. Pt can be reached at [phone] (home) .    [facility] [address] [address] [address] [address]  [place] [address]  Phone: [phone] Fax: [phone]        
----- Message from Tena Reyes sent at 3/14/2018  3:36 PM CDT -----  Contact: self  Patient requesting call back regarding her prescription Bumex. Supposed to be twice daily, not once daily.  Please call back [phone]      [facility] - [place], [place] - [address] [address] [facility] [address] [facility] [address] [address] [address] [facility] [facility] [address]  [place] [address]  Phone: [phone] Fax: [phone]      
Called and spoke with Ms. Corona's daughter. Advised her that a new Rx for bumex was sent in to [facility] in [place]. She verbalized understanding. No further issues noted.   
Called to inform Ms. Corona that her Rx for bumex was sent over. She verbalized understanding. No further issues noted,.   
Rx refill sent to Dr. London.   
Patient

## (undated) DEVICE — PAD RADIOLUCENT STAT ADULT